# Patient Record
Sex: FEMALE | Race: WHITE | NOT HISPANIC OR LATINO | Employment: OTHER | URBAN - METROPOLITAN AREA
[De-identification: names, ages, dates, MRNs, and addresses within clinical notes are randomized per-mention and may not be internally consistent; named-entity substitution may affect disease eponyms.]

---

## 2017-01-03 RX ORDER — LEVOTHYROXINE SODIUM 0.1 MG/1
100 TABLET ORAL
Qty: 90 TAB | Refills: 3 | Status: SHIPPED | OUTPATIENT
Start: 2017-01-03 | End: 2017-01-10 | Stop reason: SDUPTHER

## 2017-01-10 ENCOUNTER — OFFICE VISIT (OUTPATIENT)
Dept: ENDOCRINOLOGY | Facility: MEDICAL CENTER | Age: 62
End: 2017-01-10
Payer: COMMERCIAL

## 2017-01-10 VITALS
WEIGHT: 127 LBS | BODY MASS INDEX: 22.5 KG/M2 | HEART RATE: 67 BPM | DIASTOLIC BLOOD PRESSURE: 72 MMHG | OXYGEN SATURATION: 98 % | HEIGHT: 63 IN | SYSTOLIC BLOOD PRESSURE: 118 MMHG

## 2017-01-10 DIAGNOSIS — E06.5 THYROIDITIS, CHRONIC: ICD-10-CM

## 2017-01-10 DIAGNOSIS — M85.80 OSTEOPENIA: ICD-10-CM

## 2017-01-10 DIAGNOSIS — E03.9 HYPOTHYROIDISM, UNSPECIFIED TYPE: ICD-10-CM

## 2017-01-10 PROCEDURE — 99214 OFFICE O/P EST MOD 30 MIN: CPT | Performed by: INTERNAL MEDICINE

## 2017-01-10 RX ORDER — LEVOTHYROXINE SODIUM 0.1 MG/1
100 TABLET ORAL
Qty: 90 TAB | Refills: 3 | Status: SHIPPED | OUTPATIENT
Start: 2017-01-10 | End: 2017-12-27 | Stop reason: SDUPTHER

## 2017-01-10 NOTE — MR AVS SNAPSHOT
"Lidia Fitzpatrick   1/10/2017 4:00 PM   Office Visit   MRN: [id number]    Department:  [facility]   Dept Phone:  [phone]    Description:  Female : 1955   Provider:  Daryl Lopez M.D.           Allergies as of 1/10/2017     Allergen Noted Reactions    Morphine 2016   Unspecified    Hallucinations  RXN=24 years ago      Vital Signs     Blood Pressure Pulse Height Weight Body Mass Index Oxygen Saturation    118/72 mmHg 67 1.6 m (5' 3\") 57.607 kg (127 lb) 22.50 kg/m2 98%    Smoking Status                   Never Smoker            Basic Information     Date Of Birth Sex Race Ethnicity Preferred Language    1955 Female White Non- English      Your appointments     2017  3:00 PM   [facility]K [facility]   [facility] [place] [facility]    [address]  [place] [address]   [phone]            2017  2:00 PM   FOLLOW UP with Andrés Perea MD,[facility] ([facility]    [address]  [place] [address] [phone]            Mar 03, 2017  1:30 PM   MA SCRN10 with SOCORRO VALDOVINOS MG 1   [facility] ([place])    [address]  [place] [id number] [phone]           No deodorant, powder, perfume or lotion under the arm or breast area.            Young 10, 2018  4:00 PM   Established Patient with Daryl Lopez M.D.   [facility] ([facility])    [address]  [place] [address] [phone]           You will be receiving a confirmation call a few days before your appointment from our automated call confirmation system.              Problem List              ICD-10-CM Priority Class Noted - Resolved    Hypothyroidism E03.9   2010 - Present    Osteopenia M85.80   Unknown - Present    Keratoconjunctivitis sicca (HCC) M35.01   Unknown - Present    Depression F32.9   " 5/20/2011 - Present    Visit for gynecologic examination Z01.419   1/27/2012 - Present    multiple thyroid nodules E07.9   Unknown - Present      Health Maintenance        Date Due Completion Dates    PAP SMEAR 6/30/1977 ---    COLONOSCOPY 6/30/2006 ---    IMM DTaP/Tdap/Td Vaccine (1 - Tdap) 8/13/2011 8/12/2011    IMM ZOSTER VACCINE 6/30/2016 ---    MAMMOGRAM 2/5/2017 2/5/2016, 1/30/2015, 1/24/2014, 1/15/2013, 1/13/2012            Current Immunizations     Influenza Vaccine Quad Inj (Pf) 10/13/2016  8:24 AM, 9/30/2015  8:02 AM, 9/24/2014    TD Vaccine 8/12/2011      Below and/or attached are the medications your provider expects you to take. Review all of your home medications and newly ordered medications with your provider and/or pharmacist. Follow medication instructions as directed by your provider and/or pharmacist. Please keep your medication list with you and share with your provider. Update the information when medications are discontinued, doses are changed, or new medications (including over-the-counter products) are added; and carry medication information at all times in the event of emergency situations     Allergies:  MORPHINE - Unspecified               Medications  Valid as of: January 10, 2017 -  4:38 PM    Generic Name Brand Name Tablet Size Instructions for use    B Complex Vitamins (Tab) b complex vitamins  Take 1 Tab by mouth every day.        Calcium Citrate-Vitamin D   Take 1 Tab by mouth every day.        Evening Primrose Oil (Cap) Evening Primrose Oil 1000 MG Take 1 Cap by mouth every day.        Flaxseed (Linseed) (Cap) Flax Seed Oil 1000 MG Take 1 Cap by mouth every day.        Ginkgo Biloba (Tab) Ginkgo Biloba 40 MG Take 1 Tab by mouth every day.        [place] (Cap) [place] Berry 550 MG Take 1 Cap by mouth every day.        Levothyroxine Sodium (Tab) SYNTHROID 100 MCG Take 1 Tab by mouth Every morning on an empty stomach.        Multiple Vitamin (Tab) THERAGRAN  Take 1 Tab by mouth  every day.        .                 Medicines prescribed today were sent to:     [facility] - [place], [place] - [address]    [address] [place] [address]    Phone: [phone] Fax: [phone]    Open 24 Hours?: No    [facility] - [place], [place] - [address]    [address] [place] [address]    Phone: [phone] Fax: [phone]    Open 24 Hours?: No      Medication refill instructions:       If your prescription bottle indicates you have medication refills left, it is not necessary to call your provider’s office. Please contact your pharmacy and they will refill your medication.    If your prescription bottle indicates you do not have any refills left, you may request refills at any time through one of the following ways: The online [employer] system (except Urgent Care), by calling your provider’s office, or by asking your pharmacy to contact your provider’s office with a refill request. Medication refills are processed only during regular business hours and may not be available until the next business day. Your provider may request additional information or to have a follow-up visit with you prior to refilling your medication.   *Please Note: Medication refills are assigned a new Rx number when refilled electronically. Your pharmacy may indicate that no refills were authorized even though a new prescription for the same medication is available at the pharmacy. Please request the medicine by name with the pharmacy before contacting your provider for a refill.           [employer] Access Code: Activation code not generated  Current [employer] Status: Active

## 2017-01-11 NOTE — PROGRESS NOTES
Chief Complaint   Patient presents with   • Hypothyroidism     chronic thyroiditis        HPI:         1. Hypothyroidism.    This diagnosis was first made by Dr. Fraire about eight years ago when he was with the medical school practice.  She has an ultrasound that appears to be a thyroiditis gland so I am assuming that is the basis for the hypothyroidism.  She has never had a goiter or any symptoms in the area of the thyroid.  She has been on Synthroid 100 mcg for a few years and it seems to suit her well.  Her TSH has bobbed up and down a little bit even though she is very regular about taking her thyroid.  For example a year ago in March, her TSH was 3.3 but in July the TSH was 0.7 at the same time the thyroxin level was mid range at 9.1.  Currently the TSH is back to 3.6 and her free T4 is still mid range at 1.0.  I am a little suspicious about the lability of the TSH so I am not inclined to change her dose.  Just given her size and her free T4 I don’t think it would be helpful to increase the dose.  Also we don’t want to over treat because it might accelerate bone loss.    2. Chronic thyroiditis.    At one point, she was followed for sub centimeter nodules.  Perhaps that was really related to the thyroiditis picture that we are seeing now because we no longer see nodules.  She has had no biopsy of any nodules.  There was never one that was large enough.  Her gland is asymptomatic.  It is not a large gland.  It is long and narrow which gives her the dimensions as indicated in the ultrasound report but on palpation is not a bulky gland and it is not tender.  Nothing more to do about it.  It is asymptomatic but she would like the gland followed because of the history of nodules.    3. Osteopenia.    T-scores lumbar spine -2.4 and at the hip -1.1.  She has a favorable FRAX calculation of 1.7%.  She is not taking medication and it is not indicated at this point.  She has very healthy lifestyle in terms of exercise,  calcium supplements and vitamin D.      She is about ten years menopausal.   She has never been symptomatic in terms of menopause symptoms.  She has not taken estrogen supplements.  I am not recommending them now at this stage and she is in agreement with that.      ROS:  Constitutional   feels generally healthy  Eyes   vision stable  ENT    no pain or epistaxis, no unusual congestion  Cardiac   no angina, no arrhythmia  Respiratory   no hemoptysis, no unusual dyspnea, no cough  GI    no pain, indigestion, or unexpected bowel change     no voiding symptoms  Musculoskeletal    no unusual or new pains  Skin   no suspicious or concerning lesions  Neuro  no unusual weakness or loss of function  Psych   appears alert and appropriate  Lymph   no new or unusual lumps or nodules    She does not have a primary care physician that she sees with any regularity. However I think she has her bases is covered.        She's had a recent hysterectomy oophorectomy for uterine prolapse. No pathology involved there.        She had a recent cardiovascular evaluation by Dr. Perea and the results are favorable.         Chemistries screened last year are favorable         Colonoscopy is planned for this coming November          She will continue to see Dr. Estrada because she has her cervix remaining and she will do breast follow-up with Dr. Harris as well.    Allergies:   Allergies   Allergen Reactions   • Morphine Unspecified     Hallucinations  RXN=24 years ago       Current medicines including changes today:  Current Outpatient Prescriptions   Medication Sig Dispense Refill   • levothyroxine (SYNTHROID) 100 MCG Tab Take 1 Tab by mouth Every morning on an empty stomach. 90 Tab 3   • multivitamin (THERAGRAN) Tab Take 1 Tab by mouth every day.     • Calcium Citrate-Vitamin D (CALCIUM + D PO) Take 1 Tab by mouth every day.     • Flaxseed, Linseed, (FLAX SEED OIL) 1000 MG Cap Take 1 Cap by mouth every day.     • [place] Vo 550 MG Cap  "Take 1 Cap by mouth every day.     • Ginkgo Biloba 40 MG Tab Take 1 Tab by mouth every day.     • Evening Primrose Oil 1000 MG Cap Take 1 Cap by mouth every day.     • b complex vitamins tablet Take 1 Tab by mouth every day.       No current facility-administered medications for this visit.        Past Medical History   Diagnosis Date   • multiple thyroid nodules      subcentimeter / 2016 heterogeneous okay   • Anxiety    • Osteopenia 2005   • Urinary tract infection, site not specified    • Keratoconjunctivitis sicca    • Hypothyroidism    • Gynecological disorder    • CATARACT      megan IOL   • Depression      mood swings   • Chronic thyroiditis       + US / neg ab       PHYSICAL EXAM:    /72 mmHg  Pulse 67  Ht 1.6 m (5' 3\")  Wt 57.607 kg (127 lb)  BMI 22.50 kg/m2  SpO2 98%     Gen.   appears healthy     Skin   appropriate for sex and age    HEENT  unremarkable    Neck   thyroid gland is palpable and about normal size. Nontender. No palpable nodules in the gland or elsewhere in the neck or supraclavicular areas.    Heart  regular    Extremities  no edema    Neuro  gait and station normal    Psych  Appropriate, calm, pleasant    ASSESSMENT AND RECOMMENDATIONS    1. Hypothyroidism, unspecified type               Clinically euthyroid on Synthroid 100 µg daily    2. Thyroiditis, chronic              Asymptomatic             Characteristic ultrasound with negative Tg ab and TPO ab     3.  Osteopenia             Favorable FRAX calculation            Basic preventative measures including regular exercise, calcium and vitamin D supplements      DISPOSITION:   January 2018 repeat ultrasound, thyroid blood tests and examination       Daryl Lopez M.D.    Copies to: Dr. Columba Perea    "

## 2017-01-12 ENCOUNTER — HOSPITAL ENCOUNTER (OUTPATIENT)
Dept: RADIOLOGY | Facility: MEDICAL CENTER | Age: 62
End: 2017-01-12
Attending: INTERNAL MEDICINE
Payer: COMMERCIAL

## 2017-01-12 DIAGNOSIS — E03.8 OTHER SPECIFIED HYPOTHYROIDISM: ICD-10-CM

## 2017-01-12 PROCEDURE — 93880 EXTRACRANIAL BILAT STUDY: CPT

## 2017-02-03 ENCOUNTER — OFFICE VISIT (OUTPATIENT)
Dept: CARDIOLOGY | Facility: MEDICAL CENTER | Age: 62
End: 2017-02-03
Payer: COMMERCIAL

## 2017-02-03 VITALS
SYSTOLIC BLOOD PRESSURE: 140 MMHG | OXYGEN SATURATION: 95 % | DIASTOLIC BLOOD PRESSURE: 80 MMHG | BODY MASS INDEX: 22.32 KG/M2 | HEART RATE: 59 BPM | WEIGHT: 126 LBS | HEIGHT: 63 IN

## 2017-02-03 DIAGNOSIS — E03.9 HYPOTHYROIDISM, UNSPECIFIED TYPE: ICD-10-CM

## 2017-02-03 DIAGNOSIS — I65.29 CAROTID ARTERY PLAQUE, UNSPECIFIED LATERALITY: ICD-10-CM

## 2017-02-03 PROCEDURE — 99213 OFFICE O/P EST LOW 20 MIN: CPT | Performed by: INTERNAL MEDICINE

## 2017-02-03 NOTE — MR AVS SNAPSHOT
"Lidia Hassanen   2/3/2017 2:15 PM   Office Visit   MRN: [id number]    Department:  [facility]   Dept Phone:  [phone]    Description:  Female : 1955   Provider:  Andrés Perea MD,[facility]           Reason for Visit     Follow-Up           Allergies as of 2/3/2017     Allergen Noted Reactions    Morphine 2016   Unspecified    Hallucinations  RXN=24 years ago      Vital Signs     Blood Pressure Pulse Height Weight Body Mass Index Oxygen Saturation    140/80 mmHg 59 1.6 m (5' 2.99\") 57.153 kg (126 lb) 22.33 kg/m2 95%    Smoking Status                   Never Smoker            Basic Information     Date Of Birth Sex Race Ethnicity Preferred Language    1955 Female White Non- English      Your appointments     Mar 03, 2017  1:30 PM   MA SCRN10 with S ARUNA MG 1   [facility] ([place])    [address]  [place] [address] [phone]           No deodorant, powder, perfume or lotion under the arm or breast area.            Young 10, 2018  4:00 PM   Established Patient with Daryl Lopez M.D.   [facility] ([facility])    [address]  [place] [address] [phone]           You will be receiving a confirmation call a few days before your appointment from our automated call confirmation system.              Problem List              ICD-10-CM Priority Class Noted - Resolved    Hypothyroidism E03.9   2010 - Present    Osteopenia M85.80   Unknown - Present    Keratoconjunctivitis sicca (CMS-[facility]) M35.01   Unknown - Present    Depression F32.9   2011 - Present    Visit for gynecologic examination Z01.419   2012 - Present    Thyroiditis, chronic E06.5   1/10/2017 - Present      Health Maintenance        Date Due Completion Dates    PAP SMEAR 1977 ---    COLONOSCOPY 2006 ---    IMM DTaP/Tdap/Td Vaccine (1 - Tdap) 2011    IMM ZOSTER " VACCINE 6/30/2016 ---    MAMMOGRAM 2/5/2017 2/5/2016, 1/30/2015, 1/24/2014, 1/15/2013, 1/13/2012            Current Immunizations     Influenza Vaccine Quad Inj (Pf) 10/13/2016  8:24 AM, 9/30/2015  8:02 AM, 9/24/2014    TD Vaccine 8/12/2011      Below and/or attached are the medications your provider expects you to take. Review all of your home medications and newly ordered medications with your provider and/or pharmacist. Follow medication instructions as directed by your provider and/or pharmacist. Please keep your medication list with you and share with your provider. Update the information when medications are discontinued, doses are changed, or new medications (including over-the-counter products) are added; and carry medication information at all times in the event of emergency situations     Allergies:  MORPHINE - Unspecified               Medications  Valid as of: February 03, 2017 -  2:45 PM    Generic Name Brand Name Tablet Size Instructions for use    B Complex Vitamins (Tab) b complex vitamins  Take 1 Tab by mouth every day.        Calcium Citrate-Vitamin D   Take 1 Tab by mouth every day.        Evening Primrose Oil (Cap) Evening Primrose Oil 1000 MG Take 1 Cap by mouth every day.        Flaxseed (Linseed) (Cap) Flax Seed Oil 1000 MG Take 1 Cap by mouth every day.        Ginkgo Biloba (Tab) Ginkgo Biloba 40 MG Take 1 Tab by mouth every day.        [place] (Cap) [place] Berry 550 MG Take 1 Cap by mouth every day.        Levothyroxine Sodium (Tab) SYNTHROID 100 MCG Take 1 Tab by mouth Every morning on an empty stomach.        Multiple Vitamin (Tab) THERAGRAN  Take 1 Tab by mouth every day.        .                 Medicines prescribed today were sent to:     [facility] - [place] [address]    [address] [facility] [place] [place] [address]    Phone: [phone] Fax: [phone]    Open 24 Hours?: No    [facility] - [place], [place] - [address]    [address]  [place] [address]    Phone: [phone] Fax: [phone]    Open 24 Hours?: No      Medication refill instructions:       If your prescription bottle indicates you have medication refills left, it is not necessary to call your provider’s office. Please contact your pharmacy and they will refill your medication.    If your prescription bottle indicates you do not have any refills left, you may request refills at any time through one of the following ways: The online [employer] system (except Urgent Care), by calling your provider’s office, or by asking your pharmacy to contact your provider’s office with a refill request. Medication refills are processed only during regular business hours and may not be available until the next business day. Your provider may request additional information or to have a follow-up visit with you prior to refilling your medication.   *Please Note: Medication refills are assigned a new Rx number when refilled electronically. Your pharmacy may indicate that no refills were authorized even though a new prescription for the same medication is available at the pharmacy. Please request the medicine by name with the pharmacy before contacting your provider for a refill.           [employer] Access Code: Activation code not generated  Current [employer] Status: Active

## 2017-02-03 NOTE — PROGRESS NOTES
Chief Complaint   Patient presents with   • Follow-Up       This patient is an established female who is here today to discuss:  recent father's cardiac procedure; She is concerned about her cardiac health; Follow up on carotid duplex, told had some ds in the past; CV risk 10 yr, 3.6%        Patient Active Problem List    Diagnosis Date Noted   • Thyroiditis, chronic 01/10/2017   • Visit for gynecologic examination 01/27/2012   • Depression 05/20/2011   • Osteopenia    • Keratoconjunctivitis sicca (CMS-HCC)    • Hypothyroidism 07/06/2010       Past Medical History   Diagnosis Date   • multiple thyroid nodules      subcentimeter / 2016 heterogeneous okay   • Anxiety    • Osteopenia 2005   • Urinary tract infection, site not specified    • Keratoconjunctivitis sicca    • Hypothyroidism    • Gynecological disorder    • CATARACT      megan IOL   • Depression      mood swings   • Chronic thyroiditis       + US / neg ab   • S/P hysterectomy with oophorectomy 2016     prolapse.     Past Surgical History   Procedure Laterality Date   • Bladder suspension     • Tubal coagulation laparoscopic bilateral     • Hysterectomy robotic  11/21/2016     Procedure: HYSTERECTOMY ROBOTIC SUPRA CERVICAL WITH BILATERAL SALPINGO-OOPHORECTOMY;  Surgeon: Columba Estrada M.D.;  Location: [facility];  Service:    • Colposacropexy robotic  11/21/2016     Procedure: COLPOSACROPEXY ROBOTIC;  Surgeon: Columba Estrada M.D.;  Location: [facility];  Service:    • Cystoscopy  11/21/2016     Procedure: CYSTOSCOPY;  Surgeon: Columba Estrada M.D.;  Location: [facility];  Service:    • Vaginal perineal exam repair  11/21/2016     Procedure: VAGINAL PERINEAL EXAM REPAIR FOR PERINEORRHAPHY, LEVATORPLASTY;  Surgeon: Columba Estrada M.D.;  Location: [facility];  Service:        Current Outpatient Prescriptions   Medication Sig Dispense Refill   • levothyroxine (SYNTHROID) 100 MCG Tab Take 1 Tab  "by mouth Every morning on an empty stomach. 90 Tab 3   • multivitamin (THERAGRAN) Tab Take 1 Tab by mouth every day.     • Calcium Citrate-Vitamin D (CALCIUM + D PO) Take 1 Tab by mouth every day.     • Flaxseed, Linseed, (FLAX SEED OIL) 1000 MG Cap Take 1 Cap by mouth every day.     • [place] Berry 550 MG Cap Take 1 Cap by mouth every day.     • Ginkgo Biloba 40 MG Tab Take 1 Tab by mouth every day.     • Evening Primrose Oil 1000 MG Cap Take 1 Cap by mouth every day.     • b complex vitamins tablet Take 1 Tab by mouth every day.       No current facility-administered medications for this visit.     Morphine      Review of Systems:     Constitutional: Denies fevers, Denies weight changes  Eyes: Denies changes in vision, no eye pain  Ears/Nose/Throat/Mouth: Denies nasal congestion or sore throat   Cardiovascular: Denies chest pain or palpitations   Respiratory: Denies shortness of breath , Denies cough  Gastrointestinal/Hepatic: Denies abdominal pain, nausea, vomiting, diarrhea, constipation or GI bleeding   Genitourinary: Denies bladder dysfunction, dysuria or frequency  Musculoskeletal/Rheum: Denies  joint pain and swelling   Skin/Breast: Denies rash, denies breast lumps or discharge  Neurological: Denies headache, confusion, memory loss or focal weakness/parasthesias  Psychiatric: denies mood disorder   Endocrine: denies hx of diabetes or thyroid dysfunction  Heme/Oncology/Lymph Nodes: Denies enlarged lymph nodes, denies brusing or known bleeding disorder  Allergic/Immunologic:  hx of allergies      All other systems were reviewed and are negative (AMA/CMS criteria)      Blood pressure 140/80, pulse 59, height 1.6 m (5' 2.99\"), weight 57.153 kg (126 lb), SpO2 95 %.  General Appearance:   Well developed, Well nourished, No acute distress, Non-toxic appearance.   HENT:  Normocephalic, Atraumatic, Oropharynx moist mucous membranes, Dentition: , Nose normal.    Eyes:  PERRLA, EOMI, Conjunctiva normal, No " discharge.  Neck:  Normal range of motion, No cervical tenderness, Supple, No stridor, no JVD .  No thyromegaly.  mild carotid bruit.  Cardiovascular:  Normal heart rate, Normal rhythm,  S1, S2, no S3,  S4; No gallops; No murmurs, No rubs, .   Extremitites with intact distal pulses, no cyanosis, clubbing or edema.  No heaves, thrills, HJR;  Peripheral pulses: carotid 2+, brachial 2+, radial 2+, ulnar 2+, femoral 2+, popliteal 2+, PT 2+, DP 2+;  Lungs:  Respiratory effort is normal. Normal breath sounds, breath sounds clear to auscultation bilaterally,  no rales, no rhonchi, no wheezing.   Abdomen: Bowel sounds normal, Soft, No tenderness, No guarding, No rebound, No masses, No hepatosplenomegaly.  Skin: Warm, Dry, No erythema, No rash, no induration or crepitus.  Neurologic: Alert & oriented x 3, Normal motor function, Normal sensory function, No focal deficits noted, cranial nerves II through XII are normal,  normal gait.  Psychiatric: Affect normal, Judgment normal, Mood normal.    Carotid duplex:   1.  There is no significant atherosclerotic plaque.    2.  There is no evidence of carotid occlusion.    3. Vertebral arteries demonstrate antegrade flow.    4. Diameter reduction in the internal carotid arteries: none. There is no hemodynamically significant stenosis.      Assessment and Plan.   61 y.o. female is reassured with no carotid stenosis, diet exercise discussed and her low 10 yr CV risk of 3.6%; No need for CCTA or calcium score at this time    1. Hypothyroidism, unspecified type  PCP    2. Carotid artery plaque, unspecified laterality  Reviewed and reassured      1. Hypothyroidism, unspecified type     2. Carotid artery plaque, unspecified laterality

## 2017-03-03 ENCOUNTER — HOSPITAL ENCOUNTER (OUTPATIENT)
Dept: RADIOLOGY | Facility: MEDICAL CENTER | Age: 62
End: 2017-03-03
Attending: OBSTETRICS & GYNECOLOGY
Payer: COMMERCIAL

## 2017-03-03 DIAGNOSIS — Z13.9 SCREENING: ICD-10-CM

## 2017-03-03 PROCEDURE — 77063 BREAST TOMOSYNTHESIS BI: CPT

## 2017-03-08 ENCOUNTER — HOSPITAL ENCOUNTER (OUTPATIENT)
Facility: MEDICAL CENTER | Age: 62
End: 2017-03-08
Payer: COMMERCIAL

## 2017-03-08 ENCOUNTER — TELEPHONE (OUTPATIENT)
Dept: MEDICAL GROUP | Age: 62
End: 2017-03-08

## 2017-03-08 LAB
ALBUMIN SERPL BCP-MCNC: 4.1 G/DL (ref 3.2–4.9)
ALBUMIN/GLOB SERPL: 1.1 G/DL
ALP SERPL-CCNC: 96 U/L (ref 30–99)
ALT SERPL-CCNC: 14 U/L (ref 2–50)
ANION GAP SERPL CALC-SCNC: 7 MMOL/L (ref 0–11.9)
AST SERPL-CCNC: 23 U/L (ref 12–45)
BDY FAT % MEASURED: 29.7 %
BILIRUB SERPL-MCNC: 0.6 MG/DL (ref 0.1–1.5)
BLOOD PRESSURE DIASTOLIC HTBPD: 84 MMHG
BLOOD PRESSURE SYSTOLIC HTBPS: 122 MMHG
BUN SERPL-MCNC: 21 MG/DL (ref 8–22)
CALCIUM SERPL-MCNC: 9.5 MG/DL (ref 8.5–10.5)
CHLORIDE SERPL-SCNC: 103 MMOL/L (ref 96–112)
CHOLEST SERPL-MCNC: 171 MG/DL (ref 100–199)
CO2 SERPL-SCNC: 28 MMOL/L (ref 20–33)
CREAT SERPL-MCNC: 0.9 MG/DL (ref 0.5–1.4)
DIABETES HTDIA: NO
EVENT NAME HTEVT: NORMAL
GLOBULIN SER CALC-MCNC: 3.6 G/DL (ref 1.9–3.5)
GLUCOSE SERPL-MCNC: 113 MG/DL (ref 65–99)
HDLC SERPL-MCNC: 54 MG/DL
HYPERTENSION HTHYP: NO
LDLC SERPL CALC-MCNC: 102 MG/DL
POTASSIUM SERPL-SCNC: 4.2 MMOL/L (ref 3.6–5.5)
PROT SERPL-MCNC: 7.7 G/DL (ref 6–8.2)
SCREENING LOC CITY HTCIT: NORMAL
SCREENING LOC STATE HTSTA: NORMAL
SCREENING LOCATION HTLOC: NORMAL
SODIUM SERPL-SCNC: 138 MMOL/L (ref 135–145)
SUBSCRIBER ID HTSID: NORMAL
TRIGL SERPL-MCNC: 76 MG/DL (ref 0–149)

## 2017-03-08 PROCEDURE — 80061 LIPID PANEL: CPT

## 2017-03-08 PROCEDURE — 80053 COMPREHEN METABOLIC PANEL: CPT

## 2017-03-08 PROCEDURE — S5190 WELLNESS ASSESSMENT BY NONPH: HCPCS

## 2017-03-09 NOTE — TELEPHONE ENCOUNTER
Called Lidia Fitzpatrick and left a message to return my call regarding his/her upcoming NP appointment with Deana Chao M.D..   If patient returns the call please transfer them to extension: [phone]

## 2017-03-17 ENCOUNTER — OFFICE VISIT (OUTPATIENT)
Dept: MEDICAL GROUP | Age: 62
End: 2017-03-17
Payer: COMMERCIAL

## 2017-03-17 VITALS
HEART RATE: 73 BPM | DIASTOLIC BLOOD PRESSURE: 70 MMHG | BODY MASS INDEX: 22.5 KG/M2 | SYSTOLIC BLOOD PRESSURE: 134 MMHG | OXYGEN SATURATION: 97 % | HEIGHT: 63 IN | TEMPERATURE: 99.7 F | WEIGHT: 127 LBS

## 2017-03-17 DIAGNOSIS — E03.9 HYPOTHYROIDISM, UNSPECIFIED TYPE: ICD-10-CM

## 2017-03-17 DIAGNOSIS — R73.03 PREDIABETES: ICD-10-CM

## 2017-03-17 DIAGNOSIS — M85.80 OSTEOPENIA: ICD-10-CM

## 2017-03-17 DIAGNOSIS — Z90.722 S/P TAH-BSO: ICD-10-CM

## 2017-03-17 DIAGNOSIS — E06.5 THYROIDITIS, CHRONIC: ICD-10-CM

## 2017-03-17 DIAGNOSIS — Z00.00 PE (PHYSICAL EXAM), ANNUAL: Primary | ICD-10-CM

## 2017-03-17 DIAGNOSIS — Z90.79 S/P TAH-BSO: ICD-10-CM

## 2017-03-17 DIAGNOSIS — Z90.710 S/P TAH-BSO: ICD-10-CM

## 2017-03-17 PROCEDURE — 99396 PREV VISIT EST AGE 40-64: CPT | Performed by: FAMILY MEDICINE

## 2017-03-17 ASSESSMENT — PATIENT HEALTH QUESTIONNAIRE - PHQ9: CLINICAL INTERPRETATION OF PHQ2 SCORE: 0

## 2017-03-17 NOTE — PROGRESS NOTES
CC: [facility], wellness visit    HPI:     Lidia Fitzpatrick is a 61 y.o. female, new patient to the clinic, presents to [facility]. Pt has the following concerns:     1. Hypothyroidism, unspecified type  Chronic, currently taking Levothyroxine, managed by Endo.   Denies chest palpitation, constipation, diarrhea, abnormal vaginal bleeding, unusual edema, hair loss.    2. Osteopenia  Hx of osteopenia per DEXA scan done in 2013. Pt's mother has hx of osteoporosis, hence she had DEXA scan done early.   She has been taking calcium, vitamin D, and does weight bearing exercise almost every day.     3. Prediabetes  Hx of prediabetes, last A1C 5.8, pt is monitoring her diet, she exercises daily, BMI 22.5     4. S/P MITCH-BSO  Pt had hx of uterine prolapse, s/p MITCH-BSO. The cervix was left behind for unclear reasons.   Pt reports copious clear  vaginal discharge. She is concerned that she might have fistula.   She has appointment to follow up with Dr. Estrada in couple months.     Current medicines (including changes today)  Current Outpatient Prescriptions   Medication Sig Dispense Refill   • levothyroxine (SYNTHROID) 100 MCG Tab Take 1 Tab by mouth Every morning on an empty stomach. 90 Tab 3   • multivitamin (THERAGRAN) Tab Take 1 Tab by mouth every day.     • Calcium Citrate-Vitamin D (CALCIUM + D PO) Take 1 Tab by mouth every day.     • Flaxseed, Linseed, (FLAX SEED OIL) 1000 MG Cap Take 1 Cap by mouth every day.     • Herrera Berry 550 MG Cap Take 1 Cap by mouth every day.     • Ginkgo Biloba 40 MG Tab Take 1 Tab by mouth every day.     • Evening Primrose Oil 1000 MG Cap Take 1 Cap by mouth every day.     • b complex vitamins tablet Take 1 Tab by mouth every day.       No current facility-administered medications for this visit.     She  has a past medical history of multiple thyroid nodules; Anxiety; Osteopenia (2005); Urinary tract infection, site not specified; Keratoconjunctivitis sicca; Hypothyroidism;  Gynecological disorder; CATARACT; Depression; Chronic thyroiditis; and S/P hysterectomy with oophorectomy (2016). She also has no past medical history of Addisons disease (CMS-HCC), Adrenal disorder, Cushings syndrome, Parathyroid disorder (CMS-HCC), Pituitary disease (CMS-HCC), Allergy, Anemia, Blood transfusion, Arrhythmia, Heart murmur, Heart attack (CMS-HCC), CHF (congestive heart failure) (CMS-HCC), Clotting disorder, ASTHMA, COPD, Diabetes, EMPHYSEMA, Arthritis, Glaucoma, Goiter, GERD (gastroesophageal reflux disease), Headache(784.0), HIV (human immunodeficiency virus infection), Hyperlipidemia, Hypertension, IBD (inflammatory bowel disease), Kidney disease, Meningitis, Migraine, Muscle disorder, Seizure (CMS-HCC), Stroke (CMS-HCC), Substance abuse, Tuberculosis, or Ulcer (CMS-HCC).  She  has past surgical history that includes bladder suspension; tubal coagulation laparoscopic bilateral; hysterectomy robotic (2016); colposacropexy robotic (2016); cystoscopy (2016); and vaginal perineal exam repair (2016).  Social History   Substance Use Topics   • Smoking status: Never Smoker    • Smokeless tobacco: Never Used   • Alcohol Use: No     Social History     Social History Narrative     Family History   Problem Relation Age of Onset   • Hyperlipidemia Mother    • Hyperlipidemia Father    • Cancer Maternal Aunt 62     Breast   • Cancer Paternal Uncle      digestive, unknown type   • Stroke Maternal Grandmother 87   • Cancer Paternal Grandfather      Stomach     Family Status   Relation Status Death Age   • Mother Alive    • Father Alive    • Maternal Grandmother     • Maternal Grandfather     • Paternal Grandmother     • Paternal Grandfather         I personally reviewed patient's problem list, allergies, medications, family hx, social hx with patient and update [facility].     REVIEW OF SYSTEMS:  CONSTITUTIONAL:  Denies night sweats, fatigue, malaise, lethargy,  "fever or chills.  RESPIRATORY:  Denies cough, wheeze, hemoptysis, or shortness of breath.  CARDIOVASCULAR:  Denies chest pains, palpitations, pedal edema  GASTROINTESTINAL:  Denies abdominal pain, nausea or vomiting, diarrhea, constipation, hematemesis, hematochezia, melena.  GENITOURINARY:  Denies urinary urgency, frequency, dysuria, or hematuria.  No obstructive symptoms.  Denies unusual discharge.      All other systems reviewed and are negative     Objective:     Blood pressure 134/70, pulse 73, temperature 37.6 °C (99.7 °F), height 1.6 m (5' 2.99\"), weight 57.607 kg (127 lb), SpO2 97 %. Body mass index is 22.5 kg/(m^2).  Physical Exam:    Constitutional: Awake, alert, in no apparent distress.  Skin: Warm, dry, good turgor, no rashes/jaundice in visible areas.  Eye: intact EOM, conjunctiva clear, lids normal.  Neck: Trachea midline, no masses, no thyromegaly. No cervical or supraclavicular lymphadenopathy.  Respiratory: Unlabored respiratory effort, lungs clear to auscultation, no wheezes, no rhonchi.  Cardiovascular: Normal S1, S2, no murmur, no rubs, no gallops, no pedal edema.  Abdomen: Soft, active bowel sounds, non-tender to palpation, no masses, no hepatosplenomegaly.  Neuro: CN 2-12 grossly intact, no focal weakness/numbness.   Psych: Alert and oriented x3, affect and mood wnl, intact judgement and insight.       Assessment and Plan:   The following treatment plan was discussed    1. Hypothyroidism, unspecified type  Chronic, well controlled with Levothyroxine 100 mcg qd, currently being managed by ENDO. Last thyroid function test was normal in 12/2016. Plan:   - continue Levothyroxine  - f/u with Endo    2. Osteopenia  Advanced osteopenia per DEXA done in 2013. Pt was on medication for 3 years but recent stopped by her previous ENDO.   She is taking vitamin, calcium and does weight-bearing exercise daily. Plan:   - DS-BONE DENSITY STUDY (DEXA); Future  - continue calcium, vitamin D, and weight bearing " exercise.     3. Prediabetes  Discussed dietary modification, daily exercise. BMI 22.5. Plan:   - will monitor lab annually.     4. S/P MITCH-BSO  Pt had hx of uterine prolapse, s/p MITCH-BSO. The cervix was left behind for unclear reasons.   Pt reports copious clear  vaginal discharge. She is concerned that she might have fistula.   She has appointment to follow up with Dr. Estrada in couple months. Plan:   - f/u with Dr. Estrada.     HM:   Previous colonoscopy done 5 years ago by [facility], she is due for f/u colonoscopy in 11/2017. Will request records.       Records requested.  Followup: Return in about 1 year (around 3/17/2018) for Annual wellness visit.

## 2017-04-28 ENCOUNTER — HOSPITAL ENCOUNTER (OUTPATIENT)
Dept: RADIOLOGY | Facility: MEDICAL CENTER | Age: 62
End: 2017-04-28
Attending: OBSTETRICS & GYNECOLOGY
Payer: COMMERCIAL

## 2017-04-28 DIAGNOSIS — N82.0 VESICOVAGINAL FISTULA: ICD-10-CM

## 2017-04-28 PROCEDURE — 51600 INJECTION FOR BLADDER X-RAY: CPT

## 2017-04-28 PROCEDURE — 700117 HCHG RX CONTRAST REV CODE 255: Performed by: OBSTETRICS & GYNECOLOGY

## 2017-04-28 RX ADMIN — IOHEXOL 200 ML: 240 INJECTION, SOLUTION INTRATHECAL; INTRAVASCULAR; INTRAVENOUS; ORAL at 16:01

## 2017-04-28 RX ADMIN — IOHEXOL 100 ML: 240 INJECTION, SOLUTION INTRATHECAL; INTRAVASCULAR; INTRAVENOUS; ORAL at 16:02

## 2017-05-12 ENCOUNTER — HOSPITAL ENCOUNTER (OUTPATIENT)
Dept: RADIOLOGY | Facility: MEDICAL CENTER | Age: 62
End: 2017-05-12
Attending: FAMILY MEDICINE
Payer: COMMERCIAL

## 2017-05-12 DIAGNOSIS — M85.80 OSTEOPENIA: ICD-10-CM

## 2017-05-12 PROCEDURE — 77080 DXA BONE DENSITY AXIAL: CPT

## 2017-06-25 ENCOUNTER — HOSPITAL ENCOUNTER (OUTPATIENT)
Facility: MEDICAL CENTER | Age: 62
End: 2017-06-25
Attending: PHYSICIAN ASSISTANT
Payer: COMMERCIAL

## 2017-06-25 ENCOUNTER — OFFICE VISIT (OUTPATIENT)
Dept: URGENT CARE | Facility: CLINIC | Age: 62
End: 2017-06-25
Payer: COMMERCIAL

## 2017-06-25 VITALS
DIASTOLIC BLOOD PRESSURE: 80 MMHG | WEIGHT: 124 LBS | RESPIRATION RATE: 20 BRPM | TEMPERATURE: 97.8 F | SYSTOLIC BLOOD PRESSURE: 120 MMHG | OXYGEN SATURATION: 98 % | HEART RATE: 78 BPM | BODY MASS INDEX: 21.97 KG/M2

## 2017-06-25 DIAGNOSIS — N30.00 ACUTE CYSTITIS WITHOUT HEMATURIA: ICD-10-CM

## 2017-06-25 LAB
APPEARANCE UR: ABNORMAL
BILIRUB UR STRIP-MCNC: ABNORMAL MG/DL
COLOR UR AUTO: ABNORMAL
GLUCOSE UR STRIP.AUTO-MCNC: ABNORMAL MG/DL
KETONES UR STRIP.AUTO-MCNC: ABNORMAL MG/DL
LEUKOCYTE ESTERASE UR QL STRIP.AUTO: ABNORMAL
NITRITE UR QL STRIP.AUTO: ABNORMAL
PH UR STRIP.AUTO: 8.5 [PH] (ref 5–8)
PROT UR QL STRIP: ABNORMAL MG/DL
RBC UR QL AUTO: ABNORMAL
SP GR UR STRIP.AUTO: 1
UROBILINOGEN UR STRIP-MCNC: ABNORMAL MG/DL

## 2017-06-25 PROCEDURE — 99214 OFFICE O/P EST MOD 30 MIN: CPT | Performed by: PHYSICIAN ASSISTANT

## 2017-06-25 PROCEDURE — 87077 CULTURE AEROBIC IDENTIFY: CPT

## 2017-06-25 PROCEDURE — 87086 URINE CULTURE/COLONY COUNT: CPT

## 2017-06-25 PROCEDURE — 81002 URINALYSIS NONAUTO W/O SCOPE: CPT | Performed by: PHYSICIAN ASSISTANT

## 2017-06-25 PROCEDURE — 99000 SPECIMEN HANDLING OFFICE-LAB: CPT | Performed by: PHYSICIAN ASSISTANT

## 2017-06-25 PROCEDURE — 87186 SC STD MICRODIL/AGAR DIL: CPT

## 2017-06-25 RX ORDER — NITROFURANTOIN 25; 75 MG/1; MG/1
100 CAPSULE ORAL 2 TIMES DAILY
Qty: 14 CAP | Refills: 0 | Status: SHIPPED | OUTPATIENT
Start: 2017-06-25 | End: 2017-07-02

## 2017-06-25 ASSESSMENT — ENCOUNTER SYMPTOMS
PND: 0
SENSORY CHANGE: 0
COUGH: 0
ABDOMINAL PAIN: 0
NAUSEA: 0
FOCAL WEAKNESS: 0
FLANK PAIN: 0
SHORTNESS OF BREATH: 0
FEVER: 0
DIARRHEA: 0
HEADACHES: 0
VOMITING: 0
TINGLING: 0
BACK PAIN: 0
CHILLS: 0
DIZZINESS: 0

## 2017-06-25 NOTE — PROGRESS NOTES
Subjective:      Lidia Fitzpatrick is a 61 y.o. female who presents with Dysuria            Dysuria   This is a new problem. The current episode started yesterday. The problem occurs every urination. The problem has been unchanged. The quality of the pain is described as burning. There has been no fever. She is not sexually active. There is no history of pyelonephritis. Associated symptoms include frequency and urgency. Pertinent negatives include no chills, discharge, flank pain, hematuria, nausea or vomiting. Treatments tried: AZO. There is no history of kidney stones or recurrent UTIs.     Past Medical History   Diagnosis Date   • multiple thyroid nodules      subcentimeter / 2016 heterogeneous okay   • Anxiety    • Osteopenia 2005   • Urinary tract infection, site not specified    • Keratoconjunctivitis sicca    • Hypothyroidism    • Gynecological disorder    • CATARACT      megan IOL   • Depression      mood swings   • Chronic thyroiditis       + US / neg ab   • S/P hysterectomy with oophorectomy 2016     prolapse.     .    Review of Systems   Constitutional: Negative for fever, chills and malaise/fatigue.   Respiratory: Negative for cough and shortness of breath.    Cardiovascular: Negative for chest pain and PND.   Gastrointestinal: Negative for nausea, vomiting, abdominal pain and diarrhea.   Genitourinary: Positive for dysuria, urgency and frequency. Negative for hematuria and flank pain.   Musculoskeletal: Negative for back pain.   Neurological: Negative for dizziness, tingling, sensory change, focal weakness and headaches.     All other systems reviewed and are negative.        Objective:     /80 mmHg  Pulse 78  Temp(Src) 36.6 °C (97.8 °F)  Resp 20  Wt 56.246 kg (124 lb)  SpO2 98%     Physical Exam   Constitutional: She is oriented to person, place, and time. She appears well-developed and well-nourished. No distress.   HENT:   Head: Normocephalic and atraumatic.   Eyes: Conjunctivae are normal.    Cardiovascular: Normal rate, regular rhythm and normal heart sounds.  Exam reveals no gallop and no friction rub.    No murmur heard.  Pulmonary/Chest: Effort normal and breath sounds normal. No respiratory distress. She has no wheezes. She has no rales.   Abdominal: Soft. Bowel sounds are normal. She exhibits no distension and no mass. There is no tenderness. There is no rigidity, no rebound, no guarding and no CVA tenderness. No hernia.   Neurological: She is alert and oriented to person, place, and time. No cranial nerve deficit.   Psychiatric: She has a normal mood and affect. Her behavior is normal. Judgment and thought content normal.           Lab Results   Component Value Date/Time    POC COLOR ORANGE 06/25/2017 01:18 PM    POC APPEARANCE CLOUDY 06/25/2017 01:18 PM    POC LEUKOCYTE ESTERASE MOD 06/25/2017 01:18 PM    POC NITRITES ? 06/25/2017 01:18 PM    POC UROBILIGEN ? 06/25/2017 01:18 PM    POC PROTEIN ? 06/25/2017 01:18 PM    POC URINE PH 8.5* 06/25/2017 01:18 PM    POC BLOOD LARGE 06/25/2017 01:18 PM    POC SPECIFIC GRAVITY 1.000 06/25/2017 01:18 PM    POC KETONES ? 06/25/2017 01:18 PM    POC BILIRUBEN ? 06/25/2017 01:18 PM    POC GLUCOSE neg 06/25/2017 01:18 PM           Assessment/Plan:     1. Acute cystitis without hematuria  POCT Urinalysis    URINE CULTURE(NEW)    nitrofurantoin monohydr macro (MACROBID) 100 MG Cap       •  nitrofurantoin monohydr macro (MACROBID) 100 MG Cap, Take 1 Cap by mouth 2 times a day for 7 days., Disp: 14 Cap, Rfl: 0    - push fluids.     Differential diagnoses, Supportive care, and indications for immediate follow-up discussed with patient.   Instructed to return to clinic or nearest emergency department for any change in condition, further concerns, or worsening of symptoms.    The patient demonstrated a good understanding and agreed with the treatment plan.    Bekah Hill PA-C

## 2017-06-25 NOTE — MR AVS SNAPSHOT
Lidia Fitzpatrick   2017 1:00 PM   Office Visit   MRN: [id number]    Department:  [facility]   Dept Phone:  [phone]    Description:  Female : 1955   Provider:  Bekah Hill PA-C           Reason for Visit     Dysuria Since yesterday urinary pain .      Allergies as of 2017     Allergen Noted Reactions    Morphine 2016   Unspecified    Hallucinations  RXN=24 years ago      You were diagnosed with     Acute cystitis without hematuria   [000375]         Vital Signs     Blood Pressure Pulse Temperature Respirations Weight Oxygen Saturation    120/80 mmHg 78 36.6 °C (97.8 °F) 20 56.246 kg (124 lb) 98%    Smoking Status                   Never Smoker            Basic Information     Date Of Birth Sex Race Ethnicity Preferred Language    1955 Female White Non- English      Your appointments     Young 10, 2018  4:00 PM   Established Patient with Daryl Lopez M.D.   [facility]    [address], [address]  [place] [address] [phone]           You will be receiving a confirmation call a few days before your appointment from our automated call confirmation system.              Problem List              ICD-10-CM Priority Class Noted - Resolved    Hypothyroidism E03.9   2010 - Present    Osteopenia M85.80   Unknown - Present    Thyroiditis, chronic E06.5   1/10/2017 - Present    Prediabetes R73.03   3/17/2017 - Present    S/P MITCH-BSO Z90.710, Z90.722, Z90.79   3/17/2017 - Present      Health Maintenance        Date Due Completion Dates    PAP SMEAR 1977 ---    COLONOSCOPY 2006 ---    IMM DTaP/Tdap/Td Vaccine (1 - Tdap) 2011    IMM ZOSTER VACCINE 2016 ---    MAMMOGRAM 3/3/2018 3/3/2017, 2016, 2015, 2014, 1/15/2013, 2012            Results     POCT Urinalysis      Component Value Standard Range & Units    POC Color ORANGE Negative    Pt on Azo standard    POC  Appearance CLOUDY Negative    POC Leukocyte Esterase MOD Negative    POC Nitrites ? Negative    POC Urobiligen ? Negative (0.2) mg/dL    POC Protein ? Negative mg/dL    POC Urine PH 8.5 5.0 - 8.0    POC Blood LARGE Negative    POC Specific Gravity 1.000 <1.005 - >1.030    POC Ketones ? Negative mg/dL    POC Biliruben ? Negative mg/dL    POC Glucose neg Negative mg/dL                        Current Immunizations     Influenza Vaccine Quad Inj (Pf) 10/13/2016  8:24 AM, 9/30/2015  8:02 AM, 9/24/2014    TD Vaccine 8/12/2011      Below and/or attached are the medications your provider expects you to take. Review all of your home medications and newly ordered medications with your provider and/or pharmacist. Follow medication instructions as directed by your provider and/or pharmacist. Please keep your medication list with you and share with your provider. Update the information when medications are discontinued, doses are changed, or new medications (including over-the-counter products) are added; and carry medication information at all times in the event of emergency situations     Allergies:  MORPHINE - Unspecified               Medications  Valid as of: June 25, 2017 -  1:36 PM    Generic Name Brand Name Tablet Size Instructions for use    B Complex Vitamins (Tab) b complex vitamins  Take 1 Tab by mouth every day.        Calcium Citrate-Vitamin D   Take 1 Tab by mouth every day.        Evening Primrose Oil (Cap) Evening Primrose Oil 1000 MG Take 1 Cap by mouth every day.        Flaxseed (Linseed) (Cap) Flax Seed Oil 1000 MG Take 1 Cap by mouth every day.        Ginkgo Biloba (Tab) Ginkgo Biloba 40 MG Take 1 Tab by mouth every day.        [place] (Cap) Herrera Berry 550 MG Take 1 Cap by mouth every day.        Levothyroxine Sodium (Tab) SYNTHROID 100 MCG Take 1 Tab by mouth Every morning on an empty stomach.        Multiple Vitamin (Tab) THERAGRAN  Take 1 Tab by mouth every day.        Nitrofurantoin Monohyd Macro  (Cap) MACROBID 100 MG Take 1 Cap by mouth 2 times a day for 7 days.        Phenazopyridine HCl (Tab) Phenazopyridine HCl 97.5 MG Take  by mouth.        .                 Medicines prescribed today were sent to:     [id number] - [place], [place] - [address]    [address] [place] [address]    Phone: [phone] Fax: [phone]    Open 24 Hours?: No    [facility] - [place], [place] - [address]    [address] [place] [address]    Phone: [phone] Fax: [phone]    Open 24 Hours?: No      Medication refill instructions:       If your prescription bottle indicates you have medication refills left, it is not necessary to call your provider’s office. Please contact your pharmacy and they will refill your medication.    If your prescription bottle indicates you do not have any refills left, you may request refills at any time through one of the following ways: The online Trustev system (except Urgent Care), by calling your provider’s office, or by asking your pharmacy to contact your provider’s office with a refill request. Medication refills are processed only during regular business hours and may not be available until the next business day. Your provider may request additional information or to have a follow-up visit with you prior to refilling your medication.   *Please Note: Medication refills are assigned a new Rx number when refilled electronically. Your pharmacy may indicate that no refills were authorized even though a new prescription for the same medication is available at the pharmacy. Please request the medicine by name with the pharmacy before contacting your provider for a refill.        Your To Do List     Future Labs/Procedures Complete By Expires    URINE CULTURE(NEW)  As directed 6/25/2018         Trustev Access Code: Activation code not generated  Current Trustev Status: Active

## 2017-06-26 DIAGNOSIS — N30.00 ACUTE CYSTITIS WITHOUT HEMATURIA: ICD-10-CM

## 2017-06-28 LAB
BACTERIA UR CULT: ABNORMAL
BACTERIA UR CULT: ABNORMAL
SIGNIFICANT IND 70042: ABNORMAL
SOURCE SOURCE: ABNORMAL

## 2017-07-22 ENCOUNTER — HOSPITAL ENCOUNTER (OUTPATIENT)
Dept: RADIOLOGY | Facility: MEDICAL CENTER | Age: 62
End: 2017-07-22
Attending: PHYSICIAN ASSISTANT
Payer: COMMERCIAL

## 2017-07-22 DIAGNOSIS — R32 UNSPECIFIED URINARY INCONTINENCE: ICD-10-CM

## 2017-07-22 PROCEDURE — 76775 US EXAM ABDO BACK WALL LIM: CPT

## 2017-10-04 ENCOUNTER — APPOINTMENT (OUTPATIENT)
Dept: SOCIAL WORK | Facility: CLINIC | Age: 62
End: 2017-10-04
Payer: COMMERCIAL

## 2017-10-04 PROCEDURE — 90471 IMMUNIZATION ADMIN: CPT | Performed by: REGISTERED NURSE

## 2017-10-04 PROCEDURE — 90686 IIV4 VACC NO PRSV 0.5 ML IM: CPT | Performed by: REGISTERED NURSE

## 2017-11-27 ENCOUNTER — APPOINTMENT (OUTPATIENT)
Dept: ADMISSIONS | Facility: MEDICAL CENTER | Age: 62
End: 2017-11-27
Attending: SURGERY
Payer: COMMERCIAL

## 2017-11-29 ENCOUNTER — HOSPITAL ENCOUNTER (OUTPATIENT)
Facility: MEDICAL CENTER | Age: 62
End: 2017-11-29
Attending: SURGERY | Admitting: SURGERY
Payer: COMMERCIAL

## 2017-11-29 VITALS
TEMPERATURE: 97.2 F | OXYGEN SATURATION: 100 % | HEIGHT: 63 IN | BODY MASS INDEX: 21.21 KG/M2 | WEIGHT: 119.71 LBS | HEART RATE: 61 BPM | RESPIRATION RATE: 18 BRPM

## 2017-11-29 PROCEDURE — 700101 HCHG RX REV CODE 250

## 2017-11-29 PROCEDURE — 501838 HCHG SUTURE GENERAL: Performed by: SURGERY

## 2017-11-29 PROCEDURE — 160048 HCHG OR STATISTICAL LEVEL 1-5: Performed by: SURGERY

## 2017-11-29 PROCEDURE — 500881 HCHG PACK, EXTREMITY: Performed by: SURGERY

## 2017-11-29 PROCEDURE — 160002 HCHG RECOVERY MINUTES (STAT): Performed by: SURGERY

## 2017-11-29 PROCEDURE — 160029 HCHG SURGERY MINUTES - 1ST 30 MINS LEVEL 4: Performed by: SURGERY

## 2017-11-29 PROCEDURE — 160046 HCHG PACU - 1ST 60 MINS PHASE II: Performed by: SURGERY

## 2017-11-29 PROCEDURE — 160009 HCHG ANES TIME/MIN: Performed by: SURGERY

## 2017-11-29 PROCEDURE — 700111 HCHG RX REV CODE 636 W/ 250 OVERRIDE (IP)

## 2017-11-29 PROCEDURE — 160025 RECOVERY II MINUTES (STATS): Performed by: SURGERY

## 2017-11-29 PROCEDURE — 160035 HCHG PACU - 1ST 60 MINS PHASE I: Performed by: SURGERY

## 2017-11-29 RX ORDER — BUPIVACAINE HYDROCHLORIDE AND EPINEPHRINE 5; 5 MG/ML; UG/ML
INJECTION, SOLUTION EPIDURAL; INTRACAUDAL; PERINEURAL
Status: DISCONTINUED | OUTPATIENT
Start: 2017-11-29 | End: 2017-11-29 | Stop reason: HOSPADM

## 2017-11-29 RX ORDER — HYDROCODONE BITARTRATE AND ACETAMINOPHEN 5; 325 MG/1; MG/1
1-2 TABLET ORAL EVERY 4 HOURS PRN
Qty: 20 TAB | Refills: 0 | Status: SHIPPED | OUTPATIENT
Start: 2017-11-29 | End: 2018-03-09

## 2017-11-29 RX ORDER — LIDOCAINE HYDROCHLORIDE 10 MG/ML
INJECTION, SOLUTION INFILTRATION; PERINEURAL
Status: COMPLETED
Start: 2017-11-29 | End: 2017-11-29

## 2017-11-29 RX ORDER — LIDOCAINE HYDROCHLORIDE 10 MG/ML
0.5 INJECTION, SOLUTION INFILTRATION; PERINEURAL
Status: DISCONTINUED | OUTPATIENT
Start: 2017-11-29 | End: 2017-11-29 | Stop reason: HOSPADM

## 2017-11-29 RX ORDER — SODIUM CHLORIDE, SODIUM LACTATE, POTASSIUM CHLORIDE, CALCIUM CHLORIDE 600; 310; 30; 20 MG/100ML; MG/100ML; MG/100ML; MG/100ML
INJECTION, SOLUTION INTRAVENOUS CONTINUOUS
Status: DISCONTINUED | OUTPATIENT
Start: 2017-11-29 | End: 2017-11-29 | Stop reason: HOSPADM

## 2017-11-29 RX ORDER — LIDOCAINE AND PRILOCAINE 25; 25 MG/G; MG/G
1 CREAM TOPICAL
Status: DISCONTINUED | OUTPATIENT
Start: 2017-11-29 | End: 2017-11-29 | Stop reason: HOSPADM

## 2017-11-29 RX ADMIN — SODIUM CHLORIDE, SODIUM LACTATE, POTASSIUM CHLORIDE, CALCIUM CHLORIDE: 600; 310; 30; 20 INJECTION, SOLUTION INTRAVENOUS at 07:30

## 2017-11-29 RX ADMIN — LIDOCAINE HYDROCHLORIDE 0.5 ML: 10 INJECTION, SOLUTION INFILTRATION; PERINEURAL at 07:30

## 2017-11-29 ASSESSMENT — PAIN SCALES - GENERAL
PAINLEVEL_OUTOF10: 2
PAINLEVEL_OUTOF10: 0
PAINLEVEL_OUTOF10: 2
PAINLEVEL_OUTOF10: 0

## 2017-11-29 NOTE — DISCHARGE INSTRUCTIONS
ACTIVITY: Rest and take it easy for the first 24 hours.  A responsible adult is recommended to remain with you during that time.  It is normal to feel sleepy.  We encourage you to not do anything that requires balance, judgment or coordination.    MILD FLU-LIKE SYMPTOMS ARE NORMAL. YOU MAY EXPERIENCE GENERALIZED MUSCLE ACHES, THROAT IRRITATION, HEADACHE AND/OR SOME NAUSEA.    FOR 24 HOURS DO NOT:  Drive, operate machinery or run household appliances.  Drink beer or alcoholic beverages.   Make important decisions or sign legal documents.    SPECIAL INSTRUCTIONS:  Okay to remove bandage in 4 days and get incision wet and cover with bandaids as needed.  Elevate above heart and ice frequently for at least 2 weeks. Encourage sedentary use of hand and frequent moving of fingers to prevent stiffness.   No heavy lifting or grasping for at least 4 weeks.   No driving while on narcotic pain medications.   Call MD or come to ER for any major concerns.    DIET: To avoid nausea, slowly advance diet as tolerated, avoiding spicy or greasy foods for the first day.  Add more substantial food to your diet according to your physician's instructions.  Babies can be fed formula or breast milk as soon as they are hungry.  INCREASE FLUIDS AND FIBER TO AVOID CONSTIPATION.    SURGICAL DRESSING/BATHING: *See above*    FOLLOW-UP APPOINTMENT:  A follow-up appointment should be arranged with your doctor; call to schedule.    You should CALL YOUR PHYSICIAN if you develop:  Fever greater than 101 degrees F.  Pain not relieved by medication, or persistent nausea or vomiting.  Excessive bleeding (blood soaking through dressing) or unexpected drainage from the wound.  Extreme redness or swelling around the incision site, drainage of pus or foul smelling drainage.  Inability to urinate or empty your bladder within 8 hours.  Problems with breathing or chest pain.    You should call 911 if you develop problems with breathing or chest pain.  If you  are unable to contact your doctor or surgical center, you should go to the nearest emergency room or urgent care center.  Physician's telephone #: *Dr. Ennis [phone]*    If any questions arise, call your doctor.  If your doctor is not available, please feel free to call the Surgical Center at [phone].  The Center is open Monday through Friday from 7AM to 7PM.  You can also call the HEALTH HOTLINE open 24 hours/day, 7 days/week and speak to a nurse at [phone], or toll free at [phone].    A registered nurse may call you a few days after your surgery to see how you are doing after your procedure.    MEDICATIONS: Resume taking daily medication.  Take prescribed pain medication with food.  If no medication is prescribed, you may take non-aspirin pain medication if needed.  PAIN MEDICATION CAN BE VERY CONSTIPATING.  Take a stool softener or laxative such as senokot, pericolace, or milk of magnesia if needed.    Prescription for Norco.      If your physician has prescribed pain medication that includes Acetaminophen (Tylenol), do not take additional Acetaminophen (Tylenol) while taking the prescribed medication.    Depression / Suicide Risk    [facility], it is important to learn how to keep safe from harming yourself.    Recognize the warning signs:  · Abrupt changes in personality, positive or negative- including increase in energy   · Giving away possessions  · Change in eating patterns- significant weight changes-  positive or negative  · Change in sleeping patterns- unable to sleep or sleeping all the time   · Unwillingness or inability to communicate  · Depression  · Unusual sadness, discouragement and loneliness  · Talk of wanting to die  · Neglect of personal appearance   · Rebelliousness- reckless behavior  · Withdrawal from people/activities they love  · Confusion- inability to concentrate     If you or a loved one observes any of these  behaviors or has concerns about self-harm, here's what you can do:  · Talk about it- your feelings and reasons for harming yourself  · Remove any means that you might use to hurt yourself (examples: pills, rope, extension cords, firearm)  · Get professional help from the community (Mental Health, Substance Abuse, psychological counseling)  · Do not be alone:Call your Safe Contact- someone whom you trust who will be there for you.  · Call your local CRISIS HOTLINE 782-3248 or 966-517-3141  · Call your local Children's Mobile Crisis Response Team [place] (055) 722-3804 or www.Qinti  · Call the toll free National Suicide Prevention Hotlines   · National Suicide Prevention Lifeline 335-728-NYRR (8538)  · National Hope Line Network 800-SUICIDE (971-4608)

## 2017-11-29 NOTE — OP REPORT
SURGEON:  Raheem Ennis MD.     ASSISTANT:  Daryl Donato PA-C.     PREOPERATIVE DIAGNOSIS:  right carpal tunnel syndrome.     POSTOPERATIVE DIAGNOSIS:  right carpal tunnel syndrome.     PROCEDURE:  right endoscopic carpal tunnel release.     ANESTHESIOLOGIST:  Popyee Garcia MD.     ANESTHESIA:  General.     COMPLICATIONS:  None noted.     DRAINS:  None.     SPECIMENS:  None.     ESTIMATED BLOOD LOSS:  Minimal.    TOURNIQUET TIME: less than 20 mins     INDICATIONS:  This patient is well known to me through my   outpatient clinic for the above-mentioned diagnosis.  The patient believes and I would   agree they have failed conservative treatment and are a candidate for the   above-mentioned procedure.  Prior to the procedure, the patient understood the risks,   benefits and alternatives to surgery.  The patient understood the risks to include,   but not limited to the risk of infection requiring repeat surgery, bleeding   requiring blood transfusion, nerve, blood vessel, tendon injury requiring   repair, chronic pain, chronic regional pain syndrome, failure to alleviate or   worsening of the symptoms requiring revision surgery, DVT, pulmonary embolism,  heart attack, stroke, and even death. The patient states despite these risks,   they wished to proceed with surgery.     DESCRIPTION OF PROCEDURE: The patient was met in the preoperative holding area.    The right hand was initialed as correct operative site. The patient had an   opportunity to ask questions, all questions were answered and informed consent  was obtained. The patient was brought to the operating room and laid supine on   the operating table.  All bony and dependent prominences were well padded.    General anesthesia was induced without noted complication.  Ancef was   administered for infection prophylaxis. The right upper extremity was prepped and  draped in the usual sterile fashion.  A formal time-out was performed, in   which all  parties agreed upon the correct patient, procedure, and operative   site.  I began the procedure by using Esmarch to exsanguinate the extremity   and inflate the tourniquet to 250 mmHg. I then made an approximately 1.5 cm   transverse incision just proximal to distal wrist crease over palmaris longus,  which I retracted radially.  I made a distally based flap in the antebrachial   fascia.  I then introduced the small and then a large carpal canal dilators   and then introduced the synovial elevator, palpated the undersurface of the   transverse carpal ligament and the hook of hamreno to confirm appropriate   location of the carpal canal. I then elevated the tenosynovium off the undersurface   of the transverse carpal ligament and then introduced the microaire single   portal device and released the transverse carpal ligament longitudinally in   its entirety under direct visualization as well as the distal forearm   antebrachial fascia with tenotomy scissors. I then deflated the tourniquet,   copiously irrigated the wound with normal saline, closed the incision with   interrupted 4-0 nylon suture, covered with sterile Xeroform, 4x4s, and a soft   dressing.  The patient awoke from anesthesia without noted complication and was   transferred in stable condition to PACU where she had no immediate post-operative  complaints.     POSTOPERATIVE PLAN:  The patient will be discharged to home per same day criteria,  sedentary use of the upper extremity and follow up in clinic in 10-14 days   for suture removal and wound check.

## 2017-12-20 ENCOUNTER — OFFICE VISIT (OUTPATIENT)
Dept: NEPHROLOGY | Facility: MEDICAL CENTER | Age: 62
End: 2017-12-20
Payer: COMMERCIAL

## 2017-12-20 VITALS
RESPIRATION RATE: 12 BRPM | DIASTOLIC BLOOD PRESSURE: 82 MMHG | WEIGHT: 122 LBS | OXYGEN SATURATION: 97 % | HEIGHT: 63 IN | SYSTOLIC BLOOD PRESSURE: 120 MMHG | HEART RATE: 78 BPM | TEMPERATURE: 98.8 F | BODY MASS INDEX: 21.62 KG/M2

## 2017-12-20 DIAGNOSIS — R32 URINARY INCONTINENCE, UNSPECIFIED TYPE: ICD-10-CM

## 2017-12-20 PROCEDURE — 99203 OFFICE O/P NEW LOW 30 MIN: CPT | Performed by: INTERNAL MEDICINE

## 2017-12-20 ASSESSMENT — ENCOUNTER SYMPTOMS
VOMITING: 0
NAUSEA: 0

## 2017-12-21 NOTE — PROGRESS NOTES
"Subjective:      Lidia Fitzpatrick is a 62 y.o. female who presents with Urinary Frequency            Patient underwent total hysterectomy a year ago, and since she's been complaining of intermittent urinary incontinence, patient is scheduled to see a urologist      Urinary Frequency   This is a chronic problem. The current episode started more than 1 month ago. The problem occurs intermittently. The problem has been unchanged. Associated symptoms include urinary symptoms. Pertinent negatives include no chest pain, nausea or vomiting.       Review of Systems   Cardiovascular: Negative for chest pain and leg swelling.   Gastrointestinal: Negative for nausea and vomiting.   Genitourinary: Negative for hematuria.          Objective:     /82   Pulse 78   Temp 37.1 °C (98.8 °F)   Resp 12   Ht 1.6 m (5' 2.99\")   Wt 55.3 kg (122 lb)   SpO2 97%   BMI 21.62 kg/m²      Physical Exam   Constitutional: She is oriented to person, place, and time. She appears well-developed and well-nourished. No distress.   HENT:   Nose: Nose normal.   Pulmonary/Chest: Effort normal.   Neurological: She is alert and oriented to person, place, and time.   Skin: She is not diaphoretic.   Psychiatric: She has a normal mood and affect. Her behavior is normal.     I have reviewed the abdominal ultrasound images with the patient          Assessment/Plan:     1. Urinary incontinence, unspecified type  No clear etiology  Patient to follow up with urologist  I have explained to the patient that her kidney function is normal  Avoid nephrotoxins like NSAIDs    Over 50% of this  30 minute visit was spent on counseling and coordination of care regarding diet, side effects, and complication.    "

## 2017-12-27 RX ORDER — LEVOTHYROXINE SODIUM 0.1 MG/1
100 TABLET ORAL
Qty: 90 TAB | Refills: 1 | Status: SHIPPED | OUTPATIENT
Start: 2017-12-27 | End: 2018-06-16 | Stop reason: SDUPTHER

## 2018-01-10 ENCOUNTER — APPOINTMENT (OUTPATIENT)
Dept: ENDOCRINOLOGY | Facility: MEDICAL CENTER | Age: 63
End: 2018-01-10
Payer: COMMERCIAL

## 2018-01-29 ENCOUNTER — OFFICE VISIT (OUTPATIENT)
Dept: ENDOCRINOLOGY | Facility: MEDICAL CENTER | Age: 63
End: 2018-01-29
Payer: COMMERCIAL

## 2018-01-29 VITALS
HEART RATE: 72 BPM | WEIGHT: 122 LBS | HEIGHT: 63 IN | DIASTOLIC BLOOD PRESSURE: 78 MMHG | SYSTOLIC BLOOD PRESSURE: 124 MMHG | BODY MASS INDEX: 21.62 KG/M2 | OXYGEN SATURATION: 97 %

## 2018-01-29 DIAGNOSIS — E06.5 THYROIDITIS, CHRONIC: ICD-10-CM

## 2018-01-29 DIAGNOSIS — E03.9 HYPOTHYROIDISM, UNSPECIFIED TYPE: ICD-10-CM

## 2018-01-29 DIAGNOSIS — M85.80 OSTEOPENIA, UNSPECIFIED LOCATION: ICD-10-CM

## 2018-01-29 PROCEDURE — 99213 OFFICE O/P EST LOW 20 MIN: CPT | Performed by: INTERNAL MEDICINE

## 2018-01-30 NOTE — PROGRESS NOTES
"Chief Complaint   Patient presents with   • Hypothyroidism     thyroiditis        HPI:    1.  Hypothyroidism            Clinically euthyroid take levothyroxine 100 µg per day. Recent T4 1.3 and TSH 0.8. No dose change indicated.    2.  Thyroiditis               Patient has an ultrasound appearance of thyroiditis but negative TPO and thyroglobulin antibodies. The gland itself is asymptomatic and not enlarging. On my examination today about normal size.    3.  Osteopenia             Recent bone density showing osteopenia but relatively stable compared to the year before. She has a very healthy active lifestyle and diet. Supplemental calcium and vitamin D and will be followed by PCP    ROS:      All other systems reported as negative or unchanged since last exam      Allergies:   Allergies   Allergen Reactions   • Morphine Unspecified     Hallucinations  RXN=24 years ago; patient reports \"sensitive to Morphine\" not a true allergy       Current medicines including changes today:  Current Outpatient Prescriptions   Medication Sig Dispense Refill   • levothyroxine (SYNTHROID) 100 MCG Tab Take 1 Tab by mouth Every morning on an empty stomach. 90 Tab 1   • multivitamin (THERAGRAN) Tab Take 1 Tab by mouth every day.     • Calcium Citrate-Vitamin D (CALCIUM + D PO) Take 1 Tab by mouth every day.     • Flaxseed, Linseed, (FLAX SEED OIL) 1000 MG Cap Take 1 Cap by mouth every day.     • [place] Berry 550 MG Cap Take 1 Cap by mouth every day.     • Ginkgo Biloba 40 MG Tab Take 1 Tab by mouth every day.     • Evening Primrose Oil 1000 MG Cap Take 1 Cap by mouth every day.     • b complex vitamins tablet Take 1 Tab by mouth every day.     • hydrocodone-acetaminophen (NORCO) 5-325 MG Tab per tablet Take 1-2 Tabs by mouth every four hours as needed. 20 Tab 0     No current facility-administered medications for this visit.         Past Medical History:   Diagnosis Date   • Anxiety    • CATARACT     megan IOL   • Chronic thyroiditis  " "    + US / neg ab   • Depression     mood swings   • Gynecological disorder    • Hypothyroidism    • Keratoconjunctivitis sicca    • multiple thyroid nodules     subcentimeter / 2016 heterogeneous okay   • Osteopenia 2005   • S/P hysterectomy with oophorectomy 2016    prolapse.   • Urinary incontinence 11/27/2017   • Urinary tract infection, site not specified        PHYSICAL EXAM:    /78   Pulse 72   Ht 1.6 m (5' 3\")   Wt 55.3 kg (122 lb)   SpO2 97%   BMI 21.61 kg/m²      Gen.   appears healthy    Skin   appropriate for sex and age    HEENT  unremarkable    Neck   thyroid gland is about normal size. Slightly firm. No palpable nodule in the gland or ulcer in the neck or supraclavicular areas    Heart  regular    Extremities  no edema    Neuro  gait and station normal    Psych  appropr    ASSESSMENT AND RECOMMENDATIONS    1. Hypothyroidism, unspecified type               Clinically euthyroid take levothyroxine 100 µg daily    2. Thyroiditis, chronic            Etiology unclear. Antibodies negative ultrasound appearance is typical    3. Osteopenia, unspecified location           Relatively stable without medication      DISPOSITION:  Return in one year       Daryl Lopez M.D.    Copies to: Deana Chao M.D. [phone]  "

## 2018-03-09 ENCOUNTER — OFFICE VISIT (OUTPATIENT)
Dept: MEDICAL GROUP | Age: 63
End: 2018-03-09
Payer: COMMERCIAL

## 2018-03-09 ENCOUNTER — HOSPITAL ENCOUNTER (OUTPATIENT)
Facility: MEDICAL CENTER | Age: 63
End: 2018-03-09
Attending: FAMILY MEDICINE
Payer: COMMERCIAL

## 2018-03-09 VITALS
TEMPERATURE: 99.5 F | BODY MASS INDEX: 21.69 KG/M2 | WEIGHT: 122.4 LBS | OXYGEN SATURATION: 95 % | HEIGHT: 63 IN | HEART RATE: 88 BPM | SYSTOLIC BLOOD PRESSURE: 114 MMHG | RESPIRATION RATE: 16 BRPM | DIASTOLIC BLOOD PRESSURE: 68 MMHG

## 2018-03-09 DIAGNOSIS — N95.2 ATROPHIC VAGINITIS: ICD-10-CM

## 2018-03-09 DIAGNOSIS — Z12.4 ROUTINE CERVICAL SMEAR: ICD-10-CM

## 2018-03-09 DIAGNOSIS — Z01.419 PAP SMEAR, LOW-RISK: ICD-10-CM

## 2018-03-09 DIAGNOSIS — Z11.51 SPECIAL SCREENING EXAMINATION FOR HUMAN PAPILLOMAVIRUS (HPV): ICD-10-CM

## 2018-03-09 DIAGNOSIS — Z01.419 PAP SMEAR, LOW-RISK: Primary | ICD-10-CM

## 2018-03-09 DIAGNOSIS — N89.8 VAGINAL DISCHARGE: ICD-10-CM

## 2018-03-09 LAB — AMBIGUOUS DTTM AMBI4: NORMAL

## 2018-03-09 PROCEDURE — 87480 CANDIDA DNA DIR PROBE: CPT

## 2018-03-09 PROCEDURE — 87624 HPV HI-RISK TYP POOLED RSLT: CPT

## 2018-03-09 PROCEDURE — 87510 GARDNER VAG DNA DIR PROBE: CPT

## 2018-03-09 PROCEDURE — 87660 TRICHOMONAS VAGIN DIR PROBE: CPT

## 2018-03-09 PROCEDURE — 99000 SPECIMEN HANDLING OFFICE-LAB: CPT | Performed by: FAMILY MEDICINE

## 2018-03-09 PROCEDURE — 99396 PREV VISIT EST AGE 40-64: CPT | Mod: 25 | Performed by: FAMILY MEDICINE

## 2018-03-09 PROCEDURE — 88175 CYTOPATH C/V AUTO FLUID REDO: CPT

## 2018-03-09 PROCEDURE — 87591 N.GONORRHOEAE DNA AMP PROB: CPT

## 2018-03-09 PROCEDURE — 87491 CHLMYD TRACH DNA AMP PROBE: CPT

## 2018-03-09 ASSESSMENT — PATIENT HEALTH QUESTIONNAIRE - PHQ9: CLINICAL INTERPRETATION OF PHQ2 SCORE: 0

## 2018-03-10 LAB
AMBIGUOUS DTTM AMBI4: NORMAL
C TRACH DNA GENITAL QL NAA+PROBE: NEGATIVE
CANDIDA DNA VAG QL PROBE+SIG AMP: NEGATIVE
CYTOLOGY REG CYTOL: NORMAL
G VAGINALIS DNA VAG QL PROBE+SIG AMP: POSITIVE
HPV HR 12 DNA CVX QL NAA+PROBE: NEGATIVE
HPV16 DNA SPEC QL NAA+PROBE: NEGATIVE
HPV18 DNA SPEC QL NAA+PROBE: NEGATIVE
N GONORRHOEA DNA GENITAL QL NAA+PROBE: NEGATIVE
SIGNIFICANT IND 70042: NORMAL
SITE SITE: NORMAL
SOURCE SOURCE: NORMAL
SPECIMEN SOURCE: NORMAL
SPECIMEN SOURCE: NORMAL
T VAGINALIS DNA VAG QL PROBE+SIG AMP: NEGATIVE

## 2018-03-12 RX ORDER — METRONIDAZOLE 500 MG/1
500 TABLET ORAL 2 TIMES DAILY
Qty: 14 TAB | Refills: 0 | Status: SHIPPED | OUTPATIENT
Start: 2018-03-12 | End: 2019-03-15

## 2018-03-12 NOTE — PROGRESS NOTES
Subjective:   CC: pap    HPI:     Lidia Fitzpatrick is a 62 y.o. female, established patient of the clinic, presents with the following concerns:     , not sexually active, s/p MITCH-BSO secondary to unterine prolapse in 2016. Cervix was left behind.   Contraception: none, post-menopausal.   Hx of STD: none  Hx of abnormal pap: none  No LMP recorded. Patient has had a hysterectomy.  Denies fever, chills, pelvic pain, dyspareunia, abnormal vaginal bleeding, genital rash.   Denies nipple bleeding, discharge, breast mass, familial/personal hx of breast/gyn malignancy.   Last mammogram: 3/2017 , Finding:negative    Pt c/o chronic leakage of clear fluid from the vaginal since after MITCH-BSO surgery in . Symptoms are very distressful to patient. She spoke to her gastroenterologist who recommended transvaginal US or follow up with Dr. Harris.     Current medicines (including changes today)  Current Outpatient Prescriptions   Medication Sig Dispense Refill   • levothyroxine (SYNTHROID) 100 MCG Tab Take 1 Tab by mouth Every morning on an empty stomach. 90 Tab 1   • multivitamin (THERAGRAN) Tab Take 1 Tab by mouth every day.     • Calcium Citrate-Vitamin D (CALCIUM + D PO) Take 1 Tab by mouth every day.     • Flaxseed, Linseed, (FLAX SEED OIL) 1000 MG Cap Take 1 Cap by mouth every day.     • [place] Berry 550 MG Cap Take 1 Cap by mouth every day.     • Ginkgo Biloba 40 MG Tab Take 1 Tab by mouth every day.     • b complex vitamins tablet Take 1 Tab by mouth every day.       No current facility-administered medications for this visit.      She  has a past medical history of Anxiety; CATARACT; Chronic thyroiditis; Depression; Gynecological disorder; Hypothyroidism; Keratoconjunctivitis sicca; multiple thyroid nodules; Osteopenia (); S/P hysterectomy with oophorectomy (); Urinary incontinence (2017); and Urinary tract infection, site not specified. She also has no past medical history of Addisons disease  "(CMS-HCC); Adrenal disorder; Allergy; Anemia; Arthritis; Blood transfusion; Clotting disorder; COPD; Cushings syndrome; GERD (gastroesophageal reflux disease); Goiter; Headache(784.0); HIV (human immunodeficiency virus infection); Hyperlipidemia; IBD (inflammatory bowel disease); Meningitis; Migraine; Muscle disorder; Parathyroid disorder (CMS-HCC); Pituitary disease (CMS-HCC); Substance abuse; or Ulcer (CMS-HCC).    I personally reviewed patient's problem list, allergies, medications, family hx, social hx with patient and update [facility].     REVIEW OF SYSTEMS:  CONSTITUTIONAL:  Denies night sweats, fatigue, malaise, lethargy, fever or chills.  RESPIRATORY:  Denies cough, wheeze, hemoptysis, or shortness of breath.  CARDIOVASCULAR:  Denies chest pains, palpitations, pedal edema     Objective:     Blood pressure 114/68, pulse 88, temperature 37.5 °C (99.5 °F), resp. rate 16, height 1.6 m (5' 3\"), weight 55.5 kg (122 lb 6.4 oz), SpO2 95 %, not currently breastfeeding. Body mass index is 21.68 kg/m².    Physical Exam:  Constitutional: awake, alert, in no distress.  Skin: Warm, dry, good turgor, no rashes, bruises, ulcers in visible areas.  Eye: conjunctiva clear, lids neg for edema or lesions.  Respiratory: Unlabored respiratory effort, lungs clear to auscultation, no wheezes, no rhonchi, no rales.  Cardiovascular: Normal S1, S2, no murmur, no pedal edema.  Psych: Oriented x3, affect and mood wnl, intact judgement and insight.  GYN: Unremarkable external genital anatomy. Labia and introitus are pale and shiny. Negative abnormal vaginal bleeding, no vaginal rash, cervix in mid position, no concerning lesions on cervix, no cervical motion tenderness, copious clear fluid noted from cervical canal, uterus surgically absent.     Assessment and Plan:   The following treatment plan was discussed    1. Pap smear, low-risk  - THINPREP PAP W/HPV AND CTNG; Future    2. Vaginal discharge after hysterectomy with cervix intact. "   Persistent clear vaginal discharge after MITCH-BSO in 2016 for uterine prolapse, exam noted for copious clear vaginal discharge from endocervical canals concerning for dehiscence, vesicovaginal fistula. Will do basic tests to r/o infection. Pt is referred to uro-gyn. Plans:   - US-GYN-PELVIS TRANSVAGINAL; Future  - VAGINAL PATHOGENS DNA PANEL; Future  - Chlamydia/GC  - Ref Uro-gynecology     3. Atrophic vaginitis  vaginal exam noted for atrophic vaginitis, pt is asymptomatic. Will monitor.       Deana Chao M.D.      Followup: Return in about 6 months (around 9/9/2018) for Multiple issues.    Please note that this dictation was created using voice recognition software. I have made every reasonable attempt to correct obvious errors, but I expect that there are errors of grammar and possibly content that I did not discover before finalizing the note.

## 2018-03-13 ENCOUNTER — TELEPHONE (OUTPATIENT)
Dept: MEDICAL GROUP | Age: 63
End: 2018-03-13

## 2018-03-13 NOTE — TELEPHONE ENCOUNTER
----- Message from Deana Chao M.D. sent at 3/12/2018  9:43 AM PDT -----  Please call the patient to ensure that she receives my my-chart message regarding the results of her recent tests. Patient needs to  metronidazole at per pharmacy to treat bacterial vaginosis.

## 2018-03-16 ENCOUNTER — APPOINTMENT (OUTPATIENT)
Dept: RADIOLOGY | Facility: MEDICAL CENTER | Age: 63
End: 2018-03-16
Attending: FAMILY MEDICINE
Payer: COMMERCIAL

## 2018-03-23 ENCOUNTER — HOSPITAL ENCOUNTER (OUTPATIENT)
Dept: RADIOLOGY | Facility: MEDICAL CENTER | Age: 63
End: 2018-03-23
Attending: FAMILY MEDICINE
Payer: COMMERCIAL

## 2018-03-23 DIAGNOSIS — Z12.31 VISIT FOR SCREENING MAMMOGRAM: ICD-10-CM

## 2018-03-23 PROCEDURE — 77067 SCR MAMMO BI INCL CAD: CPT

## 2018-03-30 ENCOUNTER — HOSPITAL ENCOUNTER (OUTPATIENT)
Dept: RADIOLOGY | Facility: MEDICAL CENTER | Age: 63
End: 2018-03-30
Attending: FAMILY MEDICINE
Payer: COMMERCIAL

## 2018-03-30 DIAGNOSIS — N89.8 VAGINAL DISCHARGE: ICD-10-CM

## 2018-03-30 PROCEDURE — 76830 TRANSVAGINAL US NON-OB: CPT

## 2018-06-18 RX ORDER — LEVOTHYROXINE SODIUM 100 MCG
TABLET ORAL
Qty: 90 TAB | Refills: 1 | Status: SHIPPED | OUTPATIENT
Start: 2018-06-18 | End: 2018-12-27 | Stop reason: SDUPTHER

## 2018-12-27 RX ORDER — LEVOTHYROXINE SODIUM 100 MCG
100 TABLET ORAL
Qty: 90 TAB | Refills: 1 | Status: SHIPPED | OUTPATIENT
Start: 2018-12-27 | End: 2020-01-21

## 2019-01-29 ENCOUNTER — OFFICE VISIT (OUTPATIENT)
Dept: ENDOCRINOLOGY | Facility: MEDICAL CENTER | Age: 64
End: 2019-01-29
Payer: COMMERCIAL

## 2019-01-29 VITALS
DIASTOLIC BLOOD PRESSURE: 66 MMHG | WEIGHT: 122.6 LBS | OXYGEN SATURATION: 97 % | SYSTOLIC BLOOD PRESSURE: 122 MMHG | HEART RATE: 91 BPM | BODY MASS INDEX: 21.72 KG/M2 | HEIGHT: 63 IN

## 2019-01-29 DIAGNOSIS — M85.88 OSTEOPENIA OF LUMBAR SPINE: ICD-10-CM

## 2019-01-29 DIAGNOSIS — E06.5 THYROIDITIS, CHRONIC: ICD-10-CM

## 2019-01-29 DIAGNOSIS — E03.9 HYPOTHYROIDISM, UNSPECIFIED TYPE: ICD-10-CM

## 2019-01-29 DIAGNOSIS — Z78.0 MENOPAUSE: ICD-10-CM

## 2019-01-29 PROCEDURE — 99214 OFFICE O/P EST MOD 30 MIN: CPT | Performed by: INTERNAL MEDICINE

## 2019-01-29 RX ORDER — LEVOTHYROXINE SODIUM 100 MCG
100 TABLET ORAL
Qty: 90 TAB | Refills: 3 | Status: SHIPPED | OUTPATIENT
Start: 2019-01-29 | End: 2019-03-15

## 2019-01-29 RX ORDER — AMPICILLIN TRIHYDRATE 250 MG
CAPSULE ORAL
COMMUNITY
End: 2021-11-03

## 2019-01-30 NOTE — PROGRESS NOTES
"Chief Complaint   Patient presents with   • Hypothyroidism      ?  Chronic thyroiditis   • Osteopenia        HPI:    See assessment and recommendations below    ROS:  Constitutional   feels generally healthy  Eyes   vision stable  ENT    no pain or epistaxis, no unusual congestion  Cardiac   no angina, no arrhythmia  Respiratory   no hemoptysis, no unusual dyspnea, no cough  GI    no pain, indigestion, or unexpected bowel change     no voiding symptoms  Musculoskeletal    no unusual or new pains  Skin   no suspicious or concerning lesions  Neuro  no unusual weakness or loss of function  Psych   appears alert and appropriate  Lymph   no new or unusual lumps or nodules      Allergies:   Allergies   Allergen Reactions   • Morphine Unspecified     Hallucinations  RXN=24 years ago; patient reports \"sensitive to Morphine\" not a true allergy       Current medicines including changes today:  Current Outpatient Prescriptions   Medication Sig Dispense Refill   • Cinnamon 500 MG Cap Take  by mouth.     • SYNTHROID 100 MCG Tab Take 1 Tab by mouth every morning before breakfast. 90 Tab 1   • multivitamin (THERAGRAN) Tab Take 1 Tab by mouth every day.     • Calcium Citrate-Vitamin D (CALCIUM + D PO) Take 1 Tab by mouth every day.     • Flaxseed, Linseed, (FLAX SEED OIL) 1000 MG Cap Take 1 Cap by mouth every day.     • [place] Berry 550 MG Cap Take 1 Cap by mouth every day.     • Ginkgo Biloba 40 MG Tab Take 1 Tab by mouth every day.     • b complex vitamins tablet Take 1 Tab by mouth every day.     • metroNIDAZOLE (FLAGYL) 500 MG Tab Take 1 Tab by mouth 2 Times a Day. (Patient not taking: Reported on 1/29/2019) 14 Tab 0     No current facility-administered medications for this visit.         Past Medical History:   Diagnosis Date   • Anxiety    • CATARACT     megan IOL   • Chronic thyroiditis      + US / neg ab   • Depression     mood swings   • Gynecological disorder    • Hypothyroidism    • Keratoconjunctivitis sicca    • " "multiple thyroid nodules     subcentimeter / 2016 heterogeneous okay   • Osteopenia 2005   • S/P hysterectomy with oophorectomy 2016    prolapse.   • Urinary incontinence 11/27/2017   • Urinary tract infection, site not specified        PHYSICAL EXAM:    /66 (BP Location: Right arm, Patient Position: Sitting, BP Cuff Size: Adult)   Pulse 91   Ht 1.6 m (5' 3\")   Wt 55.6 kg (122 lb 9.6 oz)   SpO2 97%   BMI 21.72 kg/m²     Gen.   appears healthy, good posterior, no kyphosis    Skin   appropriate for sex and age    HEENT  unremarkable    Neck   thyroid gland is about normal size and consistency.  No palpable nodules    Heart  regular    Extremities  no edema    Neuro  gait and station normal    Psych  appropriate    ASSESSMENT AND RECOMMENDATIONS    1. Hypothyroidism, unspecified type            Presumed due to chronic thyroiditis is seen on ultrasound but antibody negative           Patient feels well and is clinically euthyroid taking brand Synthroid 100 mcg/day           Current TSH is 1.5 and free T4 is 1.4.  No dose change indicated    2. Thyroiditis, chronic             This is based on ultrasound heterogeneous echotexture.              TPO and antithyroglobulin and are negative    3. Osteopenia of lumbar spine              No new fractures or developments.               Very favorable and active lifestyle including weightbearing exercises.               Take supplemental calcium and vitamin D but cannot tell me the doses                She will include a vitamin D level with her next lab tests                 Bone density will be scheduled for May 2019    DISPOSITION: If stable return in 1 year      Daryl Lopez M.D.    Copies to: Deana Chao M.D. [phone]  "

## 2019-03-15 ENCOUNTER — OFFICE VISIT (OUTPATIENT)
Dept: MEDICAL GROUP | Age: 64
End: 2019-03-15
Payer: COMMERCIAL

## 2019-03-15 VITALS
WEIGHT: 124.2 LBS | BODY MASS INDEX: 22.01 KG/M2 | TEMPERATURE: 98.5 F | DIASTOLIC BLOOD PRESSURE: 62 MMHG | HEIGHT: 63 IN | OXYGEN SATURATION: 95 % | SYSTOLIC BLOOD PRESSURE: 112 MMHG | HEART RATE: 68 BPM

## 2019-03-15 DIAGNOSIS — M85.80 OSTEOPENIA, UNSPECIFIED LOCATION: ICD-10-CM

## 2019-03-15 DIAGNOSIS — E03.9 HYPOTHYROIDISM, UNSPECIFIED TYPE: ICD-10-CM

## 2019-03-15 DIAGNOSIS — R73.03 PREDIABETES: ICD-10-CM

## 2019-03-15 DIAGNOSIS — N95.2 ATROPHIC VAGINITIS: ICD-10-CM

## 2019-03-15 DIAGNOSIS — Z00.00 PE (PHYSICAL EXAM), ANNUAL: Primary | ICD-10-CM

## 2019-03-15 PROBLEM — E06.5 THYROIDITIS, CHRONIC: Status: RESOLVED | Noted: 2017-01-10 | Resolved: 2019-03-15

## 2019-03-15 PROCEDURE — 99396 PREV VISIT EST AGE 40-64: CPT | Performed by: FAMILY MEDICINE

## 2019-03-15 ASSESSMENT — PATIENT HEALTH QUESTIONNAIRE - PHQ9: CLINICAL INTERPRETATION OF PHQ2 SCORE: 0

## 2019-03-16 NOTE — PROGRESS NOTES
Subjective:   CC: annual PE     HPI:     Lidia Fitzpatrick is a 63 y.o. female who is an established patient of the clinic, presents with the following concerns:     The patient has a history of chronic prediabetes, hypothyroidism, osteopenia, and atrophic vaginitis. She is complaint with her medications and denies any side effects from them.     Patient reports history of hypothyroidism well controlled with Synthroid 100mcg daily. She continues to follow with Dr. Lopez.    Current medicines (including changes today)  Current Outpatient Prescriptions   Medication Sig Dispense Refill   • Cinnamon 500 MG Cap Take  by mouth.     • SYNTHROID 100 MCG Tab Take 1 Tab by mouth every morning before breakfast. 90 Tab 1   • multivitamin (THERAGRAN) Tab Take 1 Tab by mouth every day.     • Calcium Citrate-Vitamin D (CALCIUM + D PO) Take 1 Tab by mouth every day.     • Flaxseed, Linseed, (FLAX SEED OIL) 1000 MG Cap Take 1 Cap by mouth every day.     • [place] Berry 550 MG Cap Take 1 Cap by mouth every day.     • Ginkgo Biloba 40 MG Tab Take 1 Tab by mouth every day.     • b complex vitamins tablet Take 1 Tab by mouth every day.       No current facility-administered medications for this visit.      She  has a past medical history of Anxiety; CATARACT; Chronic thyroiditis; Depression; Gynecological disorder; Hypothyroidism; Keratoconjunctivitis sicca; multiple thyroid nodules; Osteopenia (2005); S/P hysterectomy with oophorectomy (2016); Urinary incontinence (11/27/2017); and Urinary tract infection, site not specified. She also has no past medical history of Addisons disease ([facility]); Adrenal disorder; Allergy; Anemia; Arthritis; Blood transfusion; Clotting disorder; COPD; Cushings syndrome; GERD (gastroesophageal reflux disease); Goiter; Headache(784.0); HIV (human immunodeficiency virus infection); Hyperlipidemia; IBD (inflammatory bowel disease); Meningitis; Migraine; Muscle disorder; Parathyroid disorder ([facility]); Pituitary  "disease (HCC); Substance abuse (HCC); or Ulcer.    I personally reviewed patient's problem list, allergies, medications, family hx, social hx with patient and update [facility].     REVIEW OF SYSTEMS:  CONSTITUTIONAL:  Denies night sweats, fatigue, malaise, lethargy, fever or chills.  RESPIRATORY:  Denies cough, wheeze, hemoptysis, or shortness of breath.  CARDIOVASCULAR:  Denies chest pains, palpitations, pedal edema     Objective:     Blood pressure 112/62, pulse 68, temperature 36.9 °C (98.5 °F), temperature source Temporal, height 1.6 m (5' 3\"), weight 56.3 kg (124 lb 3.2 oz), SpO2 95 %, not currently breastfeeding. Body mass index is 22 kg/m².    Physical Exam:  Constitutional: awake, alert, in no distress.  Skin: Warm, dry, good turgor, no rashes, bruises, ulcers in visible areas.  Eye: conjunctiva clear, lids neg for edema or lesions.  ENMT: TM and auditory canals wnl. Oral and nasal mucosa wnl. Lips without lesions, good dentition, oropharynx clear.  Neck: Trachea midline, no masses, no thyromegaly. No cervical or supraclavicular lymphadenopathy  Respiratory: Unlabored respiratory effort, lungs clear to auscultation, no wheezes, no rales.  Cardiovascular: Normal S1, S2, no murmur, no pedal edema.  Psych: Oriented x3, affect and mood wnl, intact judgement and insight.       Assessment and Plan:   The following treatment plan was discussed    1. Prediabetes  - Pt was counseled on dietary modification, weight loss   - Recommended moderate intensity exercise at least 30 minutes per day x 5 days per week.  - Follow up in 6 months.     2. Hypothyroidism, unspecified type  Chronic, currently taking levothyroxine 100 mcg daily.  Patient is working with Dr. Lopez.  -Continue levothyroxine and follow-up with Dr. Lopez as directed    3. Osteopenia, unspecified location  - Calcium and Vitamin D  - Weight-bearing aerobic and strengthening exercises can decrease risk of falls and fractures by improving muscle strength, " coordination, balance, and mobility  - Limit caffeine intake to 2 or fewer servings per day  - Limit alcohol consumption to one drink per day  - pt was counseled on fall risk prevention and annual eye exam     4. Atrophic vaginitis  Chronic, mild, not sexually active, does not require treatment at this time.  We will continue routine monitoring.    5. Annual PE   - pt is counseled on diet, exercise, vitamin supplement, mental health, sleep, stress  - discussed screening guidelines for pap, STD, mammogram, colonoscopy and vaccine recommendations.        Deana Chao M.D.    Followup: Return for As needed.    Please note that this dictation was created using voice recognition software and/or scribes. I have made every reasonable attempt to correct obvious errors, but I expect that there are errors of grammar and possibly content that I did not discover before finalizing the note.     Sonya RODRÍGUEZ (Larisaibe), am scribing for, and in the presence of, Deana Chao M.D.    Electronically signed by: Sonya Means (Josue), 3/15/2019    IDeana M.D. personally performed the services described in this documentation, as scribed by Sonya Means in my presence, and it is both accurate and complete.

## 2019-05-24 ENCOUNTER — OFFICE VISIT (OUTPATIENT)
Dept: CARDIOLOGY | Facility: MEDICAL CENTER | Age: 64
End: 2019-05-24
Payer: COMMERCIAL

## 2019-05-24 VITALS
HEIGHT: 63 IN | WEIGHT: 124 LBS | OXYGEN SATURATION: 95 % | HEART RATE: 62 BPM | SYSTOLIC BLOOD PRESSURE: 120 MMHG | BODY MASS INDEX: 21.97 KG/M2 | DIASTOLIC BLOOD PRESSURE: 72 MMHG

## 2019-05-24 DIAGNOSIS — E78.5 DYSLIPIDEMIA: Chronic | ICD-10-CM

## 2019-05-24 PROCEDURE — 99213 OFFICE O/P EST LOW 20 MIN: CPT | Performed by: INTERNAL MEDICINE

## 2019-05-24 ASSESSMENT — ENCOUNTER SYMPTOMS
PND: 0
WEAKNESS: 0
PALPITATIONS: 0
FEVER: 0
SHORTNESS OF BREATH: 0
FOCAL WEAKNESS: 0
ABDOMINAL PAIN: 0
COUGH: 0
BLURRED VISION: 0
CLAUDICATION: 0
DIZZINESS: 0
BRUISES/BLEEDS EASILY: 0
FALLS: 0
NAUSEA: 0
SORE THROAT: 0
CHILLS: 0

## 2019-05-24 NOTE — PROGRESS NOTES
No chief complaint on file.      Subjective:   Lidia Fitzpatrick is a 63 y.o. female who presents today for follow-up of her history of dyslipidemia well-controlled on diet    She is been doing well she has a bit of worry about her heart health given her father's coronary disease    She is vegetarian    Past Medical History:   Diagnosis Date   • Anxiety    • CATARACT     megan IOL   • Chronic thyroiditis      + US / neg ab   • Depression     mood swings   • Dyslipidemia    • Gynecological disorder    • Hypothyroidism    • Keratoconjunctivitis sicca    • multiple thyroid nodules     subcentimeter / 2016 heterogeneous okay   • Osteopenia 2005   • S/P hysterectomy with oophorectomy 2016    prolapse.   • Urinary incontinence 11/27/2017   • Urinary tract infection, site not specified      Past Surgical History:   Procedure Laterality Date   • CARPAL TUNNEL ENDOSCOPIC Right 11/29/2017    Procedure: CARPAL TUNNEL ENDOSCOPIC;  Surgeon: Raheem Ennis M.D.;  Location: [facility];  Service: Orthopedics   • HYSTERECTOMY ROBOTIC  11/21/2016    Procedure: HYSTERECTOMY ROBOTIC SUPRA CERVICAL WITH BILATERAL SALPINGO-OOPHORECTOMY;  Surgeon: Columba Estrada M.D.;  Location: [facility];  Service:    • COLPOSACROPEXY ROBOTIC  11/21/2016    Procedure: COLPOSACROPEXY ROBOTIC;  Surgeon: Columba Estrada M.D.;  Location: [facility];  Service:    • CYSTOSCOPY  11/21/2016    Procedure: CYSTOSCOPY;  Surgeon: Columba Estrada M.D.;  Location: [facility];  Service:    • VAGINAL PERINEAL EXAM REPAIR  11/21/2016    Procedure: VAGINAL PERINEAL EXAM REPAIR FOR PERINEORRHAPHY, LEVATORPLASTY;  Surgeon: Columba Estrada M.D.;  Location: SUR[facility]ERY [facility];  Service:    • BLADDER SUSPENSION     • TUBAL COAGULATION LAPAROSCOPIC BILATERAL       Family History   Problem Relation Age of Onset   • Hyperlipidemia Mother    • Hyperlipidemia Father    • Heart Disease Father         PCI  "  • Stroke Maternal Grandmother 87   • Cancer Paternal Grandfather         Stomach   • Cancer Maternal Aunt 62        Breast   • Cancer Paternal Uncle         digestive, unknown type     Social History     Social History   • Marital status:      Spouse name: N/A   • Number of children: N/A   • Years of education: N/A     Occupational History   • Not on file.     Social History Main Topics   • Smoking status: Never Smoker   • Smokeless tobacco: Never Used   • Alcohol use No   • Drug use: No   • Sexual activity: Not Currently     Partners: Male     Birth control/ protection: Post-Menopausal     Other Topics Concern   • Not on file     Social History Narrative   • No narrative on file     Allergies   Allergen Reactions   • Morphine Unspecified     Hallucinations  RXN=24 years ago; patient reports \"sensitive to Morphine\" not a true allergy     Outpatient Encounter Prescriptions as of 5/24/2019   Medication Sig Dispense Refill   • Cinnamon 500 MG Cap Take  by mouth.     • SYNTHROID 100 MCG Tab Take 1 Tab by mouth every morning before breakfast. 90 Tab 1   • multivitamin (THERAGRAN) Tab Take 1 Tab by mouth every day.     • Calcium Citrate-Vitamin D (CALCIUM + D PO) Take 1 Tab by mouth every day.     • Flaxseed, Linseed, (FLAX SEED OIL) 1000 MG Cap Take 1 Cap by mouth every day.     • Herrera Berry 550 MG Cap Take 1 Cap by mouth every day.     • Ginkgo Biloba 40 MG Tab Take 1 Tab by mouth every day.     • b complex vitamins tablet Take 1 Tab by mouth every day.       No facility-administered encounter medications on file as of 5/24/2019.      Review of Systems   Constitutional: Negative for chills and fever.   HENT: Negative for sore throat.    Eyes: Negative for blurred vision.   Respiratory: Negative for cough and shortness of breath.    Cardiovascular: Negative for chest pain, palpitations, claudication, leg swelling and PND.   Gastrointestinal: Negative for abdominal pain and nausea.   Musculoskeletal: " "Negative for falls and joint pain.   Skin: Negative for rash.   Neurological: Negative for dizziness, focal weakness and weakness.   Endo/Heme/Allergies: Does not bruise/bleed easily.        Objective:   /72 (BP Location: Left arm, Patient Position: Sitting, BP Cuff Size: Adult)   Pulse 62   Ht 1.6 m (5' 3\")   Wt 56.2 kg (124 lb)   SpO2 95%   BMI 21.97 kg/m²     Physical Exam   Constitutional: No distress.   HENT:   Mouth/Throat: Oropharynx is clear and moist. No oropharyngeal exudate.   Eyes: No scleral icterus.   Neck: No JVD present.   Cardiovascular: Normal rate and normal heart sounds.  Exam reveals no gallop and no friction rub.    No murmur heard.  Pulmonary/Chest: No respiratory distress. She has no wheezes. She has no rales.   Abdominal: Soft. Bowel sounds are normal.   Musculoskeletal: She exhibits no edema.   Neurological: She is alert.   Skin: No rash noted. She is not diaphoretic.   Psychiatric: She has a normal mood and affect.     We reviewed her labs from [facility] from December LDL came down to 92 HDL 64    She is also had remote carotid and echocardiogram that were normal    Assessment:     1. Dyslipidemia         Medical Decision Making:  Today's Assessment / Status / Plan:     It was my pleasure to meet with Ms. Fitzpatrick.    Encouraged heart healthy diet and lifestyle    Ms. Fitzpatrick does not require regular cardiology follow up, I have advised her to call our office or e-mail using my MyChart if needed.    It is my pleasure to participate in the care of Ms. Fitzpatrick.  Please do not hesitate to contact me with questions or concerns.    Kedar Oliva MD PhD FACC  Cardiologist [facility]    Please note that this dictation was created using voice recognition software. I have worked with consultants from the vendor as well as technical experts from [facility] to optimize the interface. I have made every reasonable attempt to correct " obvious errors, but I expect that there are errors of grammar and possibly content I did not discover before finalizing the note.

## 2019-05-24 NOTE — PATIENT INSTRUCTIONS
Please look into the following diets and incorporate them into your diet    LOW SALT DIET   KEEP YOUR SODIUM EQUAL TO CALORIES AND NO MORE THAN DOUBLE THE CALORIES FOR A LOW SALT DIET    FOR TREATMENT OR PREVENTION OF CORONARY ARTERY DISEASE    [place] - [facility]    Dr. Quintero[facility] for Reversing Heart Disease - Aldo Yu's Cardiologist    [facility]    Dr Harrison - [place] [facility] (book and documentary)      FOR TREATMENT OF BLOOD PRESSURE  DASH DIET - American Heart Association for treatment of HYPERTENSION    FOR TREATMENT OF BAD CHOLESTEROL/FATS  REDUCE PROCESSED SUGAR AS MUCH AS POSSIBLE  INCREASE WHOLE GRAINS/VEGETABLES    Lowering total cholesterol and LDL (bad) cholesterol:  - Eat leaner cuts of meat, or eliminate altogether if possible red meat, and frequently substitute fish or chicken.  - Limit saturated fat to no more than 7-10% of total calories no more than 10 g per day is recommended. Some sources of saturated fat include butter, animal fats, hydrogenated vegetable fats and oils, many desserts, whole milk dairy products.  - Replaced saturated fats with polyunsaturated fats and monounsaturated fats. Foods high in monounsaturated fat include nuts, although well, canola oil, avocados, and olives.  - Limit trans fat (processed foods) and replaced with fresh fruits and vegetables  - Recommend nonfat dairy products  - Increase substantially the amount of soluble fiber intake (legumes such as beans, fruit, whole grains).  - Consider nutritional supplements: plant sterile spreads such as Benecol, fish oil,  flaxseed oil, omega-3 acids capsules 1000 mg twice a day, or viscous fiber such as Metamucil  - Attain ideal weight and regular exercise (at least 30 minutes per day of walking)    Lowering triglycerides:  - Reduce intake of simple sugar: Desserts, candy, pastries, honey, sodas, sugared cereals, yogurt, Gatorade, sports bars, canned  fruit, smoothies, fruit juice, coffee drinks  - Reduced intake of refined starches: Refined Pasta  - Reduce or abstain from alcohol  - Increase omega-3 fatty acids: [place], Trout, Mackerel, [place], [place] tuna and supplements  - Attain ideal weight and regular exercise (at least 30 minutes per day of walking)      Elevating HDL (good) cholesterol:  - Increase physical activity  - Seasoned foods with garlic and onions  - Increase omega-3 fatty acids and supplements as listed above  - Incorporating appropriate amounts of monounsaturated fats such as nuts, olive oil, canola oil, avocados, olives  - Stop smoking  - Attain ideal weight and regular exercise (at least 30 minutes per day of walking)    Please work on increasing these foods in your diet to increase your potassium levels    Highest potassium foods  Dried figs, molasses, seaweed    High potassium foods  Dried fruits (dates, prunes), nuts, avocados, bran cereal, wheat germ, lima beans    Potassium-rich food  Vegetables-spinach, tomatoes, broccoli, winter squash, beets, carrots, cauliflower, potatoes    Fruits-bananas, cantaloupe, kiwis, oranges, mangoes      *Adapted: Meghan ANGELES. Hypokalemia. [place] Journal of Medicine 1998; 339:451.

## 2019-05-30 ENCOUNTER — APPOINTMENT (RX ONLY)
Dept: URBAN - METROPOLITAN AREA CLINIC 35 | Facility: CLINIC | Age: 64
Setting detail: DERMATOLOGY
End: 2019-05-30

## 2019-05-30 DIAGNOSIS — Z71.89 OTHER SPECIFIED COUNSELING: ICD-10-CM

## 2019-05-30 DIAGNOSIS — D22 MELANOCYTIC NEVI: ICD-10-CM

## 2019-05-30 DIAGNOSIS — L81.4 OTHER MELANIN HYPERPIGMENTATION: ICD-10-CM

## 2019-05-30 DIAGNOSIS — L29.89 OTHER PRURITUS: ICD-10-CM

## 2019-05-30 DIAGNOSIS — L82.1 OTHER SEBORRHEIC KERATOSIS: ICD-10-CM

## 2019-05-30 DIAGNOSIS — L29.8 OTHER PRURITUS: ICD-10-CM

## 2019-05-30 PROBLEM — E03.9 HYPOTHYROIDISM, UNSPECIFIED: Status: ACTIVE | Noted: 2019-05-30

## 2019-05-30 PROBLEM — L70.0 ACNE VULGARIS: Status: ACTIVE | Noted: 2019-05-30

## 2019-05-30 PROBLEM — D22.5 MELANOCYTIC NEVI OF TRUNK: Status: ACTIVE | Noted: 2019-05-30

## 2019-05-30 PROBLEM — D22.61 MELANOCYTIC NEVI OF RIGHT UPPER LIMB, INCLUDING SHOULDER: Status: ACTIVE | Noted: 2019-05-30

## 2019-05-30 PROCEDURE — 99213 OFFICE O/P EST LOW 20 MIN: CPT

## 2019-05-30 PROCEDURE — ? COUNSELING

## 2019-05-30 PROCEDURE — ? ADDITIONAL NOTES

## 2019-05-30 ASSESSMENT — LOCATION SIMPLE DESCRIPTION DERM
LOCATION SIMPLE: RIGHT LOWER BACK
LOCATION SIMPLE: RIGHT SHOULDER
LOCATION SIMPLE: RIGHT LOWER BACK
LOCATION SIMPLE: LEFT BREAST
LOCATION SIMPLE: RIGHT UPPER BACK
LOCATION SIMPLE: POSTERIOR NECK

## 2019-05-30 ASSESSMENT — LOCATION ZONE DERM
LOCATION ZONE: TRUNK
LOCATION ZONE: ARM
LOCATION ZONE: TRUNK
LOCATION ZONE: NECK

## 2019-05-30 ASSESSMENT — LOCATION DETAILED DESCRIPTION DERM
LOCATION DETAILED: RIGHT SUPERIOR MEDIAL UPPER BACK
LOCATION DETAILED: RIGHT POSTERIOR SHOULDER
LOCATION DETAILED: RIGHT INFERIOR LATERAL MIDBACK
LOCATION DETAILED: LEFT MEDIAL BREAST 9-10:00 REGION
LOCATION DETAILED: RIGHT INFERIOR LATERAL MIDBACK
LOCATION DETAILED: RIGHT LATERAL TRAPEZIAL NECK

## 2019-05-30 NOTE — PROCEDURE: ADDITIONAL NOTES
Detail Level: Detailed
Additional Notes: Discussed using fraxel to lighten sun spots.  Currently using hydroquinone.

## 2019-07-05 ENCOUNTER — HOSPITAL ENCOUNTER (OUTPATIENT)
Dept: RADIOLOGY | Facility: MEDICAL CENTER | Age: 64
End: 2019-07-05
Attending: PHYSICIAN ASSISTANT
Payer: COMMERCIAL

## 2019-07-05 ENCOUNTER — OFFICE VISIT (OUTPATIENT)
Dept: URGENT CARE | Facility: MEDICAL CENTER | Age: 64
End: 2019-07-05
Payer: COMMERCIAL

## 2019-07-05 ENCOUNTER — HOSPITAL ENCOUNTER (OUTPATIENT)
Dept: RADIOLOGY | Facility: MEDICAL CENTER | Age: 64
End: 2019-07-05
Attending: INTERNAL MEDICINE
Payer: COMMERCIAL

## 2019-07-05 ENCOUNTER — HOSPITAL ENCOUNTER (OUTPATIENT)
Dept: RADIOLOGY | Facility: MEDICAL CENTER | Age: 64
End: 2019-07-05
Attending: FAMILY MEDICINE
Payer: COMMERCIAL

## 2019-07-05 VITALS
SYSTOLIC BLOOD PRESSURE: 120 MMHG | WEIGHT: 123.6 LBS | OXYGEN SATURATION: 98 % | HEART RATE: 63 BPM | BODY MASS INDEX: 21.9 KG/M2 | DIASTOLIC BLOOD PRESSURE: 62 MMHG | TEMPERATURE: 98.4 F | HEIGHT: 63 IN

## 2019-07-05 DIAGNOSIS — Z78.0 MENOPAUSE: ICD-10-CM

## 2019-07-05 DIAGNOSIS — M85.88 OSTEOPENIA OF LUMBAR SPINE: ICD-10-CM

## 2019-07-05 DIAGNOSIS — S20.212A CONTUSION OF RIB ON LEFT SIDE, INITIAL ENCOUNTER: ICD-10-CM

## 2019-07-05 DIAGNOSIS — R07.81 RIB PAIN ON LEFT SIDE: ICD-10-CM

## 2019-07-05 DIAGNOSIS — Z12.31 ENCOUNTER FOR SCREENING MAMMOGRAM FOR MALIGNANT NEOPLASM OF BREAST: ICD-10-CM

## 2019-07-05 PROCEDURE — 77080 DXA BONE DENSITY AXIAL: CPT

## 2019-07-05 PROCEDURE — 71101 X-RAY EXAM UNILAT RIBS/CHEST: CPT | Mod: LT

## 2019-07-05 PROCEDURE — 77063 BREAST TOMOSYNTHESIS BI: CPT

## 2019-07-05 PROCEDURE — 99213 OFFICE O/P EST LOW 20 MIN: CPT | Performed by: PHYSICIAN ASSISTANT

## 2019-07-05 ASSESSMENT — ENCOUNTER SYMPTOMS
FOCAL WEAKNESS: 0
JOINT SWELLING: 0
VOMITING: 0
CHILLS: 0
COUGH: 0
NAUSEA: 0
SHORTNESS OF BREATH: 0
HEMOPTYSIS: 0
FEVER: 0
WHEEZING: 0
SENSORY CHANGE: 0
WEAKNESS: 0
PALPITATIONS: 0
ARTHRALGIAS: 1
SPUTUM PRODUCTION: 0
TINGLING: 0

## 2019-07-05 NOTE — PROGRESS NOTES
Subjective:      Lidia Fitzpatrick is a 64 y.o. female who presents with Rib Injury (tripped on hike)            Rib Injury   This is a new problem. Episode onset: 1 week ago. Patient tripped while hiking and her left rib area landed on a rock. She continues to have pain in left lower rib area. The problem occurs intermittently. The problem has been waxing and waning. Associated symptoms include arthralgias (left rib area). Pertinent negatives include no chest pain, chills, coughing, fever, joint swelling, nausea, vomiting or weakness. Associated symptoms comments: No shortness of breath or difficulty breathing. Exacerbated by: certain positions- turning over in bed. Plapation of area. She has tried nothing for the symptoms.       Past Medical History:   Diagnosis Date   • Anxiety    • CATARACT     megan IOL   • Chronic thyroiditis      + US / neg ab   • Depression     mood swings   • Dyslipidemia    • Gynecological disorder    • Hypothyroidism    • Keratoconjunctivitis sicca    • multiple thyroid nodules     subcentimeter / 2016 heterogeneous okay   • Osteopenia 2005   • S/P hysterectomy with oophorectomy 2016    prolapse.   • Urinary incontinence 11/27/2017   • Urinary tract infection, site not specified        Past Surgical History:   Procedure Laterality Date   • CARPAL TUNNEL ENDOSCOPIC Right 11/29/2017    Procedure: CARPAL TUNNEL ENDOSCOPIC;  Surgeon: Raheem Ennis M.D.;  Location: [facility];  Service: Orthopedics   • HYSTERECTOMY ROBOTIC  11/21/2016    Procedure: HYSTERECTOMY ROBOTIC SUPRA CERVICAL WITH BILATERAL SALPINGO-OOPHORECTOMY;  Surgeon: Columba Estrada M.D.;  Location: [facility];  Service:    • COLPOSACROPEXY ROBOTIC  11/21/2016    Procedure: COLPOSACROPEXY ROBOTIC;  Surgeon: Columba Estrada M.D.;  Location: [facility];  Service:    • CYSTOSCOPY  11/21/2016    Procedure: CYSTOSCOPY;  Surgeon: Columba Estrada M.D.;  Location: [facility]  "[place] [facility];  Service:    • VAGINAL PERINEAL EXAM REPAIR  11/21/2016    Procedure: VAGINAL PERINEAL EXAM REPAIR FOR PERINEORRHAPHY, LEVATORPLASTY;  Surgeon: Columba Estrada M.D.;  Location: SURGERY [facility];  Service:    • BLADDER SUSPENSION     • TUBAL COAGULATION LAPAROSCOPIC BILATERAL         Family History   Problem Relation Age of Onset   • Hyperlipidemia Mother    • Hyperlipidemia Father    • Heart Disease Father         PCI   • Stroke Maternal Grandmother 87   • Cancer Paternal Grandfather         Stomach   • Cancer Maternal Aunt 62        Breast   • Cancer Paternal Uncle         digestive, unknown type       Allergies   Allergen Reactions   • Morphine Unspecified     Hallucinations  RXN=24 years ago; patient reports \"sensitive to Morphine\" not a true allergy       Medications, Allergies, and current problem list reviewed today in Epic    Review of Systems   Constitutional: Negative for chills and fever.   Respiratory: Negative for cough, hemoptysis, sputum production, shortness of breath and wheezing.    Cardiovascular: Negative for chest pain, palpitations and leg swelling.   Gastrointestinal: Negative for nausea and vomiting.   Musculoskeletal: Positive for arthralgias (left rib area). Negative for joint swelling.        Left rib pain   Neurological: Negative for tingling, sensory change, focal weakness and weakness.     All other systems reviewed and are negative.        Objective:     /62   Pulse 63   Temp 36.9 °C (98.4 °F)   Ht 1.6 m (5' 3\")   Wt 56.1 kg (123 lb 9.6 oz)   SpO2 98%   BMI 21.89 kg/m²      Physical Exam   Constitutional: She is oriented to person, place, and time. She appears well-developed and well-nourished. No distress.   HENT:   Head: Normocephalic and atraumatic.   Eyes: Conjunctivae are normal.   Cardiovascular: Normal rate, regular rhythm, normal heart sounds and intact distal pulses.  Exam reveals no gallop and no friction rub.    No murmur " heard.  Pulmonary/Chest: Effort normal and breath sounds normal. No respiratory distress. She has no decreased breath sounds. She has no wheezes. She has no rhonchi. She has no rales. She exhibits tenderness and bony tenderness. She exhibits no mass, no crepitus, no edema, no deformity, no swelling and no retraction.       Neurological: She is alert and oriented to person, place, and time. No cranial nerve deficit.   Psychiatric: She has a normal mood and affect. Her behavior is normal. Judgment and thought content normal.             7/5/2019 4:28 PM    HISTORY/REASON FOR EXAM: Anterior chest pain to the left of the sternum after fall while hiking one week ago.      TECHNIQUE/EXAM DESCRIPTION AND NUMBER OF VIEWS:  4 images of the left ribs and chest.    COMPARISON: CXR 11/8/2016    FINDINGS:  A BB marker was placed at the site of tenderness in the anterior left parasternal region inferiorly.  No displaced fractures or acute bony changes are noted.  There is no evidence of a hemo or pneumothorax.   Impression       Negative left rib series.   Reading Provider Reading Date   Luis Chery M.D. Jul 5, 2019            Assessment/Plan:     1. Contusion of rib on left side, initial encounter  [id number] (WITH 1-VIEW CXR) LEFT       - [id number] (WITH 1-VIEW CXR) LEFT; Future    X-ray negative.  Discussed with patient.  Encouraged heat, OTC Ibuprofen prn.  Deep breathing exercises.     Differential diagnoses, Supportive care, and indications for immediate follow-up discussed with patient.   Instructed to return to clinic or nearest emergency department for any change in condition, further concerns, or worsening of symptoms.    The patient demonstrated a good understanding and agreed with the treatment plan.    Bekah Hill P.A.-C.

## 2019-07-30 ENCOUNTER — TELEPHONE (OUTPATIENT)
Dept: MEDICAL GROUP | Age: 64
End: 2019-07-30

## 2020-01-21 ENCOUNTER — OFFICE VISIT (OUTPATIENT)
Dept: ENDOCRINOLOGY | Facility: MEDICAL CENTER | Age: 65
End: 2020-01-21
Payer: COMMERCIAL

## 2020-01-21 VITALS
WEIGHT: 125.4 LBS | HEART RATE: 65 BPM | BODY MASS INDEX: 22.22 KG/M2 | OXYGEN SATURATION: 98 % | HEIGHT: 63 IN | SYSTOLIC BLOOD PRESSURE: 122 MMHG | DIASTOLIC BLOOD PRESSURE: 68 MMHG

## 2020-01-21 DIAGNOSIS — R73.9 HYPERGLYCEMIA: ICD-10-CM

## 2020-01-21 DIAGNOSIS — M85.80 OSTEOPENIA, UNSPECIFIED LOCATION: ICD-10-CM

## 2020-01-21 DIAGNOSIS — E03.9 HYPOTHYROIDISM, UNSPECIFIED TYPE: ICD-10-CM

## 2020-01-21 PROCEDURE — 99213 OFFICE O/P EST LOW 20 MIN: CPT | Performed by: INTERNAL MEDICINE

## 2020-01-21 RX ORDER — LEVOTHYROXINE SODIUM 100 MCG
TABLET ORAL
Qty: 90 TAB | Refills: 1 | Status: SHIPPED | OUTPATIENT
Start: 2020-01-21 | End: 2020-07-10

## 2020-01-21 NOTE — TELEPHONE ENCOUNTER
Was the patient seen in the last year in this department? No     Does patient have an active prescription for medications requested? Yes    Received Request Via: Pharmacy    Last seen: 01/29/18    SYNTHROID 100 MCG Tab        Sig: TAKE 1 TABLET BY MOUTH  EVERY MORNING ON AN EMPTY  STOMACH

## 2020-01-22 NOTE — PROGRESS NOTES
"Chief Complaint   Patient presents with   • Hypothyroidism     ?  Chronic thyroiditis ?  Antibody negative   • Osteopenia        HPI:    See assessment and recommendations below    ROS:  All other systems reported as negative or unchanged since last exam      Allergies:   Allergies   Allergen Reactions   • Morphine Unspecified     Hallucinations  RXN=24 years ago; patient reports \"sensitive to Morphine\" not a true allergy       Current medicines including changes today:  Current Outpatient Medications   Medication Sig Dispense Refill   • Cinnamon 500 MG Cap Take  by mouth.     • multivitamin (THERAGRAN) Tab Take 1 Tab by mouth every day.     • Calcium Citrate-Vitamin D (CALCIUM + D PO) Take 1 Tab by mouth every day.     • Flaxseed, Linseed, (FLAX SEED OIL) 1000 MG Cap Take 1 Cap by mouth every day.     • Herrera Berry 550 MG Cap Take 1 Cap by mouth every day.     • Ginkgo Biloba 40 MG Tab Take 1 Tab by mouth every day.     • b complex vitamins tablet Take 1 Tab by mouth every day.     • SYNTHROID 100 MCG Tab TAKE 1 TABLET BY MOUTH  EVERY MORNING ON AN EMPTY  STOMACH 90 Tab 1   • ibuprofen (MOTRIN) 100 MG/5ML Suspension Take 10 mg/kg by mouth every 6 hours as needed.       No current facility-administered medications for this visit.         Past Medical History:   Diagnosis Date   • Anxiety    • CATARACT     megan IOL   • Chronic thyroiditis      + US / neg ab   • Depression     mood swings   • Dyslipidemia    • Gynecological disorder    • Hypothyroidism    • Keratoconjunctivitis sicca    • multiple thyroid nodules     subcentimeter / 2016 heterogeneous okay   • Osteopenia 2005   • S/P hysterectomy with oophorectomy 2016    prolapse.   • Urinary incontinence 11/27/2017   • Urinary tract infection, site not specified        PHYSICAL EXAM:    /68 (BP Location: Left arm, Patient Position: Sitting, BP Cuff Size: Adult)   Pulse 65   Ht 1.6 m (5' 2.99\")   Wt 56.9 kg (125 lb 6.4 oz)   SpO2 98%   BMI 22.22 kg/m² "     Gen.   appears healthy, good posterior    Skin   appropriate for sex and age    HEENT  unremarkable    Neck   thyroid gland about normal size and difficult to palpate deeply.  It is not enlarged    Heart  regular    Extremities  no edema    Neuro  gait and station normal    Psych  appropriate    ASSESSMENT AND RECOMMENDATIONS    1. Hypothyroidism, unspecified type              Clinically euthyroid taking levothyroxine 100 mcg daily.               Recent free T4 is normal at 1.3 and TSH 1.1.  No change indicated  - FREE THYROXINE; Future  - TSH; Future    2. Osteopenia, unspecified location             Osteopenia at the hip is approaching the osteoporosis marked.  She does everything right in terms of bone health.  Good diet and lifestyle I do not know how much vitamin D she is taking.  I think she has enough calcium in her diet.  The bone density done in July indicated stability without significant decrease at the left hip but there was a 4.5% decrease and that I think is considered significant and we should consider medication.  She has had no fractures.             She plans on doing another bone density this coming July and I think this will tell much.  If it declines any more medication will be indicated.  I would like to see her again in 6 months and we will update lab including chemistry panel and vitamin D and another bone density.  She agrees.  - VITAMIN D,25 HYDROXY; Future  - Comp Metabolic Panel; Future    3. Hyperglycemia                Fasting glucose is 105.  She is slim and trim and has an excellent diet.  A grandmother had diabetes but no other diabetics in the family.  Next time she does lab will include an A1c  - HEMOGLOBIN A1C; Future  - Comp Metabolic Panel; Future      DISPOSITION: Follow-up in 6 months      Daryl Lopez M.D.    Copies to: Deana Chao M.D. [phone]

## 2020-05-01 ENCOUNTER — HOSPITAL ENCOUNTER (OUTPATIENT)
Facility: MEDICAL CENTER | Age: 65
End: 2020-05-01
Attending: FAMILY MEDICINE
Payer: COMMERCIAL

## 2020-05-01 ENCOUNTER — OFFICE VISIT (OUTPATIENT)
Dept: MEDICAL GROUP | Age: 65
End: 2020-05-01
Payer: COMMERCIAL

## 2020-05-01 VITALS
HEIGHT: 63 IN | HEART RATE: 69 BPM | DIASTOLIC BLOOD PRESSURE: 60 MMHG | WEIGHT: 121 LBS | SYSTOLIC BLOOD PRESSURE: 124 MMHG | OXYGEN SATURATION: 96 % | BODY MASS INDEX: 21.44 KG/M2 | TEMPERATURE: 98.4 F

## 2020-05-01 DIAGNOSIS — Z11.51 SPECIAL SCREENING EXAMINATION FOR HUMAN PAPILLOMAVIRUS (HPV): ICD-10-CM

## 2020-05-01 DIAGNOSIS — N95.2 ATROPHIC VAGINITIS: ICD-10-CM

## 2020-05-01 DIAGNOSIS — R73.01 IMPAIRED FASTING BLOOD SUGAR: ICD-10-CM

## 2020-05-01 DIAGNOSIS — Z23 NEED FOR VACCINATION: ICD-10-CM

## 2020-05-01 DIAGNOSIS — Z01.419 ENCOUNTER FOR WELL WOMAN EXAM WITH ROUTINE GYNECOLOGICAL EXAM: ICD-10-CM

## 2020-05-01 DIAGNOSIS — Z01.419 WELL WOMAN EXAM: ICD-10-CM

## 2020-05-01 DIAGNOSIS — E03.9 ACQUIRED HYPOTHYROIDISM: ICD-10-CM

## 2020-05-01 DIAGNOSIS — Z12.4 ROUTINE CERVICAL SMEAR: ICD-10-CM

## 2020-05-01 PROBLEM — R73.9 HYPERGLYCEMIA: Status: RESOLVED | Noted: 2020-01-21 | Resolved: 2020-05-01

## 2020-05-01 PROCEDURE — 99396 PREV VISIT EST AGE 40-64: CPT | Mod: 25 | Performed by: FAMILY MEDICINE

## 2020-05-01 PROCEDURE — 90750 HZV VACC RECOMBINANT IM: CPT | Performed by: FAMILY MEDICINE

## 2020-05-01 PROCEDURE — 88175 CYTOPATH C/V AUTO FLUID REDO: CPT

## 2020-05-01 PROCEDURE — 87624 HPV HI-RISK TYP POOLED RSLT: CPT

## 2020-05-01 PROCEDURE — 90471 IMMUNIZATION ADMIN: CPT | Performed by: FAMILY MEDICINE

## 2020-05-01 PROCEDURE — 99213 OFFICE O/P EST LOW 20 MIN: CPT | Mod: 25 | Performed by: FAMILY MEDICINE

## 2020-05-01 ASSESSMENT — PATIENT HEALTH QUESTIONNAIRE - PHQ9: CLINICAL INTERPRETATION OF PHQ2 SCORE: 0

## 2020-05-02 NOTE — PROGRESS NOTES
Subjective:   CC: WWE      HPI:     Lidia Fitzpatrick is a 64 y.o. female, established patient of the clinic, presents with the following concerns:     , not sexually active   Contraception: s/p supracervical hysterectomy in 2017 secondary to uterine prolapse   Hx of STD: neg  Hx of abnormal pap: neg   No LMP recorded. Patient has had a hysterectomy.  Denies fever, chills, pelvic pain, dyspareunia, abnormal vaginal bleeding/discharge, genital rash.   Denies nipple bleeding, discharge, breast mass, familial/personal hx of breast/gyn malignancy.   Last mammogram: 2019, Finding: normal      Blood tests done in 2019 showed impaired fasting blood sugar.   She has hx of atrophic vaginitis, but does not require med. She continues to do well and declined any treatment today.     Current medicines (including changes today)  Current Outpatient Medications   Medication Sig Dispense Refill   • SYNTHROID 100 MCG Tab TAKE 1 TABLET BY MOUTH  EVERY MORNING ON AN EMPTY  STOMACH 90 Tab 1   • Cinnamon 500 MG Cap Take  by mouth.     • multivitamin (THERAGRAN) Tab Take 1 Tab by mouth every day.     • Calcium Citrate-Vitamin D (CALCIUM + D PO) Take 1 Tab by mouth every day.     • Flaxseed, Linseed, (FLAX SEED OIL) 1000 MG Cap Take 1 Cap by mouth every day.     • [place] Berry 550 MG Cap Take 1 Cap by mouth every day.     • Ginkgo Biloba 40 MG Tab Take 1 Tab by mouth every day.     • b complex vitamins tablet Take 1 Tab by mouth every day.       No current facility-administered medications for this visit.      She  has a past medical history of Anxiety, CATARACT, Chronic thyroiditis, Depression, Dyslipidemia, Gynecological disorder, Hypothyroidism, Keratoconjunctivitis sicca, multiple thyroid nodules, Osteopenia (), S/P hysterectomy with oophorectomy (), Urinary incontinence (2017), and Urinary tract infection, site not specified. She also has no past medical history of Addisons disease (HCC), Adrenal disorder,  "Allergy, Anemia, Arthritis, Blood transfusion, Clotting disorder, COPD, Cushings syndrome, GERD (gastroesophageal reflux disease), Goiter, Headache(784.0), HIV (human immunodeficiency virus infection), IBD (inflammatory bowel disease), Meningitis, Migraine, Muscle disorder, Parathyroid disorder (HCC), Pituitary disease (HCC), Substance abuse (HCC), or Ulcer.    I personally reviewed patient's problem list, allergies, medications, family hx, social hx with patient and update EPIC.     REVIEW OF SYSTEMS:  CONSTITUTIONAL:  Denies night sweats, fatigue, malaise, lethargy, fever or chills.  RESPIRATORY:  Denies cough, wheeze, hemoptysis, or shortness of breath.  CARDIOVASCULAR:  Denies chest pains, palpitations, pedal edema     Objective:     /60 (BP Location: Right arm, Patient Position: Sitting, BP Cuff Size: Adult)   Pulse 69   Temp 36.9 °C (98.4 °F) (Temporal)   Ht 1.6 m (5' 3\")   Wt 54.9 kg (121 lb)   SpO2 96%  Body mass index is 21.43 kg/m².    Physical Exam:  Constitutional: awake, alert, in no distress.  Skin: Warm, dry, good turgor, no rashes, bruises, ulcers in visible areas.  Neck: Trachea midline, no masses, no thyromegaly. No cervical or supraclavicular lymphadenopathy  Respiratory: Unlabored respiratory effort, lungs clear to auscultation, no wheezes, no rales.  Cardiovascular: Normal S1, S2, no murmur, no pedal edema.  Psych: Oriented x3, affect and mood wnl, intact judgement and insight.   GYN: Unremarkable external genitalia. Negative abnormal vaginal discharge or bleeding, no vaginal rash, cervix in mid position, no concerning lesions on cervix, no cervical motion tenderness, uterus surgically absent, no adnexal fullness/mass appreciated on bimanual exam.     Assessment and Plan:   The following treatment plan was discussed    1. Impaired fasting blood sugar  - Pt was counseled on dietary modification, weight loss   - Recommended moderate intensity exercise at least 30 minutes per day x 5 " days per week.  - Follow up in 6 months.     2. Acquired hypothyroidism  Chronic, controlled with Synthroid 100 mcg daily, no side effect reported.  This condition is currently being managed by Dr. Lopez.  Her last thyroid function test in November 2019 was normal.  -Continue levothyroxine 100 mcg daily  -Follow-up with Dr. Lopez as directed    3. Encounter for well woman exam with routine gynecological exam  - THINPREP PAP WITH HPV    4. Routine cervical smear  - THINPREP PAP WITH HPV    5. Special screening examination for human papillomavirus (HPV)  - THINPREP PAP WITH HPV    6. Need for vaccination  - Shingles Vaccine (SHINGRIX)    7. Atrophic vaginitis  Asymptomatic, does not require treatment, will continue to monitor.    8. Well woman exam  General counseling provided, topics might include: diet, exercise, vitamin supplement, mental health, sleep, stress management, pap, mammogram, colonoscopy and vaccine recommendations.           Deana Chao M.D.      Followup: Return in about 1 year (around 5/1/2021) for annual PE.    Please note that this dictation was created using voice recognition software. I have made every reasonable attempt to correct obvious errors, but I expect that there are errors of grammar and possibly content that I did not discover before finalizing the note.

## 2020-05-05 LAB
CYTOLOGY REG CYTOL: NORMAL
HPV HR 12 DNA CVX QL NAA+PROBE: NEGATIVE
HPV16 DNA SPEC QL NAA+PROBE: NEGATIVE
HPV18 DNA SPEC QL NAA+PROBE: NEGATIVE
SPECIMEN SOURCE: NORMAL

## 2020-06-05 ENCOUNTER — TELEPHONE (OUTPATIENT)
Dept: ENDOCRINOLOGY | Facility: MEDICAL CENTER | Age: 65
End: 2020-06-05

## 2020-06-05 ENCOUNTER — APPOINTMENT (RX ONLY)
Dept: URBAN - METROPOLITAN AREA CLINIC 35 | Facility: CLINIC | Age: 65
Setting detail: DERMATOLOGY
End: 2020-06-05

## 2020-06-05 DIAGNOSIS — D22 MELANOCYTIC NEVI: ICD-10-CM

## 2020-06-05 DIAGNOSIS — L81.4 OTHER MELANIN HYPERPIGMENTATION: ICD-10-CM

## 2020-06-05 DIAGNOSIS — L44.8 OTHER SPECIFIED PAPULOSQUAMOUS DISORDERS: ICD-10-CM

## 2020-06-05 DIAGNOSIS — Z71.89 OTHER SPECIFIED COUNSELING: ICD-10-CM

## 2020-06-05 DIAGNOSIS — L82.1 OTHER SEBORRHEIC KERATOSIS: ICD-10-CM

## 2020-06-05 PROBLEM — D22.71 MELANOCYTIC NEVI OF RIGHT LOWER LIMB, INCLUDING HIP: Status: ACTIVE | Noted: 2020-06-05

## 2020-06-05 PROBLEM — D22.72 MELANOCYTIC NEVI OF LEFT LOWER LIMB, INCLUDING HIP: Status: ACTIVE | Noted: 2020-06-05

## 2020-06-05 PROBLEM — D22.5 MELANOCYTIC NEVI OF TRUNK: Status: ACTIVE | Noted: 2020-06-05

## 2020-06-05 PROCEDURE — 99213 OFFICE O/P EST LOW 20 MIN: CPT

## 2020-06-05 PROCEDURE — ? COUNSELING

## 2020-06-05 ASSESSMENT — LOCATION SIMPLE DESCRIPTION DERM
LOCATION SIMPLE: LEFT PRETIBIAL REGION
LOCATION SIMPLE: LEFT POSTERIOR THIGH
LOCATION SIMPLE: RIGHT FOREARM
LOCATION SIMPLE: LEFT UPPER ARM
LOCATION SIMPLE: LEFT FOREARM
LOCATION SIMPLE: UPPER BACK
LOCATION SIMPLE: RIGHT PRETIBIAL REGION
LOCATION SIMPLE: LEFT SHOULDER
LOCATION SIMPLE: RIGHT LOWER BACK
LOCATION SIMPLE: LEFT CALF
LOCATION SIMPLE: RIGHT SHOULDER
LOCATION SIMPLE: RIGHT CALF
LOCATION SIMPLE: ABDOMEN

## 2020-06-05 ASSESSMENT — LOCATION DETAILED DESCRIPTION DERM
LOCATION DETAILED: LEFT PROXIMAL DORSAL FOREARM
LOCATION DETAILED: RIGHT DISTAL PRETIBIAL REGION
LOCATION DETAILED: RIGHT PROXIMAL CALF
LOCATION DETAILED: RIGHT DISTAL CALF
LOCATION DETAILED: LEFT POSTERIOR SHOULDER
LOCATION DETAILED: RIGHT PROXIMAL DORSAL FOREARM
LOCATION DETAILED: RIGHT SUPERIOR MEDIAL MIDBACK
LOCATION DETAILED: RIGHT POSTERIOR SHOULDER
LOCATION DETAILED: EPIGASTRIC SKIN
LOCATION DETAILED: LEFT DISTAL CALF
LOCATION DETAILED: LEFT DISTAL LATERAL POSTERIOR THIGH
LOCATION DETAILED: LEFT DISTAL PRETIBIAL REGION
LOCATION DETAILED: LEFT ANTECUBITAL SKIN
LOCATION DETAILED: INFERIOR THORACIC SPINE

## 2020-06-05 ASSESSMENT — LOCATION ZONE DERM
LOCATION ZONE: TRUNK
LOCATION ZONE: LEG
LOCATION ZONE: ARM

## 2020-06-05 NOTE — TELEPHONE ENCOUNTER
1. Caller Name: Stephanie                        Call Back Number: [phone] (home)         How would the patient prefer to be contacted with a response: Phone call OK to leave a detailed message    Patient called asking for you to place the order for her to get a bone density scan. her last one was done last year but she has commercial insurance so they will probably approve it

## 2020-06-07 DIAGNOSIS — M85.80 OSTEOPENIA, UNSPECIFIED LOCATION: ICD-10-CM

## 2020-06-19 ENCOUNTER — PATIENT MESSAGE (OUTPATIENT)
Dept: ENDOCRINOLOGY | Facility: MEDICAL CENTER | Age: 65
End: 2020-06-19

## 2020-07-09 DIAGNOSIS — E06.3 HYPOTHYROIDISM, ACQUIRED, AUTOIMMUNE: ICD-10-CM

## 2020-07-10 RX ORDER — LEVOTHYROXINE SODIUM 100 MCG
TABLET ORAL
Qty: 90 TAB | Refills: 1 | Status: SHIPPED | OUTPATIENT
Start: 2020-07-10 | End: 2020-12-22 | Stop reason: SDUPTHER

## 2020-07-10 NOTE — TELEPHONE ENCOUNTER
Received request via: Patient    Was the patient seen in the last year in this department? Yes    Does the patient have an active prescription (recently filled or refills available) for medication(s) requested? No     SYNTHROID 100 MCG Tab

## 2020-12-22 DIAGNOSIS — E06.3 HYPOTHYROIDISM, ACQUIRED, AUTOIMMUNE: ICD-10-CM

## 2020-12-22 RX ORDER — LEVOTHYROXINE SODIUM 100 MCG
100 TABLET ORAL EVERY MORNING
Qty: 90 TAB | Refills: 1 | Status: SHIPPED | OUTPATIENT
Start: 2020-12-22 | End: 2021-08-30

## 2021-03-22 ENCOUNTER — TELEPHONE (OUTPATIENT)
Dept: ENDOCRINOLOGY | Facility: MEDICAL CENTER | Age: 66
End: 2021-03-22

## 2021-03-25 DIAGNOSIS — M85.80 OSTEOPENIA, UNSPECIFIED LOCATION: ICD-10-CM

## 2021-03-25 DIAGNOSIS — E03.9 ACQUIRED HYPOTHYROIDISM: ICD-10-CM

## 2021-03-25 DIAGNOSIS — R73.9 HYPERGLYCEMIA: ICD-10-CM

## 2021-04-22 DIAGNOSIS — E03.9 ACQUIRED HYPOTHYROIDISM: ICD-10-CM

## 2021-04-22 RX ORDER — LEVOTHYROXINE SODIUM 100 MCG
100 TABLET ORAL
Qty: 30 TABLET | Refills: 5 | Status: SHIPPED | OUTPATIENT
Start: 2021-04-22 | End: 2021-05-18

## 2021-04-27 ENCOUNTER — TELEPHONE (OUTPATIENT)
Dept: MEDICAL GROUP | Age: 66
End: 2021-04-27

## 2021-04-27 NOTE — TELEPHONE ENCOUNTER
VOICEMAIL  1. Caller Name: Mike (pt son)                      Call Back Number: [phone]     2. Message: pt son is concerned with pts mental health. Would like a call to discuss. Son not on pts release of information.    3. Patient approves office to leave a detailed voicemail/MyChart message: no

## 2021-04-28 NOTE — TELEPHONE ENCOUNTER
Phone Number Called: [phone] / [phone]    Call outcome: Spoke to patient regarding message below.    Message: spoke to patient abbey leon about Dr. Walter suggestion to schedule an appointment to address all concerns they have. He had informed me that they are scheduling their mother an appointment to see psych first. I advised that if they still feel they need to discuss this with Dr. Chao after the psych appointment that they can schedule and all come in to discuss the concerns at hand. Called and LVM for abbey ortiz about the suggestion that Dr. Chao has given.

## 2021-04-28 NOTE — TELEPHONE ENCOUNTER
Please schedule appointment to discuss mental health concerns. Family members can be presents during the visit if patient consents.   Deana Chao M.D.

## 2021-05-05 ENCOUNTER — OFFICE VISIT (OUTPATIENT)
Dept: ENDOCRINOLOGY | Facility: MEDICAL CENTER | Age: 66
End: 2021-05-05
Attending: INTERNAL MEDICINE
Payer: COMMERCIAL

## 2021-05-05 VITALS
OXYGEN SATURATION: 97 % | SYSTOLIC BLOOD PRESSURE: 124 MMHG | BODY MASS INDEX: 20.94 KG/M2 | HEART RATE: 64 BPM | WEIGHT: 118.2 LBS | DIASTOLIC BLOOD PRESSURE: 70 MMHG | HEIGHT: 63 IN

## 2021-05-05 DIAGNOSIS — E06.5 THYROIDITIS, CHRONIC: ICD-10-CM

## 2021-05-05 DIAGNOSIS — M85.80 OSTEOPENIA, UNSPECIFIED LOCATION: ICD-10-CM

## 2021-05-05 DIAGNOSIS — E03.9 ACQUIRED HYPOTHYROIDISM: ICD-10-CM

## 2021-05-05 DIAGNOSIS — R73.9 HYPERGLYCEMIA: ICD-10-CM

## 2021-05-05 PROCEDURE — 99214 OFFICE O/P EST MOD 30 MIN: CPT | Performed by: INTERNAL MEDICINE

## 2021-05-05 PROCEDURE — 99211 OFF/OP EST MAY X REQ PHY/QHP: CPT | Performed by: INTERNAL MEDICINE

## 2021-05-05 NOTE — PROGRESS NOTES
"Chief Complaint   Patient presents with   • Hypothyroidism   • Osteopenia   • Hyperglycemia        HPI:    Patient is feeling very well.  She is retired and has an active lifestyle.  She does yoga every morning and frequently takes hikes during the day.  Works out in a gym.  She feels fine with no complaints.    See assessment and recommendations below    ROS:  All other systems reported as negative or unchanged since last exam      Allergies:   Allergies   Allergen Reactions   • Morphine Unspecified     Hallucinations  RXN=24 years ago; patient reports \"sensitive to Morphine\" not a true allergy       Current medicines including changes today:  Current Outpatient Medications   Medication Sig Dispense Refill   • SYNTHROID 100 MCG Tab Take 1 tablet by mouth every morning on an empty stomach. 30 tablet 5   • Cinnamon 500 MG Cap Take  by mouth.     • multivitamin (THERAGRAN) Tab Take 1 Tab by mouth every day.     • Calcium Citrate-Vitamin D (CALCIUM + D PO) Take 1 Tab by mouth every day.     • Flaxseed, Linseed, (FLAX SEED OIL) 1000 MG Cap Take 1 Cap by mouth every day.     • [place] Berry 550 MG Cap Take 1 Cap by mouth every day.     • Ginkgo Biloba 40 MG Tab Take 1 Tab by mouth every day.     • b complex vitamins tablet Take 1 Tab by mouth every day.     • SYNTHROID 100 MCG Tab Take 1 Tab by mouth every morning. ON A EMPTY STOMACH 90 Tab 1     No current facility-administered medications for this visit.        Past Medical History:   Diagnosis Date   • Anxiety    • CATARACT     megan IOL   • Chronic thyroiditis      + US / neg ab   • Depression     mood swings   • Dyslipidemia    • Gynecological disorder    • Hypothyroidism    • Keratoconjunctivitis sicca    • multiple thyroid nodules     subcentimeter / 2016 heterogeneous okay   • Osteopenia 2005   • S/P hysterectomy with oophorectomy 2016    prolapse.   • Urinary incontinence 11/27/2017   • Urinary tract infection, site not specified        PHYSICAL EXAM:    BP " "124/70 (BP Location: Left arm, Patient Position: Sitting, BP Cuff Size: Adult)   Pulse 64   Ht 1.6 m (5' 3\")   Wt 53.6 kg (118 lb 3.2 oz)   SpO2 97%   BMI 20.94 kg/m²     Gen.   appears healthy, good posture    Skin   appropriate for sex and age    HEENT  unremarkable    Neck   thyroid gland is difficult to palpate.  I think it has become atrophic or somewhat substernal.    Heart  regular    Extremities  no edema    Neuro  gait and station normal    Psych  appropriate calm, pleasant    ASSESSMENT AND RECOMMENDATIONS    1. Acquired hypothyroidism          Clinically euthyroid taking brand Synthroid 100 mcg/day.           We will update lab and discussed by MyChart or phone    2. Thyroiditis, chronic            Thyroid gland is asymptomatic. Patient is not aware of any change in her gland. No discomfort. No difficulty swallowing, breathing, or voice change.         Thyroid ultrasound in the past described as being heterogeneous which would be consistent with thyroiditis but her antibodies have been negative in the past                 3. Osteopenia, unspecified location             Update bone density and further discuss    4. Hyperglycemia          No symptoms of diabetes and no family history of diabetes.  Chemistry panel done last November as a fasting glucose 108                We will update A1c and other lab now and discuss by MyChart or phone       DISPOSITION: She goes to the [facility] health fair and has additional orders from me.  So hopefully the results will come to our office.      Daryl Lopez M.D.    Copies to: Deana Chao M.D. [phone]  "

## 2021-05-12 ENCOUNTER — HOSPITAL ENCOUNTER (OUTPATIENT)
Dept: LAB | Facility: MEDICAL CENTER | Age: 66
End: 2021-05-12
Attending: INTERNAL MEDICINE
Payer: COMMERCIAL

## 2021-05-12 DIAGNOSIS — M85.80 OSTEOPENIA, UNSPECIFIED LOCATION: ICD-10-CM

## 2021-05-12 DIAGNOSIS — R73.9 HYPERGLYCEMIA: ICD-10-CM

## 2021-05-12 DIAGNOSIS — E03.9 ACQUIRED HYPOTHYROIDISM: ICD-10-CM

## 2021-05-12 LAB
EST. AVERAGE GLUCOSE BLD GHB EST-MCNC: 117 MG/DL
HBA1C MFR BLD: 5.7 % (ref 4–5.6)
T4 FREE SERPL-MCNC: 1.46 NG/DL (ref 0.93–1.7)
TSH SERPL DL<=0.005 MIU/L-ACNC: 2.4 UIU/ML (ref 0.38–5.33)

## 2021-05-12 PROCEDURE — 84439 ASSAY OF FREE THYROXINE: CPT

## 2021-05-12 PROCEDURE — 36415 COLL VENOUS BLD VENIPUNCTURE: CPT

## 2021-05-12 PROCEDURE — 83036 HEMOGLOBIN GLYCOSYLATED A1C: CPT

## 2021-05-12 PROCEDURE — 82306 VITAMIN D 25 HYDROXY: CPT

## 2021-05-12 PROCEDURE — 84443 ASSAY THYROID STIM HORMONE: CPT

## 2021-05-14 LAB — 25(OH)D3 SERPL-MCNC: 56 NG/ML (ref 30–80)

## 2021-05-18 ENCOUNTER — OFFICE VISIT (OUTPATIENT)
Dept: MEDICAL GROUP | Age: 66
End: 2021-05-18
Payer: COMMERCIAL

## 2021-05-18 VITALS
HEIGHT: 63 IN | TEMPERATURE: 98.4 F | WEIGHT: 115 LBS | SYSTOLIC BLOOD PRESSURE: 114 MMHG | BODY MASS INDEX: 20.38 KG/M2 | HEART RATE: 65 BPM | OXYGEN SATURATION: 98 % | DIASTOLIC BLOOD PRESSURE: 58 MMHG

## 2021-05-18 DIAGNOSIS — Z78.0 MENOPAUSE: ICD-10-CM

## 2021-05-18 DIAGNOSIS — Z11.59 NEED FOR HEPATITIS C SCREENING TEST: ICD-10-CM

## 2021-05-18 DIAGNOSIS — Z12.31 ENCOUNTER FOR SCREENING MAMMOGRAM FOR BREAST CANCER: ICD-10-CM

## 2021-05-18 DIAGNOSIS — E03.9 ACQUIRED HYPOTHYROIDISM: ICD-10-CM

## 2021-05-18 DIAGNOSIS — N39.45 CONTINUOUS LEAKAGE OF URINE: ICD-10-CM

## 2021-05-18 DIAGNOSIS — M85.80 OSTEOPENIA, UNSPECIFIED LOCATION: ICD-10-CM

## 2021-05-18 DIAGNOSIS — R41.89 COGNITIVE CHANGE: ICD-10-CM

## 2021-05-18 DIAGNOSIS — R73.01 IMPAIRED FASTING BLOOD SUGAR: Primary | ICD-10-CM

## 2021-05-18 DIAGNOSIS — E78.5 DYSLIPIDEMIA: Chronic | ICD-10-CM

## 2021-05-18 PROBLEM — R73.9 HYPERGLYCEMIA: Status: RESOLVED | Noted: 2020-01-21 | Resolved: 2021-05-18

## 2021-05-18 PROCEDURE — 99396 PREV VISIT EST AGE 40-64: CPT | Performed by: FAMILY MEDICINE

## 2021-05-18 ASSESSMENT — PATIENT HEALTH QUESTIONNAIRE - PHQ9: CLINICAL INTERPRETATION OF PHQ2 SCORE: 0

## 2021-05-19 NOTE — PROGRESS NOTES
Subjective:   CC: cognitive changes     HPI:     Lidia Fitzpatrick is a 65 y.o. female, established patient of the clinic, presents with the following concerns:     Patient has chronic impaired fasting blood sugar, hypothyroidism, hyperlipidemia, osteopenia.  Patient is taking Synthroid 100 mcg daily.  Her most recent labs showed A1c of 5.7.  Thyroid function test is normal.  Patient is physically active.  She exercises 7 days/week.    She is taking calcium and vitamin D for osteopenia.  Negative history of fall or bone fracture.    She does not take medication for hyperlipidemia.  She is working on diet and lifestyle modification to improve this condition.    Patient has been suffering incontinent for quite some time.  She has continuous leakage of urine.  She was not interested in treatment in the past.  However, she complains of worsening of symptoms and would like to be referred to urogynecologist for consultation.  Symptoms does reduce quality of life and limit her physical activity.    Patient has 3 sons.  She recently visited one of her son who lives in [place].  Her sons have some concerned regarding patient cognitive changes.  However, she is not able to tell me what their concerns were.  Patient herself does not perceive that she has this problem.  She states that she is feeling well.  Her memory is normal.  She does not have any issue with her mental health.  She states that her  mentioned that it takes too long for her to answer questions, even simple ones.  She states that she had to be very careful answering his question as he is very critical of her.  Therefore, she wants to take time to think about her answers.  She denies depression, anxiety, personal/familial history of dementia or any neurodegenerative diseases.    Current medicines (including changes today)  Current Outpatient Medications   Medication Sig Dispense Refill   • SYNTHROID 100 MCG Tab Take 1 Tab by mouth every morning. ON A  "EMPTY STOMACH 90 Tab 1   • Cinnamon 500 MG Cap Take  by mouth.     • multivitamin (THERAGRAN) Tab Take 1 Tab by mouth every day.     • Calcium Citrate-Vitamin D (CALCIUM + D PO) Take 1 Tab by mouth every day.     • Flaxseed, Linseed, (FLAX SEED OIL) 1000 MG Cap Take 1 Cap by mouth every day.     • Herrera Berry 550 MG Cap Take 1 Cap by mouth every day.     • Ginkgo Biloba 40 MG Tab Take 1 Tab by mouth every day.     • b complex vitamins tablet Take 1 Tab by mouth every day.       No current facility-administered medications for this visit.     She  has a past medical history of Anxiety, CATARACT, Chronic thyroiditis, Depression, Dyslipidemia, Gynecological disorder, Hypothyroidism, Keratoconjunctivitis sicca, multiple thyroid nodules, Osteopenia (2005), S/P hysterectomy with oophorectomy (2016), Urinary incontinence (11/27/2017), and Urinary tract infection, site not specified. She also has no past medical history of Addisons disease (HCC), Adrenal disorder, Allergy, Anemia, Arthritis, Blood transfusion, Clotting disorder, COPD, Cushings syndrome, GERD (gastroesophageal reflux disease), Goiter, Headache(784.0), HIV (human immunodeficiency virus infection), IBD (inflammatory bowel disease), Meningitis, Migraine, Muscle disorder, Parathyroid disorder (HCC), Pituitary disease ([facility]), Substance abuse ([facility]), or Ulcer.    I personally reviewed patient's problem list, allergies, medications, family hx, social hx with patient and update [facility].     REVIEW OF SYSTEMS:  CONSTITUTIONAL:  Denies night sweats, fatigue, malaise, lethargy, fever or chills.  RESPIRATORY:  Denies cough, wheeze, hemoptysis, or shortness of breath.  CARDIOVASCULAR:  Denies chest pains, palpitations, pedal edema     Objective:     /58 (BP Location: Right arm, Patient Position: Sitting, BP Cuff Size: Adult)   Pulse 65   Temp 36.9 °C (98.4 °F) (Temporal)   Ht 1.6 m (5' 3\")   Wt 52.2 kg (115 lb)   SpO2 98%  Body mass index is 20.37 " kg/m².    Physical Exam:  Constitutional: awake, alert, in no distress.  Skin: Warm, dry, good turgor, no rashes, bruises, ulcers in visible areas.  Eye: conjunctiva clear, lids neg for edema or lesions.  ENMT: TM and auditory canals wnl.    Neck: Trachea midline, no masses, no thyromegaly. No cervical or supraclavicular lymphadenopathy  Respiratory: Unlabored respiratory effort, lungs clear to auscultation, no wheezes, no rales.  Cardiovascular: Normal S1, S2, no murmur, no pedal edema.  Neuro: CN2-12 grossly intact. Strength 5/5, reflexes 2/4 in all extremities, no sensory deficits.   Psych: Oriented x3, affect and mood wnl, intact judgement and insight.   - flat affect, poor face expression  - take 2-3 minutes to answer questions   - unable to maintain conversation.       Assessment and Plan:   The following treatment plan was discussed    1. Impaired fasting blood sugar  Most recent A1C was 5.7  Exercises 7 days per week.   BMI 20.37.   - Dietary/lifestyle modification  - HEMOGLOBIN A1C; Future    2. Acquired hypothyroidism  Chronic, controlled with Levothyroxine 100 mcg qd, normal TFT in 5/2021, no s/e reported, will continue.      3. Dyslipidemia  Mild, diet controlled, will monitor.   - CBC WITH DIFFERENTIAL; Future  - Comp Metabolic Panel; Future  - Lipid Profile; Future    4. Osteopenia, unspecified location  - Calcium and Vitamin D  - Weight-bearing aerobic and strengthening exercises can decrease risk of falls and fractures by improving muscle strength, coordination, balance, and mobility  - Limit caffeine intake to 2 or fewer servings per day  - Limit alcohol consumption to one drink per day  - pt was counseled on fall risk prevention     - DS-BONE DENSITY STUDY (DEXA); Future    5. Menopause  - DS-BONE DENSITY STUDY (DEXA); Future    6. Continuous leakage of urine  - REFERRAL TO UROGYNECOLOGY    7. Cognitive change  Family (sons and ) have concerns about her cognitive changes. She is not able to  voice there concerns. Family members are not present during the visit. With her permission, I talked to Eldon (her son) by phone after the visit. Eldon is concerned of possible depression. She appears to be distant and lack of emotion and warmth toward family members. She also appears to have cognitive slowing. This is acute change from baseline. Eldon said that pt has appointment with Psych on 6/1/2021 for evaluation. She will follow up with me following psych eval.     8. Encounter for screening mammogram for breast cancer  - MA-SCREENING MAMMO BILAT W/CAD; Future    9. Need for hepatitis C screening test  - HEP C VIRUS ANTIBODY; Future     Total time spent reviewing pt's chart, labs, notes, imaging, and counseling/prescribing medications to patient before, during, and after the visit: 40 minutes.      Deana Chao M.D.      Followup: Return in about 1 year (around 5/18/2022) for annual PE.    Please note that this dictation was created using voice recognition software. I have made every reasonable attempt to correct obvious errors, but I expect that there are errors of grammar and possibly content that I did not discover before finalizing the note.

## 2021-06-07 ENCOUNTER — APPOINTMENT (RX ONLY)
Dept: URBAN - METROPOLITAN AREA CLINIC 35 | Facility: CLINIC | Age: 66
Setting detail: DERMATOLOGY
End: 2021-06-07

## 2021-06-07 DIAGNOSIS — L82.1 OTHER SEBORRHEIC KERATOSIS: ICD-10-CM

## 2021-06-07 DIAGNOSIS — L72.0 EPIDERMAL CYST: ICD-10-CM

## 2021-06-07 DIAGNOSIS — D22 MELANOCYTIC NEVI: ICD-10-CM

## 2021-06-07 DIAGNOSIS — Z71.89 OTHER SPECIFIED COUNSELING: ICD-10-CM

## 2021-06-07 DIAGNOSIS — L81.4 OTHER MELANIN HYPERPIGMENTATION: ICD-10-CM

## 2021-06-07 PROBLEM — D22.5 MELANOCYTIC NEVI OF TRUNK: Status: ACTIVE | Noted: 2021-06-07

## 2021-06-07 PROBLEM — D22.71 MELANOCYTIC NEVI OF RIGHT LOWER LIMB, INCLUDING HIP: Status: ACTIVE | Noted: 2021-06-07

## 2021-06-07 PROBLEM — D22.72 MELANOCYTIC NEVI OF LEFT LOWER LIMB, INCLUDING HIP: Status: ACTIVE | Noted: 2021-06-07

## 2021-06-07 PROCEDURE — 99213 OFFICE O/P EST LOW 20 MIN: CPT

## 2021-06-07 PROCEDURE — ? COUNSELING

## 2021-06-07 ASSESSMENT — LOCATION SIMPLE DESCRIPTION DERM
LOCATION SIMPLE: LEFT CALF
LOCATION SIMPLE: LEFT FOREARM
LOCATION SIMPLE: RIGHT CALF
LOCATION SIMPLE: RIGHT LOWER BACK
LOCATION SIMPLE: RIGHT SHOULDER
LOCATION SIMPLE: RIGHT PRETIBIAL REGION
LOCATION SIMPLE: LEFT POSTERIOR THIGH
LOCATION SIMPLE: UPPER BACK
LOCATION SIMPLE: ABDOMEN
LOCATION SIMPLE: LEFT SHOULDER
LOCATION SIMPLE: LEFT PRETIBIAL REGION
LOCATION SIMPLE: RIGHT CHEEK
LOCATION SIMPLE: RIGHT FOREARM

## 2021-06-07 ASSESSMENT — LOCATION DETAILED DESCRIPTION DERM
LOCATION DETAILED: LEFT DISTAL LATERAL POSTERIOR THIGH
LOCATION DETAILED: RIGHT DISTAL PRETIBIAL REGION
LOCATION DETAILED: LEFT DISTAL PRETIBIAL REGION
LOCATION DETAILED: RIGHT PROXIMAL CALF
LOCATION DETAILED: LEFT DISTAL CALF
LOCATION DETAILED: LEFT PROXIMAL DORSAL FOREARM
LOCATION DETAILED: EPIGASTRIC SKIN
LOCATION DETAILED: LEFT POSTERIOR SHOULDER
LOCATION DETAILED: RIGHT PROXIMAL DORSAL FOREARM
LOCATION DETAILED: RIGHT DISTAL CALF
LOCATION DETAILED: RIGHT SUPERIOR MEDIAL MIDBACK
LOCATION DETAILED: RIGHT POSTERIOR SHOULDER
LOCATION DETAILED: RIGHT MEDIAL BUCCAL CHEEK
LOCATION DETAILED: INFERIOR THORACIC SPINE

## 2021-06-07 ASSESSMENT — LOCATION ZONE DERM
LOCATION ZONE: ARM
LOCATION ZONE: TRUNK
LOCATION ZONE: FACE
LOCATION ZONE: LEG

## 2021-07-20 ENCOUNTER — HOSPITAL ENCOUNTER (OUTPATIENT)
Dept: RADIOLOGY | Facility: MEDICAL CENTER | Age: 66
End: 2021-07-20
Attending: FAMILY MEDICINE
Payer: MEDICARE

## 2021-07-20 DIAGNOSIS — M85.80 OSTEOPENIA, UNSPECIFIED LOCATION: ICD-10-CM

## 2021-07-20 DIAGNOSIS — Z12.31 ENCOUNTER FOR SCREENING MAMMOGRAM FOR BREAST CANCER: ICD-10-CM

## 2021-07-20 DIAGNOSIS — Z78.0 MENOPAUSE: ICD-10-CM

## 2021-07-20 PROCEDURE — 77080 DXA BONE DENSITY AXIAL: CPT

## 2021-07-20 PROCEDURE — 77063 BREAST TOMOSYNTHESIS BI: CPT

## 2021-07-23 ENCOUNTER — TELEPHONE (OUTPATIENT)
Dept: MEDICAL GROUP | Age: 66
End: 2021-07-23

## 2021-07-23 DIAGNOSIS — M81.0 AGE-RELATED OSTEOPOROSIS WITHOUT CURRENT PATHOLOGICAL FRACTURE: ICD-10-CM

## 2021-07-23 NOTE — TELEPHONE ENCOUNTER
----- Message from Deana Chao M.D. sent at 7/23/2021 12:26 PM PDT -----  Please schedule appointment to discuss treatment for osteoporosis.   Deana Chao M.D.

## 2021-07-23 NOTE — TELEPHONE ENCOUNTER
Phone Number Called: [phone] (home)       Call outcome: Did not leave a detailed message. Requested patient to call back.    Message: [facility] 1st attempt

## 2021-07-26 ENCOUNTER — PATIENT MESSAGE (OUTPATIENT)
Dept: MEDICAL GROUP | Age: 66
End: 2021-07-26

## 2021-07-26 DIAGNOSIS — M81.0 AGE-RELATED OSTEOPOROSIS WITHOUT CURRENT PATHOLOGICAL FRACTURE: ICD-10-CM

## 2021-08-02 ENCOUNTER — HOSPITAL ENCOUNTER (OUTPATIENT)
Dept: LAB | Facility: MEDICAL CENTER | Age: 66
End: 2021-08-02
Attending: FAMILY MEDICINE
Payer: MEDICARE

## 2021-08-02 DIAGNOSIS — R73.01 IMPAIRED FASTING BLOOD SUGAR: ICD-10-CM

## 2021-08-02 DIAGNOSIS — Z11.59 NEED FOR HEPATITIS C SCREENING TEST: ICD-10-CM

## 2021-08-02 DIAGNOSIS — E78.5 DYSLIPIDEMIA: Chronic | ICD-10-CM

## 2021-08-02 LAB
ALBUMIN SERPL BCP-MCNC: 4.1 G/DL (ref 3.2–4.9)
ALBUMIN/GLOB SERPL: 1.3 G/DL
ALP SERPL-CCNC: 89 U/L (ref 30–99)
ALT SERPL-CCNC: 12 U/L (ref 2–50)
ANION GAP SERPL CALC-SCNC: 10 MMOL/L (ref 7–16)
AST SERPL-CCNC: 19 U/L (ref 12–45)
BASOPHILS # BLD AUTO: 0.9 % (ref 0–1.8)
BASOPHILS # BLD: 0.04 K/UL (ref 0–0.12)
BILIRUB SERPL-MCNC: 0.8 MG/DL (ref 0.1–1.5)
BUN SERPL-MCNC: 21 MG/DL (ref 8–22)
CALCIUM SERPL-MCNC: 9.3 MG/DL (ref 8.4–10.2)
CHLORIDE SERPL-SCNC: 105 MMOL/L (ref 96–112)
CHOLEST SERPL-MCNC: 154 MG/DL (ref 100–199)
CO2 SERPL-SCNC: 25 MMOL/L (ref 20–33)
CREAT SERPL-MCNC: 0.86 MG/DL (ref 0.5–1.4)
EOSINOPHIL # BLD AUTO: 0.04 K/UL (ref 0–0.51)
EOSINOPHIL NFR BLD: 0.9 % (ref 0–6.9)
ERYTHROCYTE [DISTWIDTH] IN BLOOD BY AUTOMATED COUNT: 42.3 FL (ref 35.9–50)
EST. AVERAGE GLUCOSE BLD GHB EST-MCNC: 114 MG/DL
FASTING STATUS PATIENT QL REPORTED: NORMAL
GLOBULIN SER CALC-MCNC: 3.1 G/DL (ref 1.9–3.5)
GLUCOSE SERPL-MCNC: 95 MG/DL (ref 65–99)
HBA1C MFR BLD: 5.6 % (ref 4–5.6)
HCT VFR BLD AUTO: 39.7 % (ref 37–47)
HCV AB SER QL: NORMAL
HDLC SERPL-MCNC: 65 MG/DL
HGB BLD-MCNC: 13.5 G/DL (ref 12–16)
IMM GRANULOCYTES # BLD AUTO: 0.01 K/UL (ref 0–0.11)
IMM GRANULOCYTES NFR BLD AUTO: 0.2 % (ref 0–0.9)
LDLC SERPL CALC-MCNC: 78 MG/DL
LYMPHOCYTES # BLD AUTO: 1.59 K/UL (ref 1–4.8)
LYMPHOCYTES NFR BLD: 35.3 % (ref 22–41)
MCH RBC QN AUTO: 29.9 PG (ref 27–33)
MCHC RBC AUTO-ENTMCNC: 34 G/DL (ref 33.6–35)
MCV RBC AUTO: 88 FL (ref 81.4–97.8)
MONOCYTES # BLD AUTO: 0.39 K/UL (ref 0–0.85)
MONOCYTES NFR BLD AUTO: 8.6 % (ref 0–13.4)
NEUTROPHILS # BLD AUTO: 2.44 K/UL (ref 2–7.15)
NEUTROPHILS NFR BLD: 54.1 % (ref 44–72)
NRBC # BLD AUTO: 0 K/UL
NRBC BLD-RTO: 0 /100 WBC
PLATELET # BLD AUTO: 257 K/UL (ref 164–446)
PMV BLD AUTO: 9.7 FL (ref 9–12.9)
POTASSIUM SERPL-SCNC: 4.8 MMOL/L (ref 3.6–5.5)
PROT SERPL-MCNC: 7.2 G/DL (ref 6–8.2)
RBC # BLD AUTO: 4.51 M/UL (ref 4.2–5.4)
SODIUM SERPL-SCNC: 140 MMOL/L (ref 135–145)
TRIGL SERPL-MCNC: 54 MG/DL (ref 0–149)
WBC # BLD AUTO: 4.5 K/UL (ref 4.8–10.8)

## 2021-08-02 PROCEDURE — 80061 LIPID PANEL: CPT

## 2021-08-02 PROCEDURE — 85025 COMPLETE CBC W/AUTO DIFF WBC: CPT

## 2021-08-02 PROCEDURE — 83036 HEMOGLOBIN GLYCOSYLATED A1C: CPT

## 2021-08-02 PROCEDURE — 80053 COMPREHEN METABOLIC PANEL: CPT

## 2021-08-02 PROCEDURE — 36415 COLL VENOUS BLD VENIPUNCTURE: CPT

## 2021-08-02 PROCEDURE — 86803 HEPATITIS C AB TEST: CPT

## 2021-08-26 ENCOUNTER — PATIENT MESSAGE (OUTPATIENT)
Dept: HEALTH INFORMATION MANAGEMENT | Facility: OTHER | Age: 66
End: 2021-08-26

## 2021-08-27 DIAGNOSIS — E06.3 HYPOTHYROIDISM, ACQUIRED, AUTOIMMUNE: ICD-10-CM

## 2021-08-30 ENCOUNTER — TELEPHONE (OUTPATIENT)
Dept: HEALTH INFORMATION MANAGEMENT | Facility: OTHER | Age: 66
End: 2021-08-30

## 2021-08-30 DIAGNOSIS — E06.3 HYPOTHYROIDISM, ACQUIRED, AUTOIMMUNE: ICD-10-CM

## 2021-08-30 RX ORDER — LEVOTHYROXINE SODIUM 100 MCG
TABLET ORAL
Qty: 90 TABLET | Refills: 0 | Status: SHIPPED | OUTPATIENT
Start: 2021-08-30 | End: 2022-05-23

## 2021-08-30 RX ORDER — LEVOTHYROXINE SODIUM 100 MCG
100 TABLET ORAL EVERY MORNING
Qty: 90 TABLET | Refills: 1 | Status: SHIPPED | OUTPATIENT
Start: 2021-08-30 | End: 2021-11-29 | Stop reason: SDUPTHER

## 2021-08-30 NOTE — TELEPHONE ENCOUNTER
Received request via: Pharmacy    Was the patient seen in the last year in this department? Yes    Does the patient have an active prescription (recently filled or refills available) for medication(s) requested? No       SYNTHROID 100 MCG Tab      Sig: TAKE 1 TABLET BY MOUTH IN THE MORNING ON AN EMPTY STOMACH

## 2021-09-09 PROBLEM — R41.89 COGNITIVE DEFICITS: Status: ACTIVE | Noted: 2021-09-09

## 2021-11-03 ENCOUNTER — TELEPHONE (OUTPATIENT)
Dept: ENDOCRINOLOGY | Facility: MEDICAL CENTER | Age: 66
End: 2021-11-03

## 2021-11-03 ENCOUNTER — OFFICE VISIT (OUTPATIENT)
Dept: ENDOCRINOLOGY | Facility: MEDICAL CENTER | Age: 66
End: 2021-11-03
Attending: INTERNAL MEDICINE
Payer: MEDICARE

## 2021-11-03 VITALS
DIASTOLIC BLOOD PRESSURE: 70 MMHG | BODY MASS INDEX: 20.55 KG/M2 | HEIGHT: 63 IN | RESPIRATION RATE: 16 BRPM | WEIGHT: 116 LBS | OXYGEN SATURATION: 99 % | SYSTOLIC BLOOD PRESSURE: 118 MMHG | HEART RATE: 82 BPM

## 2021-11-03 DIAGNOSIS — E03.4 HYPOTHYROIDISM DUE TO ACQUIRED ATROPHY OF THYROID: ICD-10-CM

## 2021-11-03 DIAGNOSIS — M81.0 AGE-RELATED OSTEOPOROSIS WITHOUT CURRENT PATHOLOGICAL FRACTURE: ICD-10-CM

## 2021-11-03 PROCEDURE — 99211 OFF/OP EST MAY X REQ PHY/QHP: CPT | Performed by: INTERNAL MEDICINE

## 2021-11-03 PROCEDURE — 99214 OFFICE O/P EST MOD 30 MIN: CPT | Performed by: INTERNAL MEDICINE

## 2021-11-03 ASSESSMENT — FIBROSIS 4 INDEX: FIB4 SCORE: 1.39

## 2021-11-04 ENCOUNTER — PATIENT MESSAGE (OUTPATIENT)
Dept: ENDOCRINOLOGY | Facility: MEDICAL CENTER | Age: 66
End: 2021-11-04

## 2021-11-04 NOTE — PROGRESS NOTES
"Chief Complaint   Patient presents with   • Hypothyroidism     Presumptive autoimmune based on ultrasound   • Osteoporosis        HPI:    This was a difficult session today.  I have supervised Stephanie's hypothyroidism since 2014.  Prior to that she was cared for by [facility] Fraire [facility] starting [facility].  She has always used Synthroid and indicates that she tried levothyroxine in the past and it was unsatisfactory.  That was determined prior to my association with her.  I do not have records to document her need for brand Synthroid and I do not have previous records document problems.  She cannot recall now that was decided.  She is now under  Stockpile plus program which does not consider this a regular formulary medication.  She is asking me to request approval for the brand Synthroid.  Dr. Fraire has moved away and those records are not readily available. It seems we may have to start over with levothyroxine.  Her recent TSH was 2.4.    The other difficult part of this conversation was about osteoporosis treatment.  Her bone density has slowly evolved into the osteoporosis category.  I have explained to her that medical treatment is indicated.  Lumbar spine and hip T scores are now -2.8.  FRAX calculation 3.9% and 23%.  She will not accept my recommendation to consider medical therapy because she considers herself in good health.  She has an active lifestyle doing regular exercises, hiking, a good diet including calcium sources.  Her vitamin D level was 56 in May.      She is not interested in my description of treatment options.    ROS:  All other systems reported as negative or unchanged since last exam      Allergies:   Allergies   Allergen Reactions   • Morphine Unspecified     Hallucinations  RXN=24 years ago; patient reports \"sensitive to Morphine\" not a true allergy       Current medicines including changes today:  Current Outpatient Medications   Medication Sig Dispense Refill   • SYNTHROID 100 " "MCG Tab TAKE 1 TABLET BY MOUTH IN THE MORNING ON AN EMPTY STOMACH 90 Tablet 0   • SYNTHROID 100 MCG Tab Take 1 Tablet by mouth every morning. ON A EMPTY STOMACH 90 Tablet 1   • multivitamin (THERAGRAN) Tab Take 1 Tab by mouth every day.     • Calcium Citrate-Vitamin D (CALCIUM + D PO) Take 1 Tab by mouth every day.     • Ginkgo Biloba 40 MG Tab Take 1 Tab by mouth every day.     • b complex vitamins tablet Take 1 Tab by mouth every day.     • Cinnamon 500 MG Cap Take  by mouth.     • [place] Berry 550 MG Cap Take 1 Cap by mouth every day.       No current facility-administered medications for this visit.        Past Medical History:   Diagnosis Date   • Anxiety    • CATARACT     megan IOL   • Chronic thyroiditis      + US / neg ab   • Depression     mood swings   • Dyslipidemia    • Gynecological disorder    • Hypothyroidism    • Keratoconjunctivitis sicca    • multiple thyroid nodules     subcentimeter / 2016 heterogeneous okay   • Osteopenia 2005   • S/P hysterectomy with oophorectomy 2016    prolapse.   • Urinary incontinence 11/27/2017   • Urinary tract infection, site not specified        PHYSICAL EXAM:    /70 (BP Location: Left arm, Patient Position: Sitting, BP Cuff Size: Adult)   Pulse 82   Resp 16   Ht 1.6 m (5' 3\")   Wt 52.6 kg (116 lb)   SpO2 99%   BMI 20.55 kg/m²     Gen.   appears healthy, good posture    Skin   appropriate for sex and age    HEENT  unremarkable    Neck   thyroid gland is small and difficult to palpate or it might be substernal.  No palpable nodules.    Heart  regular    Extremities  no edema    Neuro  gait and station normal    Psych  appropriate      ASSESSMENT AND RECOMMENDATIONS    1. Hypothyroidism, presumptive thyroiditis              Apparently Hashimoto's antibodies were negative in the past but her thyroid ultrasound showed a heterogeneous echotexture.  Initially she had multiple small nodules which are no longer observed as such on the most recent ultrasound.       "       Clinically euthyroid taking Synthroid 100 mcg.  We may have to go through a trial of levothyroxine based on the above comments.      2. Age-related osteoporosis without current pathological fracture          She refuses to believe that she needs to take medication for osteoporosis per HPI,       DISPOSITION: I will speak with her further about a trial of levothyroxine.    I have discussed I am retiring first of next year the other providers in our clinic will assume care.      Daryl Lopez M.D.    Copies to: Deana Chao M.D. [phone]

## 2021-11-05 NOTE — PROGRESS NOTES
Telephone conversation with patient    I cannot find in our record the justification for her using brand Synthroid as opposed to levothyroxine. She may have to have a trial on levothyroxine but for now she is getting Synthroid from [facility].    Next month she will be going to [place] for 5 months until April. She is going to talk with the pharmacist about how her thyroid will be covered during that period of time. I expect a [facility] pharmacy in [place] can fill it for her as necessary. She will clarify if that before she leaves.    Daryl Lopez M.D.

## 2021-11-29 DIAGNOSIS — E06.3 HYPOTHYROIDISM, ACQUIRED, AUTOIMMUNE: ICD-10-CM

## 2021-11-29 RX ORDER — LEVOTHYROXINE SODIUM 100 MCG
100 TABLET ORAL EVERY MORNING
Qty: 90 TABLET | Refills: 1 | Status: SHIPPED | OUTPATIENT
Start: 2021-11-29 | End: 2022-02-01 | Stop reason: SDUPTHER

## 2021-12-03 ENCOUNTER — TELEPHONE (OUTPATIENT)
Dept: ENDOCRINOLOGY | Facility: MEDICAL CENTER | Age: 66
End: 2021-12-03

## 2021-12-03 DIAGNOSIS — E06.3 HYPOTHYROIDISM, ACQUIRED, AUTOIMMUNE: ICD-10-CM

## 2021-12-03 RX ORDER — LEVOTHYROXINE SODIUM 0.1 MG/1
100 TABLET ORAL
Qty: 150 TABLET | Refills: 1 | Status: SHIPPED | OUTPATIENT
Start: 2021-12-03 | End: 2022-07-08

## 2021-12-03 NOTE — TELEPHONE ENCOUNTER
1. Caller Name: Lidia Fitzpatrick                        Call Back Number: [phone] (home)      2. Message: Patient called in stating she needs a PA for SYNTHROID 100 MCG Tab. We will start this process but while she waits are you able to send over levothyroxine? Please let us know and we can call her back.     Thank you.     3. Patient approves office to leave a detailed voicemail/MyChart message: yes

## 2021-12-04 NOTE — TELEPHONE ENCOUNTER
Telephone conversation with patient    She checked with [facility] about getting brand Synthroid and  indicated that it has not been authorized but I have not received any request either.  She is getting ready to go to [place] for 4 months and wants to take a full supply of levothyroxine with her.  She is requesting a 150 tablets.  I gave her options to get a smaller amount with refills that could be done at any [facility].  Or pay cash and Synthroid delivers for mail order.  She does not want to do either one so I will give her the requested amount to take [place].    Daryl Lopez M.D.

## 2021-12-22 ENCOUNTER — PATIENT MESSAGE (OUTPATIENT)
Dept: MEDICAL GROUP | Age: 66
End: 2021-12-22

## 2021-12-22 DIAGNOSIS — D64.9 NORMOCYTIC ANEMIA: ICD-10-CM

## 2021-12-22 DIAGNOSIS — Z52.008 BLOOD DONOR: ICD-10-CM

## 2021-12-27 PROBLEM — Z52.008 BLOOD DONOR: Status: ACTIVE | Noted: 2021-12-27

## 2022-02-09 ENCOUNTER — TELEPHONE (OUTPATIENT)
Dept: ENDOCRINOLOGY | Facility: MEDICAL CENTER | Age: 67
End: 2022-02-09

## 2022-02-09 NOTE — TELEPHONE ENCOUNTER
DOCUMENTATION OF PAR STATUS:    1. Name of Medication & Dose: Synthroid 100MCG tablets     2. Name of Prescription Coverage Company & phone #: [employer]    3. Date Prior Auth Submitted: 02/09/2022    4. What information was given to obtain insurance decision? In Chart    5. Prior Auth Status? Pending    6. Patient Notified: yes

## 2022-02-09 NOTE — TELEPHONE ENCOUNTER
[facility] pharmacy called and states they need a prior auth for synthroid 100 mcg.       Please advise.

## 2022-02-09 NOTE — TELEPHONE ENCOUNTER
MEDICATION PRIOR AUTHORIZATION NEEDED:    1. Name of Medication: Synthroid 100MCG tablets    2. [facility]ed By ([facility]     3. Is insurance on file current? Yes    4. What is the name & phone number of the 3rd party [facility]

## 2022-02-10 NOTE — TELEPHONE ENCOUNTER
FINAL PRIOR AUTHORIZATION STATUS:    1.  Name of Medication & Dose: Synthroid 100MCG tablets      2. Prior Auth Status: Approved through 02/08/2023     3. Action Taken: Pharmacy Notified: yes Patient Notified: yes

## 2022-05-10 ENCOUNTER — OFFICE VISIT (OUTPATIENT)
Dept: ENDOCRINOLOGY | Facility: MEDICAL CENTER | Age: 67
End: 2022-05-10
Payer: MEDICARE

## 2022-05-10 VITALS
BODY MASS INDEX: 20.38 KG/M2 | WEIGHT: 115 LBS | HEIGHT: 63 IN | OXYGEN SATURATION: 100 % | DIASTOLIC BLOOD PRESSURE: 64 MMHG | HEART RATE: 78 BPM | SYSTOLIC BLOOD PRESSURE: 110 MMHG

## 2022-05-10 DIAGNOSIS — E06.3 HYPOTHYROIDISM, ACQUIRED, AUTOIMMUNE: ICD-10-CM

## 2022-05-10 DIAGNOSIS — E55.9 VITAMIN D DEFICIENCY: ICD-10-CM

## 2022-05-10 DIAGNOSIS — M81.0 POSTMENOPAUSAL OSTEOPOROSIS: ICD-10-CM

## 2022-05-10 PROCEDURE — 99211 OFF/OP EST MAY X REQ PHY/QHP: CPT

## 2022-05-10 PROCEDURE — 99214 OFFICE O/P EST MOD 30 MIN: CPT

## 2022-05-10 RX ORDER — LEVOTHYROXINE SODIUM 0.1 MG/1
100 TABLET ORAL
Qty: 90 TABLET | Refills: 1 | Status: SHIPPED | OUTPATIENT
Start: 2022-05-10 | End: 2022-05-23

## 2022-05-10 ASSESSMENT — FIBROSIS 4 INDEX: FIB4 SCORE: 1.39

## 2022-05-10 NOTE — PROGRESS NOTES
Chief Complaint: Consult requested by Deana Chao M.D. for evaluation of Hypothyroidism    HPI:   1. Hypothyroidism:   Lidia Fitzpatrick is a 66 y.o. female with and is here for initial evaluation.      She denies fatigue, weight changes, heat/cold intolerance, bowel/skin changes or CVS symptoms    She denies lumps or enlargement in the neck.    She reports a family history of Hypothyroidism with her aunt.       She is currently on Levothyroxine 100 mcg daily which has been her thyroid hormone dose since 8 months.     She denies any recent dose changes.   She reports Good compliance and takes her thyroid hormone daily before breakfast.    She denies taking any iron, or antacids.   Reports taking calcium in the evening with dinner  Denies taking biotin     Ref. Range 5/12/2021 15:09   TSH Latest Ref Range: 0.380 - 5.330 uIU/mL 2.400   Free T-4 Latest Ref Range: 0.93 - 1.70 ng/dL 1.46     2. Postmenopausal Osteoporosis:  We discussed osteoporosis and treatment while we treat osteoporosis  At this time patient refused treatment stating he is very active and does not require treatment for osteoporosis  She exercises every day by hiking when the weather permits  She takes calcium and vitamin D daily, she does not recall the dosages at this time    Dexa scan on 7/20/2021 showed: Lumbar T score of -2.8, left femur T score -2.8  Dexa scan on 7/5/2019 showed: Lumbar T score -2.3, left femur T score -2.4  Dexa scan on 5/12/2017 showed: Lumbar T score -2.2, left femur T score -2.1    3.  Vitamin D deficiency:  Currently taking everyday.    Ref. Range 5/12/2021 15:09   25-Hydroxy   Vitamin D 25 Latest Ref Range: 30 - 80 ng/mL 56     Patient's medications, allergies, and social histories were reviewed and updated as appropriate.      ROS:     CONS:     No fever, no chills, no weight loss, no fatigue   EYES:      No diplopia, no blurry vision, no redness of eyes, no swelling of eyelids   ENT:    No hearing loss, No ear pain, No  sore throat, no dysphagia, no neck swelling   CV:     No chest pain, no palpitations, no claudication, no orthopnea, no PND   PULM:    No SOB, no cough, no hemoptysis, no wheezing    GI:   No nausea, no vomiting, no diarrhea, no constipation, no bloody stools   :  Passing urine well, no dysuria, no hematuria   ENDO:   No polyuria, no polydipsia, no heat intolerance, no cold intolerance   NEURO: No headaches, no dizziness, no convulsions, no tremors   MUSC:  No joint swellings, no arthralgias, no myalgias, no weakness   SKIN:   No rash, no ulcers, no dry skin   PSYCH:   No depression, no anxiety, no difficulty sleeping       Past Medical History:  Patient Active Problem List    Diagnosis Date Noted   • Blood donor 12/27/2021   • Cognitive deficits 09/09/2021   • Body mass index (BMI) of 19.0 to 19.9 in adult 09/09/2021   • Continuous leakage of urine 05/18/2021   • Impaired fasting blood sugar 05/01/2020   • Dyslipidemia    • Atrophic vaginitis 03/09/2018   • S/P MITCH-BSO 03/17/2017   • Age-related osteoporosis without current pathological fracture    • Hypothyroidism_Dr Lopez 07/06/2010       Past Surgical History:  Past Surgical History:   Procedure Laterality Date   • CARPAL TUNNEL ENDOSCOPIC Right 11/29/2017    Procedure: CARPAL TUNNEL ENDOSCOPIC;  Surgeon: Raheem Ennis M.D.;  Location: [facility];  Service: Orthopedics   • HYSTERECTOMY ROBOTIC  11/21/2016    Procedure: HYSTERECTOMY ROBOTIC SUPRA CERVICAL WITH BILATERAL SALPINGO-OOPHORECTOMY;  Surgeon: Columba Estrada M.D.;  Location: [facility];  Service:    • COLPOSACROPEXY ROBOTIC  11/21/2016    Procedure: COLPOSACROPEXY ROBOTIC;  Surgeon: Columba Estrada M.D.;  Location: [facility];  Service:    • CYSTOSCOPY  11/21/2016    Procedure: CYSTOSCOPY;  Surgeon: Columba Estrada M.D.;  Location: [facility];  Service:    • VAGINAL PERINEAL EXAM REPAIR  11/21/2016    Procedure: VAGINAL  PERINEAL EXAM REPAIR FOR PERINEORRHAPHY, LEVATORPLASTY;  Surgeon: Columba Estrada M.D.;  Location: SURGERY [facility];  Service:    • ABDOMINAL HYSTERECTOMY TOTAL     • BLADDER SUSPENSION     • TUBAL COAGULATION LAPAROSCOPIC BILATERAL          Allergies:  Morphine     Current Medications:    Current Outpatient Medications:   •  SYNTHROID 100 MCG Tab, Take 1 Tablet by mouth every morning. ON A EMPTY STOMACH, Disp: 90 Tablet, Rfl: 1  •  levothyroxine (SYNTHROID) 100 MCG Tab, Take 1 Tablet by mouth every morning on an empty stomach., Disp: 150 Tablet, Rfl: 1  •  SYNTHROID 100 MCG Tab, TAKE 1 TABLET BY MOUTH IN THE MORNING ON AN EMPTY STOMACH, Disp: 90 Tablet, Rfl: 0  •  multivitamin (THERAGRAN) Tab, Take 1 Tab by mouth every day., Disp: , Rfl:   •  Calcium Citrate-Vitamin D (CALCIUM + D PO), Take 1 Tab by mouth every day., Disp: , Rfl:   •  Ginkgo Biloba 40 MG Tab, Take 1 Tab by mouth every day., Disp: , Rfl:   •  b complex vitamins tablet, Take 1 Tab by mouth every day., Disp: , Rfl:     Social History:  Social History     Socioeconomic History   • Marital status:      Spouse name: Not on file   • Number of children: Not on file   • Years of education: Not on file   • Highest education level: Not on file   Occupational History   • Not on file   Tobacco Use   • Smoking status: Never Smoker   • Smokeless tobacco: Never Used   Vaping Use   • Vaping Use: Never used   Substance and Sexual Activity   • Alcohol use: No   • Drug use: No   • Sexual activity: Not Currently     Partners: Male     Birth control/protection: Post-Menopausal   Other Topics Concern   • Not on file   Social History Narrative   • Not on file     Social Determinants of Health     Financial Resource Strain: Not on file   Food Insecurity: Not on file   Transportation Needs: Not on file   Physical Activity: Not on file   Stress: Not on file   Social Connections: Not on file   Intimate Partner Violence: Not on file   Housing Stability: Not  "on file        Family History:   Family History   Problem Relation Age of Onset   • Hyperlipidemia Mother    • Hyperlipidemia Father    • Heart Disease Father         PCI   • Stroke Maternal Grandmother 87   • Cancer Paternal Grandfather         Stomach   • Cancer Maternal Aunt 62        Breast   • Cancer Paternal Uncle         digestive, unknown type   • Psychiatric Illness Brother          PHYSICAL EXAM:   Vital signs: /64 (BP Location: Left arm, Patient Position: Sitting, BP Cuff Size: Adult)   Pulse 78   Ht 1.6 m (5' 3\")   Wt 52.2 kg (115 lb)   SpO2 100%   BMI 20.37 kg/m²   GENERAL: Well-developed, well-nourished  in no apparent distress.   EYE: No ocular and eyelid asymmetry, Anicteric sclerae,  PERRL  HENT: Hearing grossly intact, Normocephalic, atraumatic.   NECK: Supple. Trachea midline. thyroid is normal in size without nodules or tenderness  CARDIOVASCULAR: Regular rate and rhythm. No murmurs, rubs, or gallops.   LUNGS: Clear to auscultation bilaterally   EXTREMITIES: No clubbing, cyanosis, or edema.   NEUROLOGICAL: Cranial nerves II-XII are grossly intact   Symmetric reflexes at the patella no proximal muscle weakness  LYMPH: No cervical, supraclavicular,  adenopathy palpated.   SKIN: No rashes, lesions. Turgor is normal.    Labs:  Lab Results   Component Value Date/Time    WBC 4.5 (L) 08/02/2021 10:29 AM    RBC 4.51 08/02/2021 10:29 AM    HEMOGLOBIN 13.5 08/02/2021 10:29 AM    MCV 88.0 08/02/2021 10:29 AM    MCH 29.9 08/02/2021 10:29 AM    MCHC 34.0 08/02/2021 10:29 AM    RDW 42.3 08/02/2021 10:29 AM    MPV 9.7 08/02/2021 10:29 AM       Lab Results   Component Value Date/Time    SODIUM 140 08/02/2021 10:29 AM    POTASSIUM 4.8 08/02/2021 10:29 AM    CHLORIDE 105 08/02/2021 10:29 AM    CO2 25 08/02/2021 10:29 AM    ANION 10.0 08/02/2021 10:29 AM    GLUCOSE 95 08/02/2021 10:29 AM    BUN 21 08/02/2021 10:29 AM    CREATININE 0.86 08/02/2021 10:29 AM    CALCIUM 9.3 08/02/2021 10:29 AM    ASTDEDRICK 19 " 08/02/2021 10:29 AM    ALTSGPT 12 08/02/2021 10:29 AM    TBILIRUBIN 0.8 08/02/2021 10:29 AM    ALBUMIN 4.1 08/02/2021 10:29 AM    TOTPROTEIN 7.2 08/02/2021 10:29 AM    GLOBULIN 3.1 08/02/2021 10:29 AM    AGRATIO 1.3 08/02/2021 10:29 AM       Lab Results   Component Value Date/Time    CHOLSTRLTOT 154 08/02/2021 1029    TRIGLYCERIDE 54 08/02/2021 1029    HDL 65 08/02/2021 1029    LDL 78 08/02/2021 1029       Lab Results   Component Value Date/Time    TSHULTRASEN 2.400 05/12/2021 1509     Lab Results   Component Value Date/Time    FREET4 1.46 05/12/2021 1509     Lab Results   Component Value Date/Time    FREET3 3.31 07/09/2016 0958     No results found for: THYSTIMIG    Lab Results   Component Value Date/Time    MICROSOMALA 1.1 07/09/2016 0958         Imaging:      ASSESSMENT/PLAN:   1. Hypothyroidism, acquired, autoimmune  Clinically stable  Biochemically stable    Patient and I discussed pathophysiology of hypothyroidism, signs and symptoms  We discussed transferring to primary care to continue hypothyroidism care    At this time she does not have a primary care provider, referral sent  - Referral to establish with [facility] PCP    Prescription sent to pharmacy  - levothyroxine (SYNTHROID) 100 MCG Tab; Take 1 Tablet by mouth every morning on an empty stomach.  Dispense: 90 Tablet; Refill: 1    - TSH; Future  - FREE THYROXINE; Future  - Comp Metabolic Panel; Future  - US-THYROID; Future    2. Postmenopausal osteoporosis  Unstable  Patient denies the need of treatment at this time, see HPI    - VITAMIN D,25 HYDROXY; Future    3. Vitamin D deficiency  Stable  Continue regimen  - VITAMIN D,25 HYDROXY; Future    Disposition: Make an appointment to follow-up in 6 months  Do your blood work before your next appointment  If you establish with primary care prior to next appointment you can cancel the appointment otherwise we will follow-up in 6 months    Recommendation: Patient has been stable for the past few years on 100 mcg  of levothyroxine, she prefers brand name Synthroid but [facility] does not approve with all the time.   She has a diagnosis of osteoporosis for which she refuses treatment.  She takes calcium and vitamin D daily (unknow dosages), and hikes every day.  She is a stable patient that can be seen every 6 months to evaluate blood work versus symptoms    Thank you kindly for allowing me to participate in the thyroid care plan for this patient.    RAYMON JohnsonR.N.  05/10/22    CC:   Deana Chao M.D.

## 2022-05-14 ENCOUNTER — PATIENT MESSAGE (OUTPATIENT)
Dept: MEDICAL GROUP | Age: 67
End: 2022-05-14
Payer: MEDICARE

## 2022-05-23 ENCOUNTER — OFFICE VISIT (OUTPATIENT)
Dept: MEDICAL GROUP | Age: 67
End: 2022-05-23
Payer: MEDICARE

## 2022-05-23 VITALS
SYSTOLIC BLOOD PRESSURE: 118 MMHG | TEMPERATURE: 98.7 F | DIASTOLIC BLOOD PRESSURE: 60 MMHG | WEIGHT: 111.8 LBS | HEIGHT: 63 IN | OXYGEN SATURATION: 100 % | HEART RATE: 65 BPM | BODY MASS INDEX: 19.81 KG/M2 | RESPIRATION RATE: 15 BRPM

## 2022-05-23 DIAGNOSIS — R41.89 COGNITIVE DEFICITS: ICD-10-CM

## 2022-05-23 DIAGNOSIS — M81.0 AGE-RELATED OSTEOPOROSIS WITHOUT CURRENT PATHOLOGICAL FRACTURE: ICD-10-CM

## 2022-05-23 DIAGNOSIS — Z23 NEED FOR VACCINATION: ICD-10-CM

## 2022-05-23 DIAGNOSIS — E78.5 DYSLIPIDEMIA: Chronic | ICD-10-CM

## 2022-05-23 DIAGNOSIS — E03.4 HYPOTHYROIDISM DUE TO ACQUIRED ATROPHY OF THYROID: ICD-10-CM

## 2022-05-23 PROBLEM — Z78.9 VEGETARIAN DIET: Status: ACTIVE | Noted: 2022-05-23

## 2022-05-23 PROCEDURE — 90471 IMMUNIZATION ADMIN: CPT | Performed by: FAMILY MEDICINE

## 2022-05-23 PROCEDURE — 99214 OFFICE O/P EST MOD 30 MIN: CPT | Mod: 25 | Performed by: FAMILY MEDICINE

## 2022-05-23 PROCEDURE — 90677 PCV20 VACCINE IM: CPT | Performed by: FAMILY MEDICINE

## 2022-05-23 ASSESSMENT — PATIENT HEALTH QUESTIONNAIRE - PHQ9: CLINICAL INTERPRETATION OF PHQ2 SCORE: 0

## 2022-05-23 ASSESSMENT — FIBROSIS 4 INDEX: FIB4 SCORE: 1.39

## 2022-05-24 ENCOUNTER — HOSPITAL ENCOUNTER (OUTPATIENT)
Dept: LAB | Facility: MEDICAL CENTER | Age: 67
End: 2022-05-24
Attending: FAMILY MEDICINE
Payer: MEDICARE

## 2022-05-24 ENCOUNTER — HOSPITAL ENCOUNTER (OUTPATIENT)
Dept: LAB | Facility: MEDICAL CENTER | Age: 67
End: 2022-05-24
Payer: MEDICARE

## 2022-05-24 ENCOUNTER — TELEPHONE (OUTPATIENT)
Dept: ENDOCRINOLOGY | Facility: MEDICAL CENTER | Age: 67
End: 2022-05-24

## 2022-05-24 ENCOUNTER — PATIENT MESSAGE (OUTPATIENT)
Dept: MEDICAL GROUP | Age: 67
End: 2022-05-24

## 2022-05-24 DIAGNOSIS — M81.0 POSTMENOPAUSAL OSTEOPOROSIS: ICD-10-CM

## 2022-05-24 DIAGNOSIS — D64.9 NORMOCYTIC ANEMIA: ICD-10-CM

## 2022-05-24 DIAGNOSIS — Z52.008 BLOOD DONOR: ICD-10-CM

## 2022-05-24 DIAGNOSIS — E55.9 VITAMIN D DEFICIENCY: ICD-10-CM

## 2022-05-24 DIAGNOSIS — E78.5 DYSLIPIDEMIA: Chronic | ICD-10-CM

## 2022-05-24 DIAGNOSIS — E06.3 HYPOTHYROIDISM, ACQUIRED, AUTOIMMUNE: ICD-10-CM

## 2022-05-24 DIAGNOSIS — E03.4 HYPOTHYROIDISM DUE TO ACQUIRED ATROPHY OF THYROID: ICD-10-CM

## 2022-05-24 LAB
ALBUMIN SERPL BCP-MCNC: 4.1 G/DL (ref 3.2–4.9)
ALBUMIN/GLOB SERPL: 1.3 G/DL
ALP SERPL-CCNC: 95 U/L (ref 30–99)
ALT SERPL-CCNC: 12 U/L (ref 2–50)
ANION GAP SERPL CALC-SCNC: 10 MMOL/L (ref 7–16)
AST SERPL-CCNC: 19 U/L (ref 12–45)
BASOPHILS # BLD AUTO: 0.6 % (ref 0–1.8)
BASOPHILS # BLD: 0.04 K/UL (ref 0–0.12)
BILIRUB SERPL-MCNC: 0.6 MG/DL (ref 0.1–1.5)
BUN SERPL-MCNC: 23 MG/DL (ref 8–22)
CALCIUM SERPL-MCNC: 9.3 MG/DL (ref 8.4–10.2)
CHLORIDE SERPL-SCNC: 102 MMOL/L (ref 96–112)
CHOLEST SERPL-MCNC: 173 MG/DL (ref 100–199)
CO2 SERPL-SCNC: 26 MMOL/L (ref 20–33)
CREAT SERPL-MCNC: 0.83 MG/DL (ref 0.5–1.4)
EOSINOPHIL # BLD AUTO: 0.02 K/UL (ref 0–0.51)
EOSINOPHIL NFR BLD: 0.3 % (ref 0–6.9)
ERYTHROCYTE [DISTWIDTH] IN BLOOD BY AUTOMATED COUNT: 42.2 FL (ref 35.9–50)
FASTING STATUS PATIENT QL REPORTED: NORMAL
GFR SERPLBLD CREATININE-BSD FMLA CKD-EPI: 78 ML/MIN/1.73 M 2
GLOBULIN SER CALC-MCNC: 3.2 G/DL (ref 1.9–3.5)
GLUCOSE SERPL-MCNC: 104 MG/DL (ref 65–99)
HCT VFR BLD AUTO: 37.3 % (ref 37–47)
HDLC SERPL-MCNC: 69 MG/DL
HGB BLD-MCNC: 12.4 G/DL (ref 12–16)
IMM GRANULOCYTES # BLD AUTO: 0.02 K/UL (ref 0–0.11)
IMM GRANULOCYTES NFR BLD AUTO: 0.3 % (ref 0–0.9)
LDLC SERPL CALC-MCNC: 91 MG/DL
LYMPHOCYTES # BLD AUTO: 1.36 K/UL (ref 1–4.8)
LYMPHOCYTES NFR BLD: 19.6 % (ref 22–41)
MCH RBC QN AUTO: 30.2 PG (ref 27–33)
MCHC RBC AUTO-ENTMCNC: 33.2 G/DL (ref 33.6–35)
MCV RBC AUTO: 90.8 FL (ref 81.4–97.8)
MONOCYTES # BLD AUTO: 0.55 K/UL (ref 0–0.85)
MONOCYTES NFR BLD AUTO: 7.9 % (ref 0–13.4)
NEUTROPHILS # BLD AUTO: 4.95 K/UL (ref 2–7.15)
NEUTROPHILS NFR BLD: 71.3 % (ref 44–72)
NRBC # BLD AUTO: 0 K/UL
NRBC BLD-RTO: 0 /100 WBC
PLATELET # BLD AUTO: 271 K/UL (ref 164–446)
PMV BLD AUTO: 9.4 FL (ref 9–12.9)
POTASSIUM SERPL-SCNC: 4.1 MMOL/L (ref 3.6–5.5)
PROT SERPL-MCNC: 7.3 G/DL (ref 6–8.2)
RBC # BLD AUTO: 4.11 M/UL (ref 4.2–5.4)
SODIUM SERPL-SCNC: 138 MMOL/L (ref 135–145)
T4 FREE SERPL-MCNC: 2.02 NG/DL (ref 0.93–1.7)
TRIGL SERPL-MCNC: 63 MG/DL (ref 0–149)
TSH SERPL DL<=0.005 MIU/L-ACNC: 0.01 UIU/ML (ref 0.38–5.33)
WBC # BLD AUTO: 6.9 K/UL (ref 4.8–10.8)

## 2022-05-24 PROCEDURE — 84439 ASSAY OF FREE THYROXINE: CPT

## 2022-05-24 PROCEDURE — 82306 VITAMIN D 25 HYDROXY: CPT

## 2022-05-24 PROCEDURE — 82746 ASSAY OF FOLIC ACID SERUM: CPT

## 2022-05-24 PROCEDURE — 82728 ASSAY OF FERRITIN: CPT

## 2022-05-24 PROCEDURE — 36415 COLL VENOUS BLD VENIPUNCTURE: CPT

## 2022-05-24 PROCEDURE — 80053 COMPREHEN METABOLIC PANEL: CPT

## 2022-05-24 PROCEDURE — 83550 IRON BINDING TEST: CPT

## 2022-05-24 PROCEDURE — 82607 VITAMIN B-12: CPT

## 2022-05-24 PROCEDURE — 84443 ASSAY THYROID STIM HORMONE: CPT

## 2022-05-24 PROCEDURE — 80061 LIPID PANEL: CPT

## 2022-05-24 PROCEDURE — 85025 COMPLETE CBC W/AUTO DIFF WBC: CPT

## 2022-05-24 PROCEDURE — 83540 ASSAY OF IRON: CPT

## 2022-05-24 NOTE — PATIENT INSTRUCTIONS
Please do fasting blood tests before your next office visit with me.     Please bring your family members ( and/or sons) to next office visit.     I referred you to the neurologist. You will be contacted shortly for appointment.
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
## 2022-05-24 NOTE — TELEPHONE ENCOUNTER
Patient now has an appt for 07/08/2022. She is informed an updated referral is needed for this visit.

## 2022-05-25 LAB
FERRITIN SERPL-MCNC: 13.7 NG/ML (ref 10–291)
FOLATE SERPL-MCNC: 28.1 NG/ML
IRON SATN MFR SERPL: 24 % (ref 15–55)
IRON SERPL-MCNC: 74 UG/DL (ref 40–170)
TIBC SERPL-MCNC: 313 UG/DL (ref 250–450)
UIBC SERPL-MCNC: 239 UG/DL (ref 110–370)
VIT B12 SERPL-MCNC: 1374 PG/ML (ref 211–911)

## 2022-05-26 ENCOUNTER — TELEPHONE (OUTPATIENT)
Dept: MEDICAL GROUP | Age: 67
End: 2022-05-26
Payer: MEDICARE

## 2022-05-26 ENCOUNTER — DOCUMENTATION (OUTPATIENT)
Dept: MEDICAL GROUP | Age: 67
End: 2022-05-26
Payer: MEDICARE

## 2022-05-26 LAB — 25(OH)D3 SERPL-MCNC: 55 NG/ML (ref 30–80)

## 2022-05-26 NOTE — PATIENT COMMUNICATION
1. Caller Name: Lidia Fitzpatrick                          Call Back Number: [phone] (home)         How would the patient prefer to be contacted with a response: Qoizat message    2. SPECIFIC Action To Be Taken: Referral pending, please sign.    3. Diagnosis/Clinical Reason for Request: Hypothyroidism, acquired, autoimmune    4. Specialty & Provider Name/Lab/Imaging Location:   [facility]    5. Is appointment scheduled for requested order/referral: no    Patient was not informed they will receive a return phone call from the office ONLY if there are any questions before processing their request. Advised to call back if they haven't received a call from the referral department in 5 days.
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
## 2022-06-16 ENCOUNTER — PATIENT MESSAGE (OUTPATIENT)
Dept: MEDICAL GROUP | Age: 67
End: 2022-06-16
Payer: MEDICARE

## 2022-07-08 ENCOUNTER — OFFICE VISIT (OUTPATIENT)
Dept: ENDOCRINOLOGY | Facility: MEDICAL CENTER | Age: 67
End: 2022-07-08
Payer: MEDICARE

## 2022-07-08 VITALS
HEIGHT: 63 IN | OXYGEN SATURATION: 90 % | WEIGHT: 113 LBS | HEART RATE: 85 BPM | SYSTOLIC BLOOD PRESSURE: 110 MMHG | DIASTOLIC BLOOD PRESSURE: 72 MMHG | BODY MASS INDEX: 20.02 KG/M2

## 2022-07-08 DIAGNOSIS — E06.3 HYPOTHYROIDISM, ACQUIRED, AUTOIMMUNE: ICD-10-CM

## 2022-07-08 DIAGNOSIS — E55.9 VITAMIN D DEFICIENCY: ICD-10-CM

## 2022-07-08 DIAGNOSIS — M81.0 POSTMENOPAUSAL OSTEOPOROSIS: ICD-10-CM

## 2022-07-08 PROCEDURE — 99211 OFF/OP EST MAY X REQ PHY/QHP: CPT

## 2022-07-08 PROCEDURE — 99214 OFFICE O/P EST MOD 30 MIN: CPT

## 2022-07-08 RX ORDER — LEVOTHYROXINE SODIUM 0.07 MG/1
75 TABLET ORAL
Qty: 90 TABLET | Refills: 1 | Status: SHIPPED | OUTPATIENT
Start: 2022-07-08 | End: 2022-10-31 | Stop reason: SDUPTHER

## 2022-07-08 ASSESSMENT — FIBROSIS 4 INDEX: FIB4 SCORE: 1.34

## 2022-07-08 NOTE — PROGRESS NOTES
Chief Complaint: Consult requested by Deana Chao M.D. for evaluation of the following conditions  HPI:   1. Hypothyroidism, acquired:   Lidia Fitzpatrick is a 66 y.o. female      She denies fatigue, weight changes, heat/cold intolerance, bowel/skin changes or CVS symptoms    She denies lumps or enlargement in the neck.    She reports a family history of Hypothyroidism with her aunt.       She is currently on Levothyroxine 100 mcg daily  She denies any recent dose changes.   She reports Good compliance and takes her thyroid hormone daily before breakfast.    She denies taking any iron, or antacids.   Reports taking calcium in the evening with dinner  Denies taking biotin    She was referred to primary care and her last appointment establish care, she was seen back in May-however her thyroid hormone levels at uncontrolled and she is here today  Ultrasound was ordered on last appointment but patient did not schedule ultrasound        Latest Reference Range & Units 05/24/22 13:16   TSH 0.380 - 5.330 uIU/mL 0.009 (L)   Free T-4 0.93 - 1.70 ng/dL 2.02 (H)       2. Postmenopausal Osteoporosis:  She exercises every day by hiking when the weather permits  She takes calcium and vitamin D daily, she does not recall the dosages at this time    Dexa scan on 7/20/2021 showed: Lumbar T score of -2.8, left femur T score -2.8  Dexa scan on 7/5/2019 showed: Lumbar T score -2.3, left femur T score -2.4  Dexa scan on 5/12/2017 showed: Lumbar T score -2.2, left femur T score -2.1    3.  Vitamin D deficiency:  Currently taking everyday   Ref. Range 5/12/2021 15:09   25-Hydroxy   Vitamin D 25 Latest Ref Range: 30 - 80 ng/mL 56     Patient's medications, allergies, and social histories were reviewed and updated as appropriate.      ROS:     CONS:     No fever, no chills, no weight loss, no fatigue   EYES:      No diplopia, no blurry vision, no redness of eyes, no swelling of eyelids   ENT:    No hearing loss, No ear pain, No sore throat,  no dysphagia, no neck swelling   CV:     No chest pain, no palpitations, no claudication, no orthopnea, no PND   PULM:    No SOB, no cough, no hemoptysis, no wheezing    GI:   No nausea, no vomiting, no diarrhea, no constipation, no bloody stools   :  Passing urine well, no dysuria, no hematuria   ENDO:   No polyuria, no polydipsia, no heat intolerance, no cold intolerance   NEURO: No headaches, no dizziness, no convulsions, no tremors   MUSC:  No joint swellings, no arthralgias, no myalgias, no weakness   SKIN:   No rash, no ulcers, no dry skin   PSYCH:   No depression, no anxiety, no difficulty sleeping       Past Medical History:  Patient Active Problem List    Diagnosis Date Noted   • Vegetarian diet 05/23/2022   • Blood donor 12/27/2021   • Cognitive deficits 09/09/2021   • Body mass index (BMI) of 19.0 to 19.9 in adult 09/09/2021   • Continuous leakage of urine 05/18/2021   • Impaired fasting blood sugar 05/01/2020   • Dyslipidemia    • Atrophic vaginitis 03/09/2018   • S/P MITCH-BSO 03/17/2017   • Age-related osteoporosis without current pathological fracture_[facility]    • Hypothyroidism_[facility] 07/06/2010       Past Surgical History:  Past Surgical History:   Procedure Laterality Date   • CARPAL TUNNEL ENDOSCOPIC Right 11/29/2017    Procedure: CARPAL TUNNEL ENDOSCOPIC;  Surgeon: Raheem Ennis M.D.;  Location: [facility];  Service: Orthopedics   • HYSTERECTOMY ROBOTIC  11/21/2016    Procedure: HYSTERECTOMY ROBOTIC SUPRA CERVICAL WITH BILATERAL SALPINGO-OOPHORECTOMY;  Surgeon: Columba Estrada M.D.;  Location: [facility];  Service:    • COLPOSACROPEXY ROBOTIC  11/21/2016    Procedure: COLPOSACROPEXY ROBOTIC;  Surgeon: Columba Estrada M.D.;  Location: [facility];  Service:    • CYSTOSCOPY  11/21/2016    Procedure: CYSTOSCOPY;  Surgeon: Columba Estrada M.D.;  Location: [facility];  Service:    • VAGINAL PERINEAL EXAM REPAIR  11/21/2016     Procedure: VAGINAL PERINEAL EXAM REPAIR FOR PERINEORRHAPHY, LEVATORPLASTY;  Surgeon: Cloumba Estrada M.D.;  Location: SUR[facility];  Service:    • ABDOMINAL HYSTERECTOMY TOTAL     • BLADDER SUSPENSION     • TUBAL COAGULATION LAPAROSCOPIC BILATERAL          Allergies:  Morphine     Current Medications:    Current Outpatient Medications:   •  Misc Natural Products (LUTEIN 20 PO), Take 20 mg by mouth every day., Disp: , Rfl:   •  vitamin k 100 MCG tablet, Take 100 mcg by mouth every day., Disp: , Rfl:   •  Cholecalciferol (VITAMIN D3 PO), Take  by mouth., Disp: , Rfl:   •  levothyroxine (SYNTHROID) 100 MCG Tab, Take 1 Tablet by mouth every morning on an empty stomach., Disp: 150 Tablet, Rfl: 1  •  multivitamin (THERAGRAN) Tab, Take 1 Tab by mouth every day., Disp: , Rfl:   •  Calcium Citrate-Vitamin D (CALCIUM + D PO), Take 1 Tab by mouth every day., Disp: , Rfl:   •  Ginkgo Biloba 40 MG Tab, Take 1 Tab by mouth every day., Disp: , Rfl:   •  b complex vitamins tablet, Take 1 Tab by mouth every day., Disp: , Rfl:     Social History:  Social History     Socioeconomic History   • Marital status:      Spouse name: Not on file   • Number of children: Not on file   • Years of education: Not on file   • Highest education level: Not on file   Occupational History   • Not on file   Tobacco Use   • Smoking status: Never Smoker   • Smokeless tobacco: Never Used   Vaping Use   • Vaping Use: Never used   Substance and Sexual Activity   • Alcohol use: No   • Drug use: No   • Sexual activity: Not Currently     Partners: Male     Birth control/protection: Post-Menopausal   Other Topics Concern   • Not on file   Social History Narrative   • Not on file     Social Determinants of Health     Financial Resource Strain: Not on file   Food Insecurity: Not on file   Transportation Needs: Not on file   Physical Activity: Not on file   Stress: Not on file   Social Connections: Not on file   Intimate Partner Violence:  "Not on file   Housing Stability: Not on file        Family History:   Family History   Problem Relation Age of Onset   • Hyperlipidemia Mother    • Hyperlipidemia Father    • Heart Disease Father         PCI   • Stroke Maternal Grandmother 87   • Cancer Paternal Grandfather         Stomach   • Cancer Maternal Aunt 62        Breast   • Cancer Paternal Uncle         digestive, unknown type   • Psychiatric Illness Brother          PHYSICAL EXAM:   Vital signs: /72 (BP Location: Left arm, Patient Position: Sitting, BP Cuff Size: Adult)   Pulse 85   Ht 1.6 m (5' 3\")   Wt 51.3 kg (113 lb)   SpO2 90%   BMI 20.02 kg/m²   GENERAL: Well-developed, well-nourished  in no apparent distress.   EYE: No ocular and eyelid asymmetry, Anicteric sclerae,  PERRL  HENT: Hearing grossly intact, Normocephalic, atraumatic.   NECK: Supple. Trachea midline. thyroid is normal in size without nodules or tenderness  CARDIOVASCULAR: Regular rate and rhythm. No murmurs, rubs, or gallops.   LUNGS: Clear to auscultation bilaterally   EXTREMITIES: No clubbing, cyanosis, or edema.   NEUROLOGICAL: Cranial nerves II-XII are grossly intact   Symmetric reflexes at the patella no proximal muscle weakness  LYMPH: No cervical, supraclavicular,  adenopathy palpated.   SKIN: No rashes, lesions. Turgor is normal.    Labs:  Lab Results   Component Value Date/Time    WBC 6.9 05/24/2022 01:19 PM    RBC 4.11 (L) 05/24/2022 01:19 PM    HEMOGLOBIN 12.4 05/24/2022 01:19 PM    MCV 90.8 05/24/2022 01:19 PM    MCH 30.2 05/24/2022 01:19 PM    MCHC 33.2 (L) 05/24/2022 01:19 PM    RDW 42.2 05/24/2022 01:19 PM    MPV 9.4 05/24/2022 01:19 PM       Lab Results   Component Value Date/Time    SODIUM 138 05/24/2022 01:19 PM    POTASSIUM 4.1 05/24/2022 01:19 PM    CHLORIDE 102 05/24/2022 01:19 PM    CO2 26 05/24/2022 01:19 PM    ANION 10.0 05/24/2022 01:19 PM    GLUCOSE 104 (H) 05/24/2022 01:19 PM    BUN 23 (H) 05/24/2022 01:19 PM    CREATININE 0.83 05/24/2022 01:19 PM "    CALCIUM 9.3 05/24/2022 01:19 PM    ASTSGOT 19 05/24/2022 01:19 PM    ALTSGPT 12 05/24/2022 01:19 PM    TBILIRUBIN 0.6 05/24/2022 01:19 PM    ALBUMIN 4.1 05/24/2022 01:19 PM    TOTPROTEIN 7.3 05/24/2022 01:19 PM    GLOBULIN 3.2 05/24/2022 01:19 PM    AGRATIO 1.3 05/24/2022 01:19 PM       Lab Results   Component Value Date/Time    CHOLSTRLTOT 154 08/02/2021 1029    TRIGLYCERIDE 54 08/02/2021 1029    HDL 65 08/02/2021 1029    LDL 78 08/02/2021 1029       Lab Results   Component Value Date/Time    TSHULTRASEN 2.400 05/12/2021 1509     Lab Results   Component Value Date/Time    FREET4 1.46 05/12/2021 1509     Lab Results   Component Value Date/Time    FREET3 3.31 07/09/2016 0958     No results found for: THYSTIMIG    Lab Results   Component Value Date/Time    MICROSOMALA 1.1 07/09/2016 0958         Imaging:      ASSESSMENT/PLAN:   1. Hypothyroidism, acquired, autoimmune  Clinically stable  Biochemically unstable    We discussed pathophysiology of hypothyroidism, treatment methods, risks and benefits of too much thyroid replacement or not enough thyroid replacement    At this time it is my recommendation to decrease her thyroid hormone medication to avoid cardiovascular disease complications and/or worsening of her osteoporosis    Please  the following prescription to your pharmacy today  - levothyroxine (SYNTHROID) 75 MCG Tab; Take 1 Tablet by mouth every morning on an empty stomach.  Dispense: 90 Tablet; Refill: 1    - TSH; Future  - FREE THYROXINE; Future  - Comp Metabolic Panel; Future    2. Postmenopausal osteoporosis  Unstable  Patient refused treatment at this time    We discussed rationale for treating osteoporosis, risks and benefits of not treating osteoporosis-patient still feels strongly to decline treatment    3. Vitamin D deficiency  Stable  Continue regimen  - VITAMIN D,25 HYDROXY; Future      Disposition: Make an appointment to follow-up in 2 months  Do your blood work before your next  appointment  Patient is being treated at this office for hypothyroidism management, she refuses treatment for osteoporosis-once thyroid hormone levels at stable we can transfer care to primary care      Thank you kindly for allowing me to participate in the thyroid care plan for this patient.    [email or web address] [email or web address].  07/08/2022    CC:   Deana Chao M.D.

## 2022-07-11 ENCOUNTER — OFFICE VISIT (OUTPATIENT)
Dept: MEDICAL GROUP | Age: 67
End: 2022-07-11
Payer: MEDICARE

## 2022-07-11 VITALS
HEIGHT: 63 IN | BODY MASS INDEX: 19.84 KG/M2 | WEIGHT: 112 LBS | DIASTOLIC BLOOD PRESSURE: 60 MMHG | SYSTOLIC BLOOD PRESSURE: 120 MMHG | TEMPERATURE: 96.5 F | OXYGEN SATURATION: 96 % | HEART RATE: 63 BPM

## 2022-07-11 DIAGNOSIS — F39 UNSPECIFIED MOOD (AFFECTIVE) DISORDER (HCC): ICD-10-CM

## 2022-07-11 DIAGNOSIS — R41.89 COGNITIVE DEFICITS: ICD-10-CM

## 2022-07-11 DIAGNOSIS — Z12.31 ENCOUNTER FOR SCREENING MAMMOGRAM FOR BREAST CANCER: ICD-10-CM

## 2022-07-11 DIAGNOSIS — Z65.9 OTHER SOCIAL STRESSOR: ICD-10-CM

## 2022-07-11 PROCEDURE — 99214 OFFICE O/P EST MOD 30 MIN: CPT | Performed by: FAMILY MEDICINE

## 2022-07-11 ASSESSMENT — FIBROSIS 4 INDEX: FIB4 SCORE: 1.34

## 2022-07-13 PROBLEM — R79.0 LOW FERRITIN: Status: ACTIVE | Noted: 2022-07-13

## 2022-07-13 NOTE — PROGRESS NOTES
Subjective:   CC: cognitive changes    HPI:     Lidia Fitzpatrick is a 67 y.o. female, established patient of the clinic.     Patient was seen several times for memory/cognitive changes noticed by family members. She scores 18/30 on MOCA test at previous office visit. She was referred to neurology for consultation, but appointment is in 10/2022. She presents today with her son who works for [employer]. He is interested in referral to [facility] for faster appointment. Patient and her son reports of social stressors that might affect her mental health, which might contribute to ongoing concerns with memory/cognitive issues. Patient is not willing to discuss details of social stressor, but, according to her son, there is relationship problems with her . One of her son was trying to commit suicide in the past which affected her mental emily and continues to bother patient. However, this has never been address.     Current medicines (including changes today)  Current Outpatient Medications   Medication Sig Dispense Refill   • levothyroxine (SYNTHROID) 75 MCG Tab Take 1 Tablet by mouth every morning on an empty stomach. 90 Tablet 1   • Misc Natural Products (LUTEIN 20 PO) Take 20 mg by mouth every day.     • vitamin k 100 MCG tablet Take 100 mcg by mouth every day.     • Cholecalciferol (VITAMIN D3 PO) Take  by mouth.     • multivitamin (THERAGRAN) Tab Take 1 Tab by mouth every day.     • Calcium Citrate-Vitamin D (CALCIUM + D PO) Take 1 Tab by mouth every day.     • Ginkgo Biloba 40 MG Tab Take 1 Tab by mouth every day.     • b complex vitamins tablet Take 1 Tab by mouth every day.       No current facility-administered medications for this visit.     She  has a past medical history of Anxiety, CATARACT, Chronic thyroiditis, Depression, Dyslipidemia, Gynecological disorder, Hypothyroidism, Keratoconjunctivitis sicca, multiple thyroid nodules, Osteopenia (2005), S/P hysterectomy with oophorectomy (2016),  "Urinary incontinence (11/27/2017), and Urinary tract infection, site not specified.    She has no past medical history of Addisons disease (HCC), Adrenal disorder, Allergy, Anemia, Arthritis, Blood transfusion, Clotting disorder, COPD, Cushings syndrome, GERD (gastroesophageal reflux disease), Goiter, Headache(784.0), HIV (human immunodeficiency virus infection), IBD (inflammatory bowel disease), Meningitis, Migraine, Muscle disorder, Parathyroid disorder (HCC), Pituitary disease (HCC), Substance abuse (HCC), or Ulcer.    I reviewed patient's problem list, allergies, medications, family hx, social hx with patient and update EPIC.        Objective:     /60 (BP Location: Right arm, Patient Position: Sitting, BP Cuff Size: Adult)   Pulse 63   Temp 35.8 °C (96.5 °F) (Temporal)   Ht 1.6 m (5' 3\")   Wt 50.8 kg (112 lb)   SpO2 96%  Body mass index is 19.84 kg/m².    Physical Exam:  Constitutional: awake, alert, in no distress.  Skin: Warm, dry, good turgor, no rashes, bruises, ulcers in visible areas.  Eye: conjunctiva clear, lids neg for edema or lesions.  Neck: Trachea midline, no masses, no thyromegaly. No cervical or supraclavicular lymphadenopathy  Respiratory: Unlabored respiratory effort, lungs clear to auscultation, no wheezes, no rales.  Cardiovascular: Normal S1, S2, no murmur, no pedal edema.  Psych: Oriented x3, indifferent affect, mood is calm, patient repetitively and involuntarily discuss information/topics that were unrelated to the conversation.        Assessment and Plan:   The following treatment plan was discussed    1. Cognitive deficits  2. Other social stressor  3. Unspecified mood (affective) disorder (HCC)  66 yo female, overall healthy, no major medical or surgical history. She has hypothyroidism, osteoporosis which have been managed by endocrinology. She also has mild hyperlipidemia, impaired fasting blood sugar. Patient is trying to work on dietary and lifestyle modification to " "control these conditions. Family has been concerned about her memory and cognitive changes since 2021. At the time, her son tried to set up Psych evaluation but it did not happen for unclear reason. She had Minicog evaluation done by [facility] in 9/2021 and did poorly on the test. She was seen by myself on 5/23/2022. She scored 18/30 on MOCA test. She was referred to neurology for consultation and has appointment with neurology on 10/11/2022. She presents with her son today and expresses her wish to be referred to different neuropsych service for sooner appointment. After long discussion with patient and her son, they agreed to be referred for neuropsychiatric testing. There is also concerned for occult depression which might interfere with her cognition. Physically, patient remains active. She tries to exercise regularly. She is vegetarian and takes over-the-counter supplements as directed. Her most recent labs showed normal CBC, CMP, B12, Folic acid and low normal ferritin. I recommended referral to nutritional service for counseling, but patient declined. Patient denies history of head trauma, concussion, MVA, neurological symptoms. She did mentioned that her mother failed \"memory test\" in the past, but no known family history of dementia or neurodegenerative diseases.   - Referral to Behavioral Health: Neuropsych test.   - keep appointment to follow up with neurology in 10/2022  - follow up in 3 months.     4. Encounter for screening mammogram for breast cancer  - MA-SCREENING MAMMO BILAT W/CAD; Future        Ly KIEL Chao M.D.      Followup: Return in about 3 months (around 10/11/2022) for As needed.    Please note that this dictation was created using voice recognition software. I have made every reasonable attempt to correct obvious errors, but I expect that there are errors of grammar and possibly content that I did not discover before finalizing the note.           "

## 2022-07-23 ENCOUNTER — OFFICE VISIT (OUTPATIENT)
Dept: URGENT CARE | Facility: CLINIC | Age: 67
End: 2022-07-23
Payer: MEDICARE

## 2022-07-23 VITALS
WEIGHT: 109 LBS | BODY MASS INDEX: 19.31 KG/M2 | HEIGHT: 63 IN | DIASTOLIC BLOOD PRESSURE: 74 MMHG | RESPIRATION RATE: 16 BRPM | HEART RATE: 79 BPM | TEMPERATURE: 98 F | OXYGEN SATURATION: 98 % | SYSTOLIC BLOOD PRESSURE: 112 MMHG

## 2022-07-23 DIAGNOSIS — J06.9 VIRAL URI WITH COUGH: ICD-10-CM

## 2022-07-23 LAB
EXTERNAL QUALITY CONTROL: NORMAL
SARS-COV+SARS-COV-2 AG RESP QL IA.RAPID: NEGATIVE

## 2022-07-23 PROCEDURE — 87426 SARSCOV CORONAVIRUS AG IA: CPT | Performed by: NURSE PRACTITIONER

## 2022-07-23 PROCEDURE — 99213 OFFICE O/P EST LOW 20 MIN: CPT | Mod: CS | Performed by: NURSE PRACTITIONER

## 2022-07-23 ASSESSMENT — ENCOUNTER SYMPTOMS
WEAKNESS: 0
SORE THROAT: 1
CHILLS: 0
CONSTITUTIONAL NEGATIVE: 1
FEVER: 0
COUGH: 1
HEADACHES: 1
SENSORY CHANGE: 0
SHORTNESS OF BREATH: 0

## 2022-07-23 ASSESSMENT — VISUAL ACUITY: OU: 1

## 2022-07-23 ASSESSMENT — FIBROSIS 4 INDEX: FIB4 SCORE: 1.34

## 2022-07-23 NOTE — PROGRESS NOTES
Subjective:     Lidia Fitzpatrick is a 67 y.o. female who presents for Cough (X3 days ), Runny Nose, Sore Throat, and Headache       Cough  This is a new problem. Episode onset: Thursday. The problem has been gradually worsening. Associated symptoms include headaches and a sore throat. Pertinent negatives include no chills, fever or shortness of breath.   Headache    Recently went to a festival.  Otherwise, no specific sick contact.    Review of Systems   Constitutional: Negative.  Negative for chills, fever and malaise/fatigue.   HENT: Positive for congestion and sore throat.    Respiratory: Positive for cough. Negative for shortness of breath.    Neurological: Positive for headaches. Negative for sensory change and weakness.   All other systems reviewed and are negative.    Refer to HPI for additional details.    During this visit, appropriate PPE was worn, hand hygiene was performed, and the patient and any visitors were masked.    PMH:  has a past medical history of Anxiety, CATARACT, Chronic thyroiditis, Depression, Dyslipidemia, Gynecological disorder, Hypothyroidism, Keratoconjunctivitis sicca, multiple thyroid nodules, Osteopenia (2005), S/P hysterectomy with oophorectomy (2016), Urinary incontinence (11/27/2017), and Urinary tract infection, site not specified.    She has no past medical history of Addisons disease (HCC), Adrenal disorder, Allergy, Anemia, Arthritis, Blood transfusion, Clotting disorder, COPD, Cushings syndrome, GERD (gastroesophageal reflux disease), Goiter, Headache(784.0), HIV (human immunodeficiency virus infection), IBD (inflammatory bowel disease), Meningitis, Migraine, Muscle disorder, Parathyroid disorder ([facility]), Pituitary disease ([facility]), Substance abuse ([facility]), or Ulcer.    MEDS:   Current Outpatient Medications:   •  levothyroxine (SYNTHROID) 75 MCG Tab, Take 1 Tablet by mouth every morning on an empty stomach., Disp: 90 Tablet, Rfl: 1  •  Misc Natural Products (LUTEIN 20 PO), Take 20  "mg by mouth every day., Disp: , Rfl:   •  vitamin k 100 MCG tablet, Take 100 mcg by mouth every day., Disp: , Rfl:   •  Cholecalciferol (VITAMIN D3 PO), Take  by mouth., Disp: , Rfl:   •  multivitamin (THERAGRAN) Tab, Take 1 Tab by mouth every day., Disp: , Rfl:   •  Calcium Citrate-Vitamin D (CALCIUM + D PO), Take 1 Tab by mouth every day., Disp: , Rfl:   •  Ginkgo Biloba 40 MG Tab, Take 1 Tab by mouth every day., Disp: , Rfl:   •  b complex vitamins tablet, Take 1 Tab by mouth every day., Disp: , Rfl:     ALLERGIES:   Allergies   Allergen Reactions   • Morphine Unspecified     Hallucinations  RXN=24 years ago; patient reports \"sensitive to Morphine\" not a true allergy     SURGHX:   Past Surgical History:   Procedure Laterality Date   • CARPAL TUNNEL ENDOSCOPIC Right 11/29/2017    Procedure: CARPAL TUNNEL ENDOSCOPIC;  Surgeon: Raheem Ennis M.D.;  Location: [facility];  Service: Orthopedics   • HYSTERECTOMY ROBOTIC  11/21/2016    Procedure: HYSTERECTOMY ROBOTIC SUPRA CERVICAL WITH BILATERAL SALPINGO-OOPHORECTOMY;  Surgeon: Columba Estrada M.D.;  Location: [facility];  Service:    • COLPOSACROPEXY ROBOTIC  11/21/2016    Procedure: COLPOSACROPEXY ROBOTIC;  Surgeon: Columba Estrada M.D.;  Location: [facility];  Service:    • CYSTOSCOPY  11/21/2016    Procedure: CYSTOSCOPY;  Surgeon: Columba Estrada M.D.;  Location: [facility];  Service:    • VAGINAL PERINEAL EXAM REPAIR  11/21/2016    Procedure: VAGINAL PERINEAL EXAM REPAIR FOR PERINEORRHAPHY, LEVATORPLASTY;  Surgeon: Columba Estrada M.D.;  Location: [facility];  Service:    • ABDOMINAL HYSTERECTOMY TOTAL     • BLADDER SUSPENSION     • TUBAL COAGULATION LAPAROSCOPIC BILATERAL       SOCHX:  reports that she has never smoked. She has never used smokeless tobacco. She reports that she does not drink alcohol and does not use drugs.    FH: Per HPI as " "applicable/pertinent.      Objective:     /74   Pulse 79   Temp 36.7 °C (98 °F) (Temporal)   Resp 16   Ht 1.6 m (5' 3\")   Wt 49.4 kg (109 lb)   SpO2 98%   Breastfeeding No   BMI 19.31 kg/m²     Physical Exam  Nursing note reviewed.   Constitutional:       General: She is not in acute distress.     Appearance: She is well-developed. She is not ill-appearing or toxic-appearing.   HENT:      Mouth/Throat:      Mouth: Mucous membranes are moist.      Pharynx: Oropharynx is clear. No posterior oropharyngeal erythema.   Eyes:      General: Vision grossly intact.      Extraocular Movements: Extraocular movements intact.   Cardiovascular:      Rate and Rhythm: Normal rate and regular rhythm.      Heart sounds: Normal heart sounds.   Pulmonary:      Effort: Pulmonary effort is normal. No respiratory distress.      Breath sounds: Normal breath sounds. No decreased breath sounds, wheezing, rhonchi or rales.   Musculoskeletal:         General: No deformity. Normal range of motion.      Cervical back: Normal range of motion.   Skin:     General: Skin is warm and dry.      Coloration: Skin is not pale.   Neurological:      Mental Status: She is alert and oriented to person, place, and time.      Motor: No weakness.   Psychiatric:         Behavior: Behavior normal. Behavior is cooperative.     POCT Covid: negative      Assessment/Plan:     1. Viral URI with cough  - POCT SARS-COV Antigen [facility] Manual Result    Discussed likely self-limiting viral etiology and expected course and duration of illness. Vital signs stable, afebrile, no acute distress at this time.     Differential diagnosis, natural history, supportive care, rest, fluids, over-the-counter symptom management per 's instructions, close monitoring, and indications for immediate follow-up discussed.     OTC list given.    All questions answered. Patient agrees with the plan of care.    Discharge summary provided.   "

## 2022-08-09 DIAGNOSIS — F33.1 MODERATE EPISODE OF RECURRENT MAJOR DEPRESSIVE DISORDER (HCC): ICD-10-CM

## 2022-08-18 DIAGNOSIS — E03.4 HYPOTHYROIDISM DUE TO ACQUIRED ATROPHY OF THYROID: ICD-10-CM

## 2022-08-18 RX ORDER — LEVOTHYROXINE SODIUM 0.1 MG/1
TABLET ORAL
Qty: 90 TABLET | Refills: 1 | Status: SHIPPED | OUTPATIENT
Start: 2022-08-18 | End: 2022-10-24

## 2022-10-11 ENCOUNTER — OFFICE VISIT (OUTPATIENT)
Dept: NEUROLOGY | Facility: MEDICAL CENTER | Age: 67
End: 2022-10-11
Attending: PSYCHIATRY & NEUROLOGY
Payer: MEDICARE

## 2022-10-11 VITALS
OXYGEN SATURATION: 98 % | DIASTOLIC BLOOD PRESSURE: 82 MMHG | WEIGHT: 113.1 LBS | SYSTOLIC BLOOD PRESSURE: 118 MMHG | TEMPERATURE: 98.3 F | BODY MASS INDEX: 20.04 KG/M2 | HEIGHT: 63 IN | HEART RATE: 72 BPM

## 2022-10-11 DIAGNOSIS — E03.8 OTHER SPECIFIED HYPOTHYROIDISM: ICD-10-CM

## 2022-10-11 DIAGNOSIS — G30.1 MILD LATE ONSET ALZHEIMER'S DEMENTIA, UNSPECIFIED WHETHER BEHAVIORAL, PSYCHOTIC, OR MOOD DISTURBANCE OR ANXIETY (HCC): Primary | ICD-10-CM

## 2022-10-11 DIAGNOSIS — F02.A0 MILD LATE ONSET ALZHEIMER'S DEMENTIA, UNSPECIFIED WHETHER BEHAVIORAL, PSYCHOTIC, OR MOOD DISTURBANCE OR ANXIETY (HCC): Primary | ICD-10-CM

## 2022-10-11 DIAGNOSIS — F03.A0 MILD DEMENTIA, UNSPECIFIED DEMENTIA TYPE, UNSPECIFIED WHETHER BEHAVIORAL, PSYCHOTIC, OR MOOD DISTURBANCE OR ANXIETY (HCC): ICD-10-CM

## 2022-10-11 PROCEDURE — 99212 OFFICE O/P EST SF 10 MIN: CPT | Performed by: PSYCHIATRY & NEUROLOGY

## 2022-10-11 PROCEDURE — 99205 OFFICE O/P NEW HI 60 MIN: CPT | Performed by: PSYCHIATRY & NEUROLOGY

## 2022-10-11 ASSESSMENT — MONTREAL COGNITIVE ASSESSMENT (MOCA)
5. MEMORY TRIALS: SECOND TRIAL
4. NAME EACH OF THE THREE ANIMALS SHOWN: 0/3
DELAYED RECALL SUBSCORE: 0/5
9. REPEAT EACH SENTENCE: 1/2
10. [FLUENCY] NAME WORDS STARTING WITH DESIGNATED LETTER: 0/1
ORIENTATION SUBSCORE: 6/6
WHAT IS THE VERSION OF MOCA ADMINISTERED: 8.3
3. DRAW A CLOCK: CONTOUR, NUMBERS, HANDS: 1/3
CATEGORY CUE (IF APPLICABLE): 3
6. READ LIST OF DIGITS [FORWARD/BACKWARD]: 2/2
8. SERIAL SUBTRACTION OF 7S: 2 OR 3/5
WHAT IS THE TOTAL SCORE (OUT OF 30): 12
2. COPY DRAWING: 0/1
1. ALTERNATING TRAIL MAKING: 0/1
11. FOR EACH PAIR OF WORDS, WHAT CATEGORY DO THEY BELONG TO (OUT OF 2): 0/2
7. [VIGILENCE] TAP WHEN HEARING DESIGNATED LETTER: 0/1

## 2022-10-11 ASSESSMENT — FIBROSIS 4 INDEX: FIB4 SCORE: 1.34

## 2022-10-11 ASSESSMENT — PATIENT HEALTH QUESTIONNAIRE - PHQ9: CLINICAL INTERPRETATION OF PHQ2 SCORE: 0

## 2022-10-11 NOTE — PROGRESS NOTES
Reason for Neurology Consult:  Concern for Dementia    History of present illness:    Lidia Fitzpatrick 67 y.o. right handed woman who is with her 2 sons.  Lives with .  She was a  and in benefits ([employer]).  She left her job in  2022.    When asked directly about her memory issues she adamantly denies any memories issues at all in the recent months.    About 3 years ago there was a subtle changes in Xena's memory and not as present and infrequently  repeating self.  She will repeat self within the same conversation (usually 1-2 times) and this has occurring in the last 12 months.    Much harder to maintain attention and slowed and slowed thought processing for 1-2 years (often taking pause and responding). Tending to have more superficial thoughts than deeper one.    She continues to walk on the same trails without any karen episodes of getting lost.    Stopped driving until about 2 weeks ago> she was in a [employer] and was on a [place].    No involuntary movements in the recent months.    No paranoia,delusions,hallucinations noticed in the last 3-6 months.    No shuffling or subacute gait/balance decline in the last 6 months.    No falls or near falls in the last 3 months.    No ongoing pains of the head,neck or limbs.    No history of seizure type events, stroke type events or episodes, karen significant concussion(s)        Father: late 80's (cognitive issues)>  at age 88      Patient Active Problem List    Diagnosis Date Noted    Low ferritin 2022    Vegetarian diet 2022    Blood donor 2021    Cognitive deficits 2021    Body mass index (BMI) of 19.0 to 19.9 in adult 2021    Continuous leakage of urine_declined treatment 2021    Impaired fasting blood sugar 2020    Atrophic vaginitis 2018    S/P MITCH-BSO 2017    Depression 2011    Age-related osteoporosis without current pathological fracture_[facility]      Hypothyroidism_[facility] 07/06/2010       Past medical history:   Past Medical History:   Diagnosis Date    Anxiety     CATARACT     megan IOL    Chronic thyroiditis      + US / neg ab    Depression     mood swings    Dyslipidemia     Gynecological disorder     Hypothyroidism     Keratoconjunctivitis sicca     multiple thyroid nodules     subcentimeter / 2016 heterogeneous okay    Osteopenia 2005    S/P hysterectomy with oophorectomy 2016    prolapse.    Urinary incontinence 11/27/2017    Urinary tract infection, site not specified        Past surgical history:   Past Surgical History:   Procedure Laterality Date    CARPAL TUNNEL ENDOSCOPIC Right 11/29/2017    Procedure: CARPAL TUNNEL ENDOSCOPIC;  Surgeon: Raheem Ennis M.D.;  [place] [facility] [place]  Service: [place]opedics    HYSTERECTOMY ROBOTIC  11/21/2016    Procedure: HYSTERECTOMY ROBOTIC SUPRA CERVICAL WITH BILATERAL SALPINGO-OOPHORECTOMY;  Surgeon: Columba Estrada M.D.;  [place] [facility] [place]  Service:     COLPOSACROPEXY ROBOTIC  11/21/2016    Procedure: COLPOSACROPEXY ROBOTIC;  Surgeon: Columba Estrada M.D.;  [place] [facility] [place]  Service:     CYSTOSCOPY  11/21/2016    Procedure: CYSTOSCOPY;  Surgeon: Columba Estrada M.D.;  [place] [facility] [place]  Service:     VAGINAL PERINEAL EXAM REPAIR  11/21/2016    Procedure: VAGINAL PERINEAL EXAM REPAIR FOR PERINEORRHAPHY, LEVATORPLASTY;  Surgeon: Columba Estrada M.D.;  [place] [facility] [place]  Service:     ABDOMINAL HYSTERECTOMY TOTAL      BLADDER SUSPENSION      TUBAL COAGULATION LAPAROSCOPIC BILATERAL           Social history:   Social History     Socioeconomic History    Marital status:      Spouse name: Not on file    Number of children: Not on file    Years of education: Not on file    Highest education level: Not on file   Occupational History    Not on file   Tobacco Use    Smoking status: Never    Smokeless tobacco: Never  "  Vaping Use    Vaping Use: Never used   Substance and Sexual Activity    Alcohol use: No    Drug use: No    Sexual activity: Not Currently     Partners: Male     Birth control/protection: Post-Menopausal   Other Topics Concern    Not on file   Social History Narrative    Not on file     Social Determinants of Health     Financial Resource Strain: Not on file   Food Insecurity: Not on file   Transportation Needs: Not on file   Physical Activity: Not on file   Stress: Not on file   Social Connections: Not on file   Intimate Partner Violence: Not on file   Housing Stability: Not on file       Family history:   Family History   Problem Relation Age of Onset    Hyperlipidemia Mother     Hyperlipidemia Father     Heart Disease Father         PCI    Stroke Maternal Grandmother 87    Cancer Paternal Grandfather         Stomach    Cancer Maternal Aunt 62        Breast    Cancer Paternal Uncle         digestive, unknown type    Psychiatric Illness Brother          Current medications:   Current Outpatient Medications   Medication    levothyroxine (SYNTHROID) 100 MCG Tab    Misc Natural Products (LUTEIN 20 PO)    vitamin k 100 MCG tablet    Cholecalciferol (VITAMIN D3 PO)    multivitamin (THERAGRAN) Tab    Calcium Citrate-Vitamin D (CALCIUM + D PO)    Ginkgo Biloba 40 MG Tab    b complex vitamins tablet    levothyroxine (SYNTHROID) 75 MCG Tab     No current facility-administered medications for this visit.       Medication Allergy:  Allergies   Allergen Reactions    Morphine Unspecified     Hallucinations  RXN=24 years ago; patient reports \"sensitive to Morphine\" not a true allergy           Physical examination:   Vitals:    10/11/22 1505   BP: 118/82   BP Location: Right arm   Patient Position: Sitting   BP Cuff Size: Adult   Pulse: 72   Temp: 36.8 °C (98.3 °F)   TempSrc: Temporal   SpO2: 98%   Weight: 51.3 kg (113 lb 1.5 oz)   Height: 1.6 m (5' 3\")       Normal cephalic atraumatic.  There is full range of movement around " the neck in all directions without restrictions or discrete pain evoked triggers.  No lower extremity edema.      Neurological  Exam:      Daniel Cognitive Assessment (MOCA) Version 7.1    Years of Education: High School Grad    TOTAL SCORE: 14/30  (to be scanned into the MEDIA section in the E.M.R.)          Mental status: Awake, alert and fully oriented to person, place, time, and situation. Normal attention and concentration.  Did not appear/act combative,irritable,anxious,paranoid/delusional or aggressive to or with me.    Speech and language: Speech is fluent without errors, clear, intact to repetition, and intact to naming.     Follows 3 step motor commands in sequence without significant delay and correctly.    Cranial nerve exam:  II: Pupils are equally round and reactive to light. Visual fields are intact by confrontation.  III, IV, VI: EOMI, no diplopia, no ptosis.  V: Sensation to light touch is normal over V1-3 distributions bilaterally.  .  VII: Facial movements are symmetrical. There is no facial droop. .  VIII: Hearing intact to soft speech and finger rub bilaterally  IX: Palate elevates symmetrically, uvula is midline. Dysarthria is not present.  XI: Shoulder shrug are symmetrical and strong.   XII: Tongue protrudes midline.      Motor exam:  Muscle tone is normal in all 4 limbs. and No abnormal movements appreciated.    Muscle strength:    Neck Flexors/Extensors: 5/5       Right  Left  Deltoid   5/5  5/5      Biceps   5/5  5/5  Triceps  5/5  5/5   Wrist extensors 5/5  5/5  Wrist flexors  5/5  5/5     5/5  5/5  Interossei  5/5  5/5  Thenar (APB)  5/5  5/5   Hip flexors  5/5  5/5  Quadriceps  5/5  5/5    Hamstrings  5/5  5/5  Dorsiflexors  5/5  5/5  Plantarflexors  5/5  5/5  Toe extension  5/5  5/5          Reflexes:       Right  Left  Biceps   2/4  2/4  Triceps  2/4  2/4  Brachioradialis 2/4  2/4  Knee jerk  2/4  2/4  Ankle jerk  2/4  2/4     Frontal release signs are absent    bilaterally toes  "are downgoing to plantar stimulation..    Coordination (finger-to-nose, heel/knee/shin, rapid alternating movements) was normal.     There was no ataxia, no tremors, and no dysmetria.     Station and gait  Easily stands up from exam chair without retropulsion,veering,leaning,swaying (to either side).       Labs and Tests:     NEUROIMAGING: No recent Brain Imaging.      5/2022: B12: 1374; Folate: 28.1  TSH- 0.009   Free Thyroxine- 2.02     Impression/Plans/Recommendations:       Mild Dementia- likely neurodegenerative  and probable Alz Disease and with onset of memory and cognitive changes    Stephanie clearly has lack or loss of insight at this point.    No subacute features of a neuropsychiatric condition.    Not parkinsonian.    MOCA of 14/30 today.    Global Deterioration Scale Score in the 4 range (per sons).    FAQ scores from 10-22 range per sons.    Today, I reviewed the clinical criteria and reviewed several  scenarios of the differences being using the medical terms of normal brain aging (age associated memory impairment),  Mild Cognitive Impairment (MCI) and Dementia.    Dementia  is a syndrome but statistically and for the majority of patients  occurs due  to a more rapid aging of the brain tissue or potentially from injury to certain parts of one's brain ( often from such contributing factors as  the cumulative effects of alcohol, from one or more ischemic or hemmorhagic stroke(s), from neurodegeneration or long standing with/without suboptimally controlled Hypertension). It is for some of these potential factors as to why I recommend a brain imaging test (Head CT or Brain MRI) be done for the 1st time or in certain circumstances repeated for comparison purposes  as such imaging can suggest one or more factors as to the reason(s) for the person's cognitive/memory changes. In fact, a normal or \"age related\" finding on a brain imaging test in and of itself is useful clinical and objective information to have " in the evaluation of a person who has either an acute, evolving or even chronic (months to years) long cognitive/memory complaint.    Additional factors or contributors to Brain Health issues can be summarized in several books/references which I discussed as well today.     Goals going forwards include:    A. Paying attention to one's risk factors and reducing their prevalence or potential impact on one's changing memory/thinking> an excellent example would be to stop smoking, reduce or eliminate alcohol use (depending on the amount and frequency of usage), maintain good blood pressure control by buying a digital arm blood pressure cuff such as an OMRON Series 3 or 5 and checking one's blood pressure atleast 3 days per week (in the am and early afternoon) that the numbers are under 140/90 and working as needed with the primary care doctor  to optimize blood pressure control).    B. Encouraging proper sleep hygiene which for most adults is 7 to 8 hours of uninterrupted sleep per night.      C. Encouraging some form of exercise preferable aerobic forms to be done (4 to 5 days per week- 30 minutes minimum per day)> 150 minutes per week as a goal. Example activities could include fast walking (up a slight incline), jogging, cycling (road or stationary), swimming,tennis. Essentially, even basic walking on a flat surface or a treadmill would be better than doing nothing.    D. Maintaining or forming new social contacts with family and friends  and a positive attitude despite the concerns and/or ongoing issues with thinking and/or memory.    E. Eating well which means a diet low in salt  (under 2 grams per day), sugar and saturated fat.    F. Maintaining one's BMI (Body Mass Index) under 30.    G. Consideration of the use of cognitive enhancers (acetylcholinerase inhibitors such as Aricept vs an NMDA Receptor agent like Namenda). Pros and cons of such compounds in terms of predicted efficacy and side effects profiles  "reviewed.  At this time will hold off on such medication(s).       Today, I reviewed the clinical criteria and reviewed several  scenarios of the differences being using the medical terms of normal brain aging (age associated memory impairment),  Mild Cognitive Impairment (MCI) and Dementia.    Dementia  is a syndrome but statistically and for the majority of patients  occurs due  to a more rapid aging of the brain tissue or potentially from injury to certain parts of one's brain ( often from such contributing factors as  the cumulative effects of alcohol, from one or more ischemic or hemmorhagic stroke(s), from neurodegeneration or long standing with/without suboptimally controlled Hypertension). It is for some of these potential factors as to why I recommend a brain imaging test (Head CT or Brain MRI) be done for the 1st time or in certain circumstances repeated for comparison purposes  as such imaging can suggest one or more factors as to the reason(s) for the person's cognitive/memory changes. In fact, a normal or \"age related\" finding on a brain imaging test in and of itself is useful clinical and objective information to have in the evaluation of a person who has either an acute, evolving or even chronic (months to years) long cognitive/memory complaint.    Additional factors or contributors to Brain Health issues can be summarized in several books/references which I discussed as well today.     Goals going forwards include:    A. Paying attention to one's risk factors and reducing their prevalence or potential impact on one's changing memory/thinking> an excellent example would be to stop smoking, reduce or eliminate alcohol use (depending on the amount and frequency of usage), maintain good blood pressure control by buying a digital arm blood pressure cuff such as an OMRON Series 3 or 5 and checking one's blood pressure atleast 3 days per week (in the am and early afternoon) that the numbers are under 140/90 " and working as needed with the primary care doctor  to optimize blood pressure control).    B. Encouraging proper sleep hygiene which for most adults is 7 to 8 hours of uninterrupted sleep per night.      C. Encouraging some form of exercise preferable aerobic forms to be done (4 to 5 days per week- 30 minutes minimum per day)> 150 minutes per week as a goal. Example activities could include fast walking (up a slight incline), jogging, cycling (road or stationary), swimming,tennis. Essentially, even basic walking on a flat surface or a treadmill would be better than doing nothing.    D. Maintaining or forming new social contacts with family and friends  and a positive attitude despite the concerns and/or ongoing issues with thinking and/or memory.    E. Eating well which means a diet low in salt  (under 2 grams per day), sugar and saturated fat.    F. Maintaining one's BMI (Body Mass Index) under 30.    G. Consideration of the use of cognitive enhancers (acetylcholinerase inhibitors such as Aricept vs an NMDA Receptor agent like Namenda). Pros and cons of such compounds in terms of predicted efficacy and side effects profiles reviewed.       H. Dementia Friendly [place] Website and Alzheimer's Connect forms reviewed.    I. Recheck TSH with Free T4 and Vitamin B1, B12.      J. Brain MRI, Head CT and Brain PET all reviewed today- pros and cons reviewed of doing any of these tests and at this point will hold off on all of them after review with angel and Stephanie.      I have performed  a history and physical exam and a directed /focused  ROS today.    Total time spent today or this patient's care was  66 minutes and included reviewing  the diagnostic workup to date (such as labs and imaging as well as interpreting such tests relevant to this patient's neurological condition),  reviewing/obtaining separately obtained history (from patient and/or accompanying ffamily member)  for today's neurological problem(s) ,counseling  and educating the patient and family member on issues related to cognition/memory and cognitive health factors and documenting  the clinical information in the EMR.    Follow up at this point BRAYAN Huntley MD  [facility].   [facility]

## 2022-10-18 ENCOUNTER — HOSPITAL ENCOUNTER (EMERGENCY)
Facility: MEDICAL CENTER | Age: 67
End: 2022-10-18
Payer: MEDICARE

## 2022-10-18 ENCOUNTER — HOSPITAL ENCOUNTER (OUTPATIENT)
Dept: LAB | Facility: MEDICAL CENTER | Age: 67
End: 2022-10-18
Attending: PSYCHIATRY & NEUROLOGY
Payer: MEDICARE

## 2022-10-18 DIAGNOSIS — G30.1 MILD LATE ONSET ALZHEIMER'S DEMENTIA, UNSPECIFIED WHETHER BEHAVIORAL, PSYCHOTIC, OR MOOD DISTURBANCE OR ANXIETY (HCC): ICD-10-CM

## 2022-10-18 DIAGNOSIS — F02.A0 MILD LATE ONSET ALZHEIMER'S DEMENTIA, UNSPECIFIED WHETHER BEHAVIORAL, PSYCHOTIC, OR MOOD DISTURBANCE OR ANXIETY (HCC): ICD-10-CM

## 2022-10-18 LAB
FOLATE SERPL-MCNC: 23.6 NG/ML
T4 FREE SERPL-MCNC: 1.87 NG/DL (ref 0.93–1.7)
TSH SERPL DL<=0.005 MIU/L-ACNC: 0.01 UIU/ML (ref 0.38–5.33)
VIT B12 SERPL-MCNC: 801 PG/ML (ref 211–911)

## 2022-10-18 PROCEDURE — 84443 ASSAY THYROID STIM HORMONE: CPT

## 2022-10-18 PROCEDURE — 84439 ASSAY OF FREE THYROXINE: CPT

## 2022-10-18 PROCEDURE — 84425 ASSAY OF VITAMIN B-1: CPT

## 2022-10-18 PROCEDURE — 82746 ASSAY OF FOLIC ACID SERUM: CPT

## 2022-10-18 PROCEDURE — 82607 VITAMIN B-12: CPT

## 2022-10-18 PROCEDURE — 36415 COLL VENOUS BLD VENIPUNCTURE: CPT

## 2022-10-20 ENCOUNTER — PATIENT MESSAGE (OUTPATIENT)
Dept: MEDICAL GROUP | Age: 67
End: 2022-10-20
Payer: MEDICARE

## 2022-10-23 LAB — VIT B1 BLD-MCNC: 172 NMOL/L (ref 70–180)

## 2022-10-24 ENCOUNTER — TELEPHONE (OUTPATIENT)
Dept: ENDOCRINOLOGY | Facility: MEDICAL CENTER | Age: 67
End: 2022-10-24
Payer: MEDICARE

## 2022-10-24 NOTE — PROGRESS NOTES
Pt's primary care requested for me to call patient to clarify her thyroid medication.  Spoke to patient and she reported taking her levothyroxine 75 mcg each morning.

## 2022-10-24 NOTE — PATIENT COMMUNICATION
Phone Number Called: [phone] (home)       Call outcome: Did not leave a detailed message. Requested patient to call back.    Message: [id number]

## 2022-10-31 ENCOUNTER — OFFICE VISIT (OUTPATIENT)
Dept: MEDICAL GROUP | Age: 67
End: 2022-10-31
Payer: MEDICARE

## 2022-10-31 VITALS
SYSTOLIC BLOOD PRESSURE: 122 MMHG | TEMPERATURE: 96.8 F | WEIGHT: 117 LBS | OXYGEN SATURATION: 98 % | HEIGHT: 63 IN | HEART RATE: 74 BPM | DIASTOLIC BLOOD PRESSURE: 66 MMHG | BODY MASS INDEX: 20.73 KG/M2

## 2022-10-31 DIAGNOSIS — Z12.31 ENCOUNTER FOR SCREENING MAMMOGRAM FOR BREAST CANCER: ICD-10-CM

## 2022-10-31 DIAGNOSIS — Z78.9 VEGETARIAN DIET: ICD-10-CM

## 2022-10-31 DIAGNOSIS — E06.3 HYPOTHYROIDISM, ACQUIRED, AUTOIMMUNE: ICD-10-CM

## 2022-10-31 DIAGNOSIS — F02.A0 MILD LATE ONSET ALZHEIMER'S DEMENTIA, UNSPECIFIED WHETHER BEHAVIORAL, PSYCHOTIC, OR MOOD DISTURBANCE OR ANXIETY (HCC): ICD-10-CM

## 2022-10-31 DIAGNOSIS — R79.0 LOW FERRITIN: ICD-10-CM

## 2022-10-31 DIAGNOSIS — G30.1 MILD LATE ONSET ALZHEIMER'S DEMENTIA, UNSPECIFIED WHETHER BEHAVIORAL, PSYCHOTIC, OR MOOD DISTURBANCE OR ANXIETY (HCC): ICD-10-CM

## 2022-10-31 DIAGNOSIS — Z91.89 DRIVING SAFETY ISSUE: ICD-10-CM

## 2022-10-31 DIAGNOSIS — Z52.008 BLOOD DONOR: ICD-10-CM

## 2022-10-31 PROBLEM — R41.89 COGNITIVE DEFICITS: Status: RESOLVED | Noted: 2021-09-09 | Resolved: 2022-10-31

## 2022-10-31 PROCEDURE — 99215 OFFICE O/P EST HI 40 MIN: CPT | Performed by: FAMILY MEDICINE

## 2022-10-31 RX ORDER — LEVOTHYROXINE SODIUM 0.07 MG/1
75 TABLET ORAL
Qty: 90 TABLET | Refills: 0 | Status: SHIPPED | OUTPATIENT
Start: 2022-10-31 | End: 2023-05-24

## 2022-10-31 ASSESSMENT — FIBROSIS 4 INDEX: FIB4 SCORE: 1.34

## 2022-10-31 NOTE — PATIENT INSTRUCTIONS
Please have blood tests done in 2/2023.     Please continue to take Vitron-C daily.     Please call  for thyroid ultrasound.

## 2022-11-01 NOTE — PROGRESS NOTES
Subjective:   CC: Hypothyroidism, dementia follow-up    HPI:     Lidia Fitzpatrick is a 67 y.o. female, established patient of the clinic.     Patient presents with her  today to discuss newly diagnosed mild dementia, hypothyroidism, labs.    Patient has chronic hypothyroidism.  She is currently working with endocrinology.  The dosage of levothyroxine was adjusted to 75 mcg daily on July 8, 2022 by Beck Vega.  Unfortunately, patient continues to take levothyroxine 100 mcg daily.  Repeat labs in October 18, 2022 showed suppressed TSH with elevated T4.  Both patient and myself tried to contact endocrinology, but patient has not heard back from this service.     Patient was recently diagnosed with mild late onset Alzheimer's dementia by neurology, Dr. Huntley.  Both patient and her  have many questions regarding the diagnosis, imaging, and treatment.  Patient's  was not present during office visit with Dr. Huntley on October 11, 2022.       Due to concerns of dementia, patient's  license was temporarily suspended.  Patient wishes to be able to drive again.  She has contacted Dr. Huntley who recommended driving evaluation with occupational health.    Patient is a long-term vegetarian.  She also donates blood every 2 months.  Previous lab was notable for low ferritin.  Patient is not currently taking iron supplement.  CBC was negative for anemia.    Patient will be in [place] for 6 months and will not return to [place] until April 2023.    Current medicines (including changes today)  Current Outpatient Medications   Medication Sig Dispense Refill    levothyroxine (SYNTHROID) 75 MCG Tab Take 1 Tablet by mouth every morning on an empty stomach. 90 Tablet 0    Misc Natural Products (LUTEIN 20 PO) Take 20 mg by mouth every day.      vitamin k 100 MCG tablet Take 100 mcg by mouth every day.      Cholecalciferol (VITAMIN D3 PO) Take  by mouth.      multivitamin (THERAGRAN) Tab Take 1 Tab by mouth every day.    "   Calcium Citrate-Vitamin D (CALCIUM + D PO) Take 1 Tab by mouth every day.      Ginkgo Biloba 40 MG Tab Take 1 Tab by mouth every day.      b complex vitamins tablet Take 1 Tab by mouth every day.       No current facility-administered medications for this visit.     She  has a past medical history of Anxiety, CATARACT, Chronic thyroiditis, Depression, Dyslipidemia, Gynecological disorder, Hypothyroidism, Keratoconjunctivitis sicca, multiple thyroid nodules, Osteopenia (2005), S/P hysterectomy with oophorectomy (2016), Urinary incontinence (11/27/2017), and Urinary tract infection, site not specified.    She has no past medical history of Addisons disease (HCC), Adrenal disorder, Allergy, Anemia, Arthritis, Blood transfusion, Clotting disorder, COPD, Cushings syndrome, GERD (gastroesophageal reflux disease), Goiter, Headache(784.0), HIV (human immunodeficiency virus infection), IBD (inflammatory bowel disease), Meningitis, Migraine, Muscle disorder, Parathyroid disorder (HCC), Pituitary disease (HCC), Substance abuse (HCC), or Ulcer.    I reviewed patient's problem list, allergies, medications, family hx, social hx with patient and update EPIC.        Objective:     /66 (BP Location: Right arm, Patient Position: Sitting, BP Cuff Size: Adult)   Pulse 74   Temp 36 °C (96.8 °F) (Temporal)   Ht 1.6 m (5' 3\")   Wt 53.1 kg (117 lb)   SpO2 98%  Body mass index is 20.73 kg/m².    Physical Exam:  Constitutional: awake, alert, in no distress.  Skin: Warm, dry, good turgor, no rashes, bruises, ulcers in visible areas.  Neck: Trachea midline, no masses, no thyromegaly. No cervical or supraclavicular lymphadenopathy  Respiratory: Unlabored respiratory effort, lungs clear to auscultation, no wheezes, no rales.  Cardiovascular: Normal S1, S2, no murmur, no pedal edema.  Psych: Oriented x3, affect and mood wnl, intact judgement and insight.       Assessment and Plan:   The following treatment plan was discussed    1. " Hypothyroidism, acquired, autoimmune  Chronic, currently being managed by endocrinology.  Patient was instructed to take 75 mcg of levothyroxine in July 2022.  Unfortunately, patient has dementia and does not remember the instructions.   She continues to take levothyroxine 100 mcg daily.  Her most recent thyroid function test done in October 2022 was abnormal as suspected.  I discussed the correct dosage with patient and her .  She will start levothyroxine 75 mcg daily and repeat labs for reassessment in 3 months.  - levothyroxine (SYNTHROID) 75 MCG Tab; Take 1 Tablet by mouth every morning on an empty stomach.  Dispense: 90 Tablet; Refill: 0  - TSH; Future  - FREE THYROXINE; Future    2. Mild late onset Alzheimer's dementia, unspecified whether behavioral, psychotic, or mood disturbance or anxiety (HCC)  3. Driving safety issue  New diagnosis, working with Dr. Huntley, neurology.  No treatment or imaging recommended at this time.  Patient and has been has a lot of questions regarding treatment plan, imaging, follow-ups. I reviewed Dr. Huntley's note and provided counseling accordingly.  Patient believes that her  license should be reinstated.  Will refer patient to driving safety evaluation.  - Referral to Occupational Therapy    4. Low ferritin  5. Blood donor  6. Vegetarian diet  Previous labs was notable for low ferritin without anemia.    Likely secondary to frequent blood donation, vegetarian diet.   Patient does not take iron supplement currently.  - recommended Vitron-C: 1-2 tablets daily.   - iron-rich diet   - vitamins, minerals supplements for vegetarian diet discussed.  - FERRITIN; Future    7. Encounter for screening mammogram for breast cancer  - MA-SCREENING MAMMO BILAT W/TOMOSYNTHESIS W/CAD; Future    Total time spent reviewing pt's chart, labs, notes, imaging, and counseling/prescribing medications to patient before, during, and after the visit: 40 minutes.      Deana Chao,  M.D.      Followup: Return in about 6 months (around 4/30/2023) for Annual wellness visit.    Please note that this dictation was created using voice recognition software. I have made every reasonable attempt to correct obvious errors, but I expect that there are errors of grammar and possibly content that I did not discover before finalizing the note.

## 2022-11-02 ENCOUNTER — DOCUMENTATION (OUTPATIENT)
Dept: NEUROLOGY | Facility: MEDICAL CENTER | Age: 67
End: 2022-11-02
Payer: MEDICARE

## 2022-11-02 DIAGNOSIS — F03.A0 MILD NEURODEGENERATIVE DEMENTIA (HCC): ICD-10-CM

## 2022-11-09 DIAGNOSIS — Z74.09 IMPAIRED MOBILITY: ICD-10-CM

## 2022-11-09 DIAGNOSIS — Z91.89 DRIVING SAFETY ISSUE: ICD-10-CM

## 2022-11-11 ENCOUNTER — DOCUMENTATION (OUTPATIENT)
Dept: HEALTH INFORMATION MANAGEMENT | Facility: OTHER | Age: 67
End: 2022-11-11
Payer: MEDICARE

## 2023-04-06 ENCOUNTER — TELEPHONE (OUTPATIENT)
Dept: HEALTH INFORMATION MANAGEMENT | Facility: OTHER | Age: 68
End: 2023-04-06
Payer: MEDICARE

## 2023-05-03 ENCOUNTER — APPOINTMENT (OUTPATIENT)
Dept: OCCUPATIONAL THERAPY | Facility: REHABILITATION | Age: 68
End: 2023-05-03
Attending: PSYCHIATRY & NEUROLOGY
Payer: MEDICARE

## 2023-05-11 RX ORDER — PERPHENAZINE/AMITRIPTYLINE HCL 4 MG-25 MG
TABLET ORAL DAILY
COMMUNITY
End: 2023-06-09

## 2023-05-17 ENCOUNTER — OFFICE VISIT (OUTPATIENT)
Dept: INTERNAL MEDICINE | Facility: OTHER | Age: 68
End: 2023-05-17
Payer: MEDICARE

## 2023-05-17 VITALS
HEIGHT: 63 IN | BODY MASS INDEX: 20.59 KG/M2 | SYSTOLIC BLOOD PRESSURE: 136 MMHG | DIASTOLIC BLOOD PRESSURE: 76 MMHG | RESPIRATION RATE: 12 BRPM | WEIGHT: 116.2 LBS | TEMPERATURE: 97.6 F | OXYGEN SATURATION: 97 % | HEART RATE: 54 BPM

## 2023-05-17 DIAGNOSIS — E03.8 OTHER SPECIFIED HYPOTHYROIDISM: ICD-10-CM

## 2023-05-17 DIAGNOSIS — F32.A DEPRESSION, UNSPECIFIED DEPRESSION TYPE: ICD-10-CM

## 2023-05-17 DIAGNOSIS — M81.0 AGE-RELATED OSTEOPOROSIS WITHOUT CURRENT PATHOLOGICAL FRACTURE: ICD-10-CM

## 2023-05-17 DIAGNOSIS — G30.1 MILD LATE ONSET ALZHEIMER'S DEMENTIA, UNSPECIFIED WHETHER BEHAVIORAL, PSYCHOTIC, OR MOOD DISTURBANCE OR ANXIETY (HCC): ICD-10-CM

## 2023-05-17 DIAGNOSIS — F02.A0 MILD LATE ONSET ALZHEIMER'S DEMENTIA, UNSPECIFIED WHETHER BEHAVIORAL, PSYCHOTIC, OR MOOD DISTURBANCE OR ANXIETY (HCC): ICD-10-CM

## 2023-05-17 PROCEDURE — 99205 OFFICE O/P NEW HI 60 MIN: CPT | Performed by: REGISTERED NURSE

## 2023-05-17 PROCEDURE — 3075F SYST BP GE 130 - 139MM HG: CPT | Performed by: REGISTERED NURSE

## 2023-05-17 PROCEDURE — 3078F DIAST BP <80 MM HG: CPT | Performed by: REGISTERED NURSE

## 2023-05-17 SDOH — SOCIAL STABILITY: SOCIAL NETWORK: HOW OFTEN DO YOU GET TOGETHER WITH FRIENDS OR RELATIVES?: TWICE A WEEK

## 2023-05-17 SDOH — ECONOMIC STABILITY: HOUSING INSECURITY
IN THE LAST 12 MONTHS, WAS THERE A TIME WHEN YOU DID NOT HAVE A STEADY PLACE TO SLEEP OR SLEPT IN A SHELTER (INCLUDING NOW)?: NO

## 2023-05-17 SDOH — HEALTH STABILITY: MENTAL HEALTH: HOW OFTEN DO YOU HAVE A DRINK CONTAINING ALCOHOL?: NEVER

## 2023-05-17 SDOH — ECONOMIC STABILITY: FOOD INSECURITY: WITHIN THE PAST 12 MONTHS, YOU WORRIED THAT YOUR FOOD WOULD RUN OUT BEFORE YOU GOT MONEY TO BUY MORE.: NEVER TRUE

## 2023-05-17 SDOH — SOCIAL STABILITY: SOCIAL INSECURITY: WITHIN THE LAST YEAR, HAVE YOU BEEN AFRAID OF YOUR PARTNER OR EX-PARTNER?: NO

## 2023-05-17 SDOH — HEALTH STABILITY: MENTAL HEALTH: HOW OFTEN DO YOU HAVE 6 OR MORE DRINKS ON ONE OCCASION?: NEVER

## 2023-05-17 SDOH — SOCIAL STABILITY: SOCIAL INSECURITY
WITHIN THE LAST YEAR, HAVE TO BEEN RAPED OR FORCED TO HAVE ANY KIND OF SEXUAL ACTIVITY BY YOUR PARTNER OR EX-PARTNER?: NO

## 2023-05-17 SDOH — SOCIAL STABILITY: SOCIAL NETWORK: ARE YOU MARRIED, WIDOWED, DIVORCED, SEPARATED, NEVER MARRIED, OR LIVING WITH A PARTNER?: MARRIED

## 2023-05-17 SDOH — ECONOMIC STABILITY: FOOD INSECURITY: WITHIN THE PAST 12 MONTHS, THE FOOD YOU BOUGHT JUST DIDN'T LAST AND YOU DIDN'T HAVE MONEY TO GET MORE.: NEVER TRUE

## 2023-05-17 SDOH — SOCIAL STABILITY: SOCIAL NETWORK
IN A TYPICAL WEEK, HOW MANY TIMES DO YOU TALK ON THE PHONE WITH FAMILY, FRIENDS, OR NEIGHBORS?: MORE THAN THREE TIMES A WEEK

## 2023-05-17 SDOH — SOCIAL STABILITY: SOCIAL INSECURITY
WITHIN THE LAST YEAR, HAVE YOU BEEN KICKED, HIT, SLAPPED, OR OTHERWISE PHYSICALLY HURT BY YOUR PARTNER OR EX-PARTNER?: NO

## 2023-05-17 SDOH — SOCIAL STABILITY: SOCIAL INSECURITY: WITHIN THE LAST YEAR, HAVE YOU BEEN HUMILIATED OR EMOTIONALLY ABUSED IN OTHER WAYS BY YOUR PARTNER OR EX-PARTNER?: NO

## 2023-05-17 SDOH — HEALTH STABILITY: MENTAL HEALTH: HOW MANY STANDARD DRINKS CONTAINING ALCOHOL DO YOU HAVE ON A TYPICAL DAY?: PATIENT DOES NOT DRINK

## 2023-05-17 SDOH — HEALTH STABILITY: MENTAL HEALTH
STRESS IS WHEN SOMEONE FEELS TENSE, NERVOUS, ANXIOUS, OR CAN'T SLEEP AT NIGHT BECAUSE THEIR MIND IS TROUBLED. HOW STRESSED ARE YOU?: RATHER MUCH

## 2023-05-17 SDOH — ECONOMIC STABILITY: INCOME INSECURITY: HOW HARD IS IT FOR YOU TO PAY FOR THE VERY BASICS LIKE FOOD, HOUSING, MEDICAL CARE, AND HEATING?: NOT HARD AT ALL

## 2023-05-17 SDOH — ECONOMIC STABILITY: INCOME INSECURITY: IN THE LAST 12 MONTHS, WAS THERE A TIME WHEN YOU WERE NOT ABLE TO PAY THE MORTGAGE OR RENT ON TIME?: NO

## 2023-05-17 SDOH — HEALTH STABILITY: PHYSICAL HEALTH: ON AVERAGE, HOW MANY DAYS PER WEEK DO YOU ENGAGE IN MODERATE TO STRENUOUS EXERCISE (LIKE A BRISK WALK)?: 5 DAYS

## 2023-05-17 SDOH — HEALTH STABILITY: PHYSICAL HEALTH: ON AVERAGE, HOW MANY MINUTES DO YOU ENGAGE IN EXERCISE AT THIS LEVEL?: 120 MIN

## 2023-05-17 ASSESSMENT — PATIENT HEALTH QUESTIONNAIRE - PHQ9: CLINICAL INTERPRETATION OF PHQ2 SCORE: 0

## 2023-05-17 ASSESSMENT — ANXIETY QUESTIONNAIRES
4. TROUBLE RELAXING: NOT AT ALL
IF YOU CHECKED OFF ANY PROBLEMS ON THIS QUESTIONNAIRE, HOW DIFFICULT HAVE THESE PROBLEMS MADE IT FOR YOU TO DO YOUR WORK, TAKE CARE OF THINGS AT HOME, OR GET ALONG WITH OTHER PEOPLE: NOT DIFFICULT AT ALL
7. FEELING AFRAID AS IF SOMETHING AWFUL MIGHT HAPPEN: NOT AT ALL
3. WORRYING TOO MUCH ABOUT DIFFERENT THINGS: NOT AT ALL
6. BECOMING EASILY ANNOYED OR IRRITABLE: NOT AT ALL
GAD7 TOTAL SCORE: 1
2. NOT BEING ABLE TO STOP OR CONTROL WORRYING: NOT AT ALL
5. BEING SO RESTLESS THAT IT IS HARD TO SIT STILL: SEVERAL DAYS
1. FEELING NERVOUS, ANXIOUS, OR ON EDGE: NOT AT ALL

## 2023-05-17 ASSESSMENT — ENCOUNTER SYMPTOMS: GENERAL WELL-BEING: GOOD

## 2023-05-17 ASSESSMENT — LIFESTYLE VARIABLES
SKIP TO QUESTIONS 9-10: 1
AUDIT-C TOTAL SCORE: 0

## 2023-05-17 ASSESSMENT — FIBROSIS 4 INDEX: FIB4 SCORE: 1.34

## 2023-05-17 ASSESSMENT — ACTIVITIES OF DAILY LIVING (ADL): BATHING_REQUIRES_ASSISTANCE: 0

## 2023-05-17 NOTE — PROGRESS NOTES
MA CGA Assessment    NAME: Stephanie Fitzpatrick     Patient's Primary Language if not English:   Patient's Care Partner(s) Name: Jass Lopez   Care Partners(s) Relationship to Patient: Spouse, son       IADL  Legend:   1- independent  2 - need assistance  3 - unable to complete       Are you still driving? Not currently     Are you able to prepare your own meals and/or cook, need assistance or unable to complete? 1    Are you able to do shopping and errands, need assistance or unable to complete? 2    Are you able to do housekeeping, chores, need assistance or unable to complete? 1    Are you able to do laundry, need assistance or unable to complete? 1    Are you able to manage your medications, need assistance or unable to complete? 1    Are you able to do your own money management, need assistance or unable to complete? 3    Are you able to use the phone, need assistance or unable to use the phone? 2    ADL    Are you independent, need assistance, or unable to bathe yourself? 1    Are you independent, need assistance, or unable to dress yourself? 1    Are you independent, need assistance, or unable to feed yourself? 1    Are you independent, need assistance, or unable to get up out of a chair or off a bed? 1    Are you independent, need assistance, or unable to use the toilet by yourself? 1    Are you aware when you need to use the toilet? 1    If no, please describe the problem you are experiencing.       Assist Devices: Patient uses:  Cane: No   Walker:No   Wheelchair:No   Amputations:No   Chronic oxygen use: No   CPAP/BIPAP use: No   : Reports urinary leakage during the last 6 months that has not interfered at all with their daily activities or sleep.  If you have a problem with continence, do you wear special garments?  Pads       Fall Screening:   Any falls within the last year? 1  Any injuries associated with the falls? Yes   -If yes, what injury? Just bruising, soreness   Do you worry about falling?No   Do you  feel unsteady when standing or walking? Yes   You have symptoms of lightheadedness or dizziness from lying to standing? No     Any throw rugs on floor? Yes   Do you have stairs?No   Do you have handrails on all stairs?   Good lighting in all hallways. Yes   Any difficulty hearing? No   Wears hearing aids:No  Any difficulty with vision? No   Last eye exam: 2 yrs ago       FRAIL SCALE    Fatigue:  How much time during the past 4 weeks did you feel tired:  1=All the time, 2=Most the time, 3=Some of the time, 4=A little of the time,  5=None of the time  Answer: 5  (responses of 1 or 2 are scored as 1 and all others as 0)  SCORE: 0    Resistance:  By yourself and not using aids, do you have any difficulty walking up 10 steps without resting?  1=Yes, 0=No  SCORE: 0    Ambulation:  By yourself and not using aids, do you have any difficulty walking a couple of blocks?  1=Yes, 0=No  SCORE:0      Loss of Weight over last year:   If noted above, one year ago, how much did you weigh:?  ~same   (percent change is computed as: weight 1 year ago- current weight/weight 1 year ago. X 100.  (more than 5% weight loss is scored as 1, and less than 5% is 0)    Score: 0    Total Score: 0    Scorin =  Robust Health  1-2=Pre-frail  3-5=Frail    Ask if they have been admitted to the hospital in the past three month: No       Mini Cog Clock-  0/2  Time 1:25        Depression Screen - PHQ- 9   0 = Not at all, 1 = Several days,  2 = More than half the days,  3 = Nearly every day     Little interest or pleasure in doing things? 0  Feeling down, depressed , or hopeless? 0  Trouble falling or staying asleep, or sleeping too much?  0  Feeling tired or having little energy? 0  Poor appetite or overeating?  0  Feeling bad about yourself - or that you are a failure or have let yourself or your family down? 0   Trouble concentrating on things, such as reading the newspaper or watching television? 1  Moving or speaking so slowly that other people  could have noticed.  Or the opposite - being so fidgety or restless that you have been moving around a lot more than usual? 0  Thoughts that you would be better off dead, or of hurting yourself? 0  Patient Health Questionnaire Score:  1    Anxiety Screen/ARNEL-7 Questionnaire  0 = Not at all, 1 = Several days,  2 = More than half the days,  3 = Nearly every day     Feeling nervous, anxious, or on edge: 0  Not being able to stop or control worryin     Worrying too much about different things: 0  Trouble relaxin  Being so restless that it's hard to sit still: 1  Becoming easily annoyed or irritable: 0  Feeling afraid as if something awful might happen: 0  Total Score : 1         Interpretation of ARNEL 7 Total Score   Score Severity :  0-4 No Anxiety   5-9 Mild Anxiety  10-14 Moderate Anxiety  15-21 Severe Anxiety      I

## 2023-05-17 NOTE — PATIENT INSTRUCTIONS
We thank you for taking the time to share during your medical visit with our team of providers at the [facility]. During the medical visit we completed a Comprehensive Geriatric Assessment with a , pharmacist, and nurse practitioners, all specialty trained in Geriatrics.     Below are our Age Friendly recommendations. Our recommendations are shaped using the 4 M’s model of care. In using the 4 M’s we approach care from starting with What Matters most to you.  We then use Mobility, Medications and Mentation to support that. Please note, our recommendations are another point on your health care journey. We hope the time we spent together helps you achieve What Matters most to you.    What Matters     During the visit you shared that you were hoping to address your memory changes and medications. You also shared that being outside in nature, hiking with your dogs, traveling and being with your children and grandchild gives you meaning in life and that you are looking forward to upcoming travel plans to [place] and [place].    We appreciate your openness and honesty with sharing this personal information. In helping you achieve What Matters we know that happiness, safety, support, companionship, symptom control, adequate sleep, and advanced planning can help many of us achieve What Matters. Below are some resources that we discussed in clinic that we think will help you achieve What Matters, especially some information about advance care planning.     Mentation    Mood  During your assessment we felt that your mood may be a playing a role in your health. To better address your mood, we recommend a variety of medical, pharmacological, and behavioral health approaches to treatment.     Behavioral Health: When we think about our behavior, we think of both of our cognitive and physical behaviors. Sometimes working through some of our thoughts and/or emotions can be helpful. We recommend  seeing a counselor/therapist to discuss some of these issues. Physical activity can help us improve our mood. We recommend you be active to help you achieve What Matters most to you.     As you work through your treatment plan, please make sure to keep active and doing those things that give you meaning in life. Thinking, memory, mood, and mental health matter! Just like your body changes with age, so can your brain. Ways to support mentation include being engaged in meaningful activities and managing stress and anxiety. Trying new ways to relax and connect may be helpful.    Memory  During your assessment, we noted that your cognition is affecting your ability to function in daily life. Support from family and friends can be very important as you continue this part of your health care journey. Below are the specific recommendations we discussed during your visit. Please continue to follow up with your provider about any concerns or sudden changes that you or your family note.       Medications  During our assessment, we reviewed your medications to make sure they are supporting What Matters most you. Based on our discussion we thought there may be opportunity to adjust some medications to help you better achieve your health goals. When adjusting medications, we generally like to only make one change at a time. Below are some recommendations for you to discuss with your primary provider.  1.You are taking a vitamin k supplement. The multiple vitamin has 25 mcg of vitamin K. Foods rich in vitamin K include spinach, kale, broccoli, turnip greens, collards. The recommended adequate intake of vitamin K is 90 mcg daily and you are getting more than the recommended amount of vitamin K in the spinach salad you eat daily. 1 cup of raw spinach has 145 mcg of vitamin K    Recommendation: Consider stopping the vitamin K supplement.     2.You are taking the NeuroQ Memory & Focus and Youthful Brain. There is very little data to  support the use of these supplements.     Recommendation: These supplements can be costly and there is not good evidence to support the benefits at this time. I recommend you consider stopping these supplements. Keep in mind, you can always restart it if you decide they were helping you.     3. Lutein and zeaxanthin are carotenoids found in the retina. Studies have suggested that diets rich in lutein and zeaxanthin may help slow the development of age-related macular degeneration. Data to support the use of lutein and zeaxanthin in managing other eye conditions such as cataracts are lacking.     In addition to eye health, there is interest in lutein and zeaxanthin in the role in cognitive function. More research needs to be done in this area to establish if there is a benefit.     Foods rich in lutein and zeaxanthin include spinach, kale, turnips, and cele greens.    Recommendation: You are getting a lot of lutein and zeaxanthin from your diet and that is great. You do not need to take an additional lutein supplement and you may wish to consider stopping it.     4. You have a diagnosis of osteoporosis and it is important to get adequate calcium and vitamin d. I think between the supplements you are taking and your diet, you are getting adequate calcium and vitamin d.     5. [employer] [facility] Chocolate Protein powder makes a wonderful shake. It is available on Amazon for $45.28 for a large container.    6. Your body needs vitamin B12 for keeping blood cells and the brain healthy. It also uses vitamin B12 to make DNA, which is the genetic material in cells. Low vitamin B12 levels can sometimes cause memory issues.  As people age, the amount of vitamin B12 absorbed from the foods they eat may decrease. This occurs because older adults may have decreased stomach acid and/or decreased amounts of a substance called intrinsic factor. Stomach acid is needed to separate vitamin B12 from food. The intrinsic factor  "binds with the vitamin B12 so it can be absorbed in the intestines. The body absorbs some vitamin B12 without this factor and acid is not needed for supplement absorption so oral supplements are still effective to increase levels in most cases. Vitamin B12 is found in beef liver, clams, fish, meat, poultry, eggs, milk, and other dairy products. It is also added to some breakfast cereals and other foods.    People 50 years and older should supplement their diet with an additional 100 to 400 mcg per day. Vitamin B12 supplements are also available in many different forms. Although the sublingual form is advertised to be absorbed better than tablets, there is no scientific evidence to support this. Sublingual supplements appear to be more effective because of the typically high doses (0.5 mg), not because of placement of the tablet.     Recommendation: Consider adding a daily vitamin B12 1000 mcg supplement. I am recommending a dose higher than 400 mcg because you are a vegetarian.    7. You are taking an Evening Primrose Oil supplement. This is commonly used for hot flashes and you are not experiencing these or other menopausal symptoms.    Recommendation: Consider stopping the Evening Primrose Oil supplement since you are not experiencing any menopausal symptoms.     Mobility   During our assessment we were happy to see how active you are! We encourage you to keep this up. At the [facility] we focus on how mobility can help you achieve What Matter’s. In general, we recommend all adults be active every day, to the best of their ability.     Other Recommendations  Medical Recommendations:  Stay hydrated with water and non-sugary and non-caffeinated drinks. We recommend eight glasses of 8 ounces of water a day.   For your caregivers, we have provided a list of \"Activators\" that may be helpful in identifying and responding to behaviors in individuals with dementia. Recommend CARES Approach training (See handouts)  We " "do not advise driving.   May consider supplementing nutrition with Ensure, Boost or protein shake powder for missed meals or calories.   Consider ways to add reminders for orientation such as electronic calendars by the bedside, labeled and updated family photos.   Self-study program with [employer]. (See more info from )  Strongly encourage revisiting osteoporosis diagnosis with PCP and recommend treatment. Avoid falls as this may lead to a fracture due to your osteoporosis.   Continue follow up with your Neurologist for management of diagnosis of dementia.   Consider a POLST form filled by your PCP and yourself. (See pink handout)  Dementia  is a syndrome but statistically and for the majority of patients  occurs due  to a more rapid aging of the brain tissue or potentially from injury to certain parts of one's brain (often from such contributing factors as the cumulative effects of alcohol, from one or more ischemic or hemmorhagic stroke(s), from neurodegeneration or long standing with/without suboptimally controlled Hypertension). It is for some of these potential factors as to why I recommend a brain imaging test (Head CT or Brain MRI) be done for the 1st time or in certain circumstances repeated for comparison purposes  as such imaging can suggest one or more factors as to the reason(s) for the person's cognitive/memory changes. In fact, a normal or \"age related\" finding on a brain imaging test in and of itself is useful clinical and objective information to have in the evaluation of a person who has either an acute, evolving or even chronic (months to years) long cognitive/memory complaint.    Social Work Care Plan:   We hear that being outside in nature hiking brings you julia. We are concerned about the length of time that Stephanie is out in the wilderness without other people and limited access to shade and hydration. While we support Stephanie’s desire for independence and alone time, " consider how to do this within a harm reduction framework.    Locate and purchase a medical safety alert watch or other device to assist in managing elopement from the house. There is a private pay cost to this type of service, typically a monthly fee varying from $10-$40/month. Options can be found on; Amazon.com, [employer] and [employer]. We hear that you are also looking at gps options for hiking and will be trialing options.    Consider attending [employer]. They have educational, social, cultural, and recreational programs. $85/year membership with access to all programming. [phone]; [email or web address] We think you would specifically enjoy the long distance hiking and Thursday hikers groups.    Consider engaging with Dementia [facility]: Join people living with dementia, family care partners, and interested community members for a weekly conversation about living well with dementia. All sessions are hosted by a person living with dementia and a family care partner.    Dementia Conversations with Clayton and Na: Mondays from 10:00 - 11:30 AM    Dementia Conversations with Aida: Fridays from 1:00 - 2:30 PM    To register, visit [email or web address]    Consider connecting to the [facility] Alzheimer’s Assosication specific to the Younger Onset groups and education. You may find this information to be the most helpful to your specific situation.    https://www.alz.org/help-support/i-have-alz/younger-onset    Caregiving Support  For future reference, we want you to be aware of the following resources that may be helpful to you.    Consider a home safety evaluation to have safety recommendations made specific to aging. An option is occupational therapist Mini Salazar. She has years of experience doing in-home safety evaluations. You can contact her at [phone], [email or web address]. There is a cost to  this service that is not covered by insurance, approximately $200-250.    For caregiver support, contact the Alzheimer’s Association at 1-600.480.2781 or via their website, https://www.alz.org/siobhan. They provide support groups, education and 24 hour support. They also have a respite collins they can help you apply for to use for various services. $1000/year, possibly more depending on needs.    Research an Adult Day Health Program. These services provide socialization, exercise and live entertainment. Some options in our area are:  [facility] - [address] [address] ([phone])  [facility] -[address], [place] [address]. [phone]. Expanded hours to come, currently 10:00 a.m. - 3:00 p.m.  Review the [place] Caregivers Guide enclosed in your packet. It is a wealth of information. You can also find this guide online at https://[place]Loco2.org/documents/. [place]GrantAdler.org is a comprehensive database of resources created by the [facility] in partnership with the [facility] and the [facility] [employer].  Consider connecting to Dementia Friendly [place] and join the Dementia Friends Information Session. 12:00-1:30pm on the 3rd Wednesday of every month via Zoom. This group covers the basics of dementia, discuss differences between dementia and normal aging, go through activities to better understand what it’s like to live with dementia, and then review strategies for effective communication with someone living with dementia.  www.dementiafriendly[place].org/dementia-friends  Review the Manual About Living with Dementia via the Dementia [employer]. This is a thorough resource of information for understanding a dementia diagnosis that was co-authored with people living with dementia and their care partners.  Manual About Living With Dementia  [employer]  (daanow.org)    https://daanow.org/pathways-to-well-being-manual/    Dementia Engagement, Education, and Research (DEER) Program is accepting scholarship applications for CARES Dementia Basics training certifications in English and [place]ian for family and professional caregivers. This course provides extensive education about careing for those with cognitive challenges. Contact Elías Reyes at [email or web address] or call [phone] for more information.  You can contact [facility] at [phone] or [facility] [email or web address] and click the “Request Help” button. [facility] is a direct link to aging services in [place]. A service navigator can answer most questions you have. They provide resources, service navigation and case management free of charge.     Referral Recommendations (to be ordered by your primary care provider)  Counseling/Therapy       To follow up with our recommendation (orders, referrals, med changes, etc) we encourage you to follow with their primary care provider before implementing these changes.

## 2023-05-17 NOTE — PROGRESS NOTES
Provider Recommendations  1.Recommended she take a vitamin B12 supplement rather than the Youthful Brain supplement.    2.Recommended she get her labs done.       Patient Recommendations    1.You are taking a vitamin k supplement. The multiple vitamin has 25 mcg of vitamin K. Foods rich in vitamin K include spinach, kale, broccoli, turnip greens, collards. The recommended adequate intake of vitamin K is 90 mcg daily and you are getting more than the recommended amount of vitamin K in the spinach salad you eat daily. 1 cup of raw spinach has 145 mcg of vitamin K    Recommendation: Consider stopping the vitamin K supplement.     2.You are taking the NeuroQ Memory & Focus and Youthful Brain. There is very little data to support the use of these supplements.     Recommendation: These supplements can be costly and there is not good evidence to support the benefits at this time. I recommend you consider stopping these supplements. Keep in mind, you can always restart it if you decide they were helping you.     3. Lutein and zeaxanthin are carotenoids found in the retina. Studies have suggested that diets rich in lutein and zeaxanthin may help slow the development of age-related macular degeneration. Data to support the use of lutein and zeaxanthin in managing other eye conditions such as cataracts are lacking.     In addition to eye health, there is interest in lutein and zeaxanthin in the role in cognitive function. More research needs to be done in this area to establish if there is a benefit.     Foods rich in lutein and zeaxanthin include spinach, kale, turnips, and cele greens.    Recommendation: You are getting a lot of lutein and zeaxanthin from your diet and that is great. You do not need to take an additional lutein supplement and you may wish to consider stopping it.     4. You have a diagnosis of osteoporosis and it is important to get adequate calcium and vitamin d. I think between the supplements you are  taking and your diet, you are getting adequate calcium and vitamin d.     5. Electrolytic Ozone [facility] Chocolate Protein powder makes a wonderful shake. It is available on [employer] for $45.28 for a large container.    6. Your body needs vitamin B12 for keeping blood cells and the brain healthy. It also uses vitamin B12 to make DNA, which is the genetic material in cells. Low vitamin B12 levels can sometimes cause memory issues.  As people age, the amount of vitamin B12 absorbed from the foods they eat may decrease. This occurs because older adults may have decreased stomach acid and/or decreased amounts of a substance called intrinsic factor. Stomach acid is needed to separate vitamin B12 from food. The intrinsic factor binds with the vitamin B12 so it can be absorbed in the intestines. The body absorbs some vitamin B12 without this factor and acid is not needed for supplement absorption so oral supplements are still effective to increase levels in most cases. Vitamin B12 is found in beef liver, clams, fish, meat, poultry, eggs, milk, and other dairy products. It is also added to some breakfast cereals and other foods.    People 50 years and older should supplement their diet with an additional 100 to 400 mcg per day. Vitamin B12 supplements are also available in many different forms. Although the sublingual form is advertised to be absorbed better than tablets, there is no scientific evidence to support this. Sublingual supplements appear to be more effective because of the typically high doses (0.5 mg), not because of placement of the tablet.     Recommendation: Consider adding a daily vitamin B12 1000 mcg supplement. I am recommending a dose higher than 400 mcg because you are a vegetarian.    7. You are taking an Evening Primrose Oil supplement. This is commonly used for hot flashes and you are not experiencing these or other menopausal symptoms.    Recommendation: Consider stopping the Evening Primrose Oil  supplement since you are not experiencing any menopausal symptoms.     Accompanied by:  Jhon  Primary concern: memory  Who manages meds? Self manages  Pill box? no  How many times a week do you forget?   How many times a day? Calcium at night, the rest in the morning  Medication cost a concern? no  Beer's meds? none  FRID drugs? none  Falls: fall while hiking  Meds dosed appropriately for renal and liver fxn? yes  Hx of MI, stroke, peripheral vascular disease? no  OTC Pain Relievers:  none    - B12 801 (10/18/22)  - eGFR 78 (5/24/22)  - Vit D3 55 (5/24/22)  - Lipids: WNL  - BP today: 136/76, HR 54  - Nutrition is a concern of son: salad for dinner, mandarins, mac & cheese. Loves tea. Does not drink water.Spinach salad with carrots and cucumber.     Hypothyroidism  10/18/22  TSH 0.015  Free T4 1.87  - levothyroxine: takes at 6:00 am then goes back to sleep. Knowledgeable that she cannot take the vitamins with the levothyroxine.     Osteoporosis  - declined treatment in 7/2021  - drinks Silk Vanilla soy milk daily in [employer] Tea and cereal. 470 mg per cup  - Likely getting adequate calcium from dietary and supplementation    Supplements  One Daily Women's Formula  Neuro Q Memory & Focus  Youthful Brain  Vitamin D3 2000 IU  Calcium Carbonate/vit d: 600 mg/ 500 IU)  Vitron C Iron with C - 10/31/22 PCP notes indicate low ferritin. Recommended Vitron C iron supplement 1 to 2 daily  Lutein  Vitamin K2 100 mcg  Evening Primrose Oil 1000 mg

## 2023-05-17 NOTE — PROGRESS NOTES
Interdisciplinary Comprehensive Geriatric Assessment (CGA) - [facility]    Brief Overview of assessment:    Our comprehensive geriatric assessment (CGA) team is comprised of a medical assistant (MA), medical provider, pharmacist, and , all of whom create a note during the assessment. The patient's assessment starts with the MA who screens the patient for many common geriatric syndromes. If possible, the MA does these assessments with the patient alone. The MA then introduces the patient and (s) to the rest of the CGA team and shares information collected during the screening assessments. The CGA team then completes the assessment and provides recommendations over the next 90 minutes.  We provide recommendations modeling that of an Age-Friendly Health System with the 4 Ms of Care (What Matters, Mentation, Medications, and Mobility). For any questions about our assessment or recommendations, please reach out to our office ([phone]). To learn more about providing age friendly care to your patients consider visiting this web site to learn more. http://www.ihi.org/Engage/Initiatives/Age-Friendly-Health-Systems/Pages/default.    In general, we encourage patients to follow-up with their primary care provider prior to implementing the recommendations (orders, referrals, med changes, etc) discussed during the visit today. See A/P and patient instructions below.      Visit Note:  Chief Complaint   Patient presents with    Health Risk Assessments For Seniors     [facility]      Patient Name: Lidia Fitzpatrick  Patient Age; 67 y.o.  Date of Consult: 5/17/2023  Referring Provider: referred by Jass (son)   PCP: Deana Chao M.D.    Interdisciplinary Geriatric Consult Provided By:   SANJEEV Juarez, Pharm D  Rosi Vieyra, MSW, LCSW    Assessment and Plan:   Hypothyroidism_[facility]  See CGA  recommendations    Age-related osteoporosis without current pathological fracture_[facility]  See CGA recommendations    Depression  See CGA recommendations    Mild late onset Alzheimer's dementia (HCC)  See CGA recommendations      We thank you for taking the time to share during your medical visit with our team of providers at the [facility]. During the medical visit we completed a Comprehensive Geriatric Assessment with a , pharmacist, and nurse practitioners, all specialty trained in Geriatrics.     Below are our Age Friendly recommendations. Our recommendations are shaped using the 4 M’s model of care. In using the 4 M’s we approach care from starting with What Matters most to you.  We then use Mobility, Medications and Mentation to support that. Please note, our recommendations are another point on your health care journey. We hope the time we spent together helps you achieve What Matters most to you.    What Matters     During the visit you shared that you were hoping to address your memory changes and medications. You also shared that being outside in nature, hiking with your dogs, traveling and being with your children and grandchild gives you meaning in life and that you are looking forward to upcoming travel plans to [place] and [place].    We appreciate your openness and honesty with sharing this personal information. In helping you achieve What Matters we know that happiness, safety, support, companionship, symptom control, adequate sleep, and advanced planning can help many of us achieve What Matters. Below are some resources that we discussed in clinic that we think will help you achieve What Matters, especially some information about advance care planning.     Mentation    Mood  During your assessment we felt that your mood may be a playing a role in your health. To better address your mood, we recommend a variety of medical, pharmacological, and behavioral health  approaches to treatment.     Behavioral Health: When we think about our behavior, we think of both of our cognitive and physical behaviors. Sometimes working through some of our thoughts and/or emotions can be helpful. We recommend seeing a counselor/therapist to discuss some of these issues. Physical activity can help us improve our mood. We recommend you be active to help you achieve What Matters most to you.     As you work through your treatment plan, please make sure to keep active and doing those things that give you meaning in life. Thinking, memory, mood, and mental health matter! Just like your body changes with age, so can your brain. Ways to support mentation include being engaged in meaningful activities and managing stress and anxiety. Trying new ways to relax and connect may be helpful.    Memory  During your assessment, we noted that your cognition is affecting your ability to function in daily life. Support from family and friends can be very important as you continue this part of your health care journey. Below are the specific recommendations we discussed during your visit. Please continue to follow up with your provider about any concerns or sudden changes that you or your family note.       Medications  During our assessment, we reviewed your medications to make sure they are supporting What Matters most you. Based on our discussion we thought there may be opportunity to adjust some medications to help you better achieve your health goals. When adjusting medications, we generally like to only make one change at a time. Below are some recommendations for you to discuss with your primary provider.  1.You are taking a vitamin k supplement. The multiple vitamin has 25 mcg of vitamin K. Foods rich in vitamin K include spinach, kale, broccoli, turnip greens, collards. The recommended adequate intake of vitamin K is 90 mcg daily and you are getting more than the recommended amount of vitamin K in the  spinach salad you eat daily. 1 cup of raw spinach has 145 mcg of vitamin K    Recommendation: Consider stopping the vitamin K supplement.     2.You are taking the NeuroQ Memory & Focus and Youthful Brain. There is very little data to support the use of these supplements.     Recommendation: These supplements can be costly and there is not good evidence to support the benefits at this time. I recommend you consider stopping these supplements. Keep in mind, you can always restart it if you decide they were helping you.     3. Lutein and zeaxanthin are carotenoids found in the retina. Studies have suggested that diets rich in lutein and zeaxanthin may help slow the development of age-related macular degeneration. Data to support the use of lutein and zeaxanthin in managing other eye conditions such as cataracts are lacking.     In addition to eye health, there is interest in lutein and zeaxanthin in the role in cognitive function. More research needs to be done in this area to establish if there is a benefit.     Foods rich in lutein and zeaxanthin include spinach, kale, turnips, and cele greens.    Recommendation: You are getting a lot of lutein and zeaxanthin from your diet and that is great. You do not need to take an additional lutein supplement and you may wish to consider stopping it.     4. You have a diagnosis of osteoporosis and it is important to get adequate calcium and vitamin d. I think between the supplements you are taking and your diet, you are getting adequate calcium and vitamin d.     5. [facility] Chocolate Protein powder makes a wonderful shake. It is available on Amazon for $45.28 for a large container.    6. Your body needs vitamin B12 for keeping blood cells and the brain healthy. It also uses vitamin B12 to make DNA, which is the genetic material in cells. Low vitamin B12 levels can sometimes cause memory issues.  As people age, the amount of vitamin B12 absorbed from the  foods they eat may decrease. This occurs because older adults may have decreased stomach acid and/or decreased amounts of a substance called intrinsic factor. Stomach acid is needed to separate vitamin B12 from food. The intrinsic factor binds with the vitamin B12 so it can be absorbed in the intestines. The body absorbs some vitamin B12 without this factor and acid is not needed for supplement absorption so oral supplements are still effective to increase levels in most cases. Vitamin B12 is found in beef liver, clams, fish, meat, poultry, eggs, milk, and other dairy products. It is also added to some breakfast cereals and other foods.    People 50 years and older should supplement their diet with an additional 100 to 400 mcg per day. Vitamin B12 supplements are also available in many different forms. Although the sublingual form is advertised to be absorbed better than tablets, there is no scientific evidence to support this. Sublingual supplements appear to be more effective because of the typically high doses (0.5 mg), not because of placement of the tablet.     Recommendation: Consider adding a daily vitamin B12 1000 mcg supplement. I am recommending a dose higher than 400 mcg because you are a vegetarian.    7. You are taking an Evening Primrose Oil supplement. This is commonly used for hot flashes and you are not experiencing these or other menopausal symptoms.    Recommendation: Consider stopping the Evening Primrose Oil supplement since you are not experiencing any menopausal symptoms.     Mobility   During our assessment we were happy to see how active you are! We encourage you to keep this up. At the [facility] we focus on how mobility can help you achieve What Matter’s. In general, we recommend all adults be active every day, to the best of their ability.     Other Recommendations  Medical Recommendations:   Stay hydrated with water and non-sugary and non-caffeinated drinks. We recommend eight  "glasses of 8 ounces of water a day.    For your caregivers, we have provided a list of \"Activators\" that may be helpful in identifying and responding to behaviors in individuals with dementia. Recommend CARES Approach training (See handouts)   We do not advise driving.    May consider supplementing nutrition with Ensure, Boost or protein shake powder for missed meals or calories.    Consider ways to add reminders for orientation such as electronic calendars by the bedside, labeled and updated family photos.    Self-study program with [employer]. (See more info from )   Strongly encourage revisiting osteoporosis diagnosis with PCP and recommend treatment. Avoid falls as this may lead to a fracture due to your osteoporosis.    Continue follow up with your Neurologist for management of diagnosis of dementia.    Consider a POLST form filled by your PCP and yourself. (See pink handout)   Dementia  is a syndrome but statistically and for the majority of patients  occurs due  to a more rapid aging of the brain tissue or potentially from injury to certain parts of one's brain (often from such contributing factors as the cumulative effects of alcohol, from one or more ischemic or hemmorhagic stroke(s), from neurodegeneration or long standing with/without suboptimally controlled Hypertension). It is for some of these potential factors as to why I recommend a brain imaging test (Head CT or Brain MRI) be done for the 1st time or in certain circumstances repeated for comparison purposes  as such imaging can suggest one or more factors as to the reason(s) for the person's cognitive/memory changes. In fact, a normal or \"age related\" finding on a brain imaging test in and of itself is useful clinical and objective information to have in the evaluation of a person who has either an acute, evolving or even chronic (months to years) long cognitive/memory complaint.    Social Work Care Plan:    We hear that being " outside in nature hiking brings you julia. We are concerned about the length of time that Stephanie is out in the wilderness without other people and limited access to shade and hydration. While we support Stephanie’s desire for independence and alone time, consider how to do this within a harm reduction framework.     Locate and purchase a medical safety alert watch or other device to assist in managing elopement from the house. There is a private pay cost to this type of service, typically a monthly fee varying from $10-$40/month. Options can be found on; [employer].com, [employer] and [employer]. We hear that you are also looking at gps options for hiking and will be trialing options.     Consider attending [facility] [employer]. They have educational, social, cultural, and recreational programs. $85/year membership with access to all programming. [phone]; [email or web address]/. We think you would specifically enjoy the long distance hiking and Thursday hikers groups.     Consider engaging with Dementia [facility]: Join people living with dementia, family care partners, and interested community members for a weekly conversation about living well with dementia. All sessions are hosted by a person living with dementia and a family care partner.     Dementia Conversations with Clayton and Na: Mondays from 10:00 - 11:30 AM     Dementia Conversations with Aida: Fridays from 1:00 - 2:30 PM    To register, visit [email or web address]     Consider connecting to the [facility] [place] Alzheimer’s Assosication specific to the Younger Onset groups and education. You may find this information to be the most helpful to your specific situation.    https://www.alz.org/help-support/i-have-alz/younger-onset     Caregiving Support   For future reference, we want you to be aware of the following resources that may be helpful to you.     Consider a home  safety evaluation to have safety recommendations made specific to aging. An option is occupational therapist Mini Salazar. She has years of experience doing in-home safety evaluations. You can contact her at [phone], [email or web address]. There is a cost to this service that is not covered by insurance, approximately $200-250.     For caregiver support, contact the Alzheimer’s Association at 1-230.117.6416 or via their website, https://www.alz.org/siobhan. They provide support groups, education and 24 hour support. They also have a respite collins they can help you apply for to use for various services. $1000/year, possibly more depending on needs.     Research an Adult Day Health Program. These services provide socialization, exercise and live entertainment. Some options in our area are:   o [facility] - [address] [place] [address] ([phone])   o [facility] -[address], [place] [address]. [phone]. Expanded hours to come, currently 10:00 a.m. - 3:00 p.m.   o Review the [place] Caregivers Guide enclosed in your packet. It is a wealth of information. You can also find this guide online at https://[facility]adaWyss Institutevers.org/documents/. [facility].org is a comprehensive database of resources created by the [facility] in partnership with the [facility] and the [facility] [employer].   o Consider connecting to Dementia Friendly [place] and join the Dementia Friends Information Session. 12:00-1:30pm on the 3rd Wednesday of every month via Zoom. This group covers the basics of dementia, discuss differences between dementia and normal aging, go through activities to better understand what it’s like to live with dementia, and then review strategies for effective communication with someone living with dementia.  [email or web address]/dementia-friends   o Review the Manual About Living with Dementia via the  Dementia [facility] [place]. This is a thorough resource of information for understanding a dementia diagnosis that was co-authored with people living with dementia and their care partners.  Manual About Living With Dementia  [facility] [place] (daanow.org)    https://daanow.org/pathways-to-well-being-manual/     o Dementia Engagement, Education, and Research (DEER) Program is accepting scholarship applications for CARES Dementia Basics training certifications in English and Lao for family and professional caregivers. This course provides extensive education about careing for those with cognitive challenges. Contact Elías Reyes at [email or web address] or call [phone] for more information.   o You can contact [facility] at [phone] or [facility].org and click the “Request Help” button. [facility] is a direct link to aging services in [place]. A service navigator can answer most questions you have. They provide resources, service navigation and case management free of charge.     Referral Recommendations (to be ordered by your primary care provider)   Counseling/Therapy       To follow up with our recommendation (orders, referrals, med changes, etc) we encourage you to follow with their primary care provider before implementing these changes.      History of Present Illness:   Stephanie is here with her  John and son Jass for a comprehensive geriatric assessment. She is diagnosed with Alzheimers dementia. Symptoms began last Fall 2022. Jass is concerned about diet, hydration and emotional health of Michelle after the loss of some family members. John is concerned about safety especially with regards to Stephanie taking hikes independently.     MEANING: She indicates hiking and family bring her julia. She is a [employer] fan, has two dogs and practices yoga.     FAMILY/FRIENDS: She has three sons. One of her brothers passed away due to suicide 2 years ago and she lost  "her father in the past year. She indicates she has three close girl friends. She stays in contact via text and calls and occasionally does some hiking or in-person socializing with them. She has 2 Bol[place]ian Shepherds, Sonya and Grazyna.     HOME: One story home that they have lived in for 32 years. No modifications. She feels safe in her home.     TYPICAL DAY: Gets up around 8am. Wakes up briefly around 7am to take a dose of schedule medications. Feeds dog and does yoga practice. Breakfast: blueberries, raspberries with cereal and tea. Checks her email on her phone after breakfast. She has not been able to log onto her emails on her computer due to some log in difficulties. Jass indicates that he feels that the emails may not always be positive and may be triggering. Does not eat much for lunch. John then drives her to a hiking trail or she goes for a walk with the dogs. Her hikes may be 2-3 hours long. John picks her up between 4:30pm or 5pm. Dinner is around 6pm. John and Stephanie each make their own dinner independently. She typically eats a salad or Latvian food and some mandarins. She does not drink enough water, per family. She enjoys sports and will watch her favorite teams on TV or her phone. Jass indicates Stephanie does a lot of \"tiding and re-organizing around the home\". She goes to sleep around 10pm. Unclear if she gets up in the middle of the night. She is very routine-oriented.     EDUCATION: She completed High School and worked as a .     History provided by:Patient, Spouse, and Child(bonilla)  Patient is a Limited historian    67 y.o. female with Past Medical History:  No date: Anxiety  No date: CATARACT      Comment:  megan IOL  No date: Chronic thyroiditis      Comment:   + US / neg ab  No date: Depression      Comment:  mood swings  No date: Dyslipidemia  No date: Gynecological disorder  No date: Hypothyroidism  No date: Keratoconjunctivitis sicca  No date: multiple thyroid nodules      " "Comment:  subcentimeter / 2016 heterogeneous okay  2005: Osteopenia  2016: S/P hysterectomy with oophorectomy      Comment:  prolapse.  11/27/2017: Urinary incontinence  No date: Urinary tract infection, site not specified      Review of System:   ROS   Hearing - denies limitations   Vision - denies limitations, had cataracts removed \"a few years ago\"  Appetite - declined although does have a sweet tooth that is new. Enjoys cookies and ice cream  Weight Loss  Diet - vegetarian diet  Exercise  Dysphagia - no   Memory - impaired, Alzheimer's  Fatigue  Sleep -see HPI  Dental Problems - denies limitations  Constipation  Diarrhea  Urinary Incontinence - possible stress incontinence and nocturia  Falls  Dizziness/lightheadedness  Mood  - son reports depression re family member losses and irritability. Stress level 1/5. Mood reportedly \"good\".   SI - denies  Any personal history of depression/anxiety/psychiatric disorder     Cognitive ROS  Memory concerns - word finding difficulties, speech delay  Advance Directives - yes, not on file  History of TBI -   Hallucinations - denies  Tremor  Rigidity  Gait Changes - slower and mild shuffling  Behavior/personality changes - quieter, frustration, no wandering  Alcohol use  History of PVD/MI/Stroke   FH of dementia - no   Previous labs - see below  Previous Head Imaging -           IADL  Legend:   1- independent  2 - need assistance  3 - unable to complete         Are you still driving? Not currently      Are you able to prepare your own meals and/or cook, need assistance or unable to complete? 1     Are you able to do shopping and errands, need assistance or unable to complete? 2     Are you able to do housekeeping, chores, need assistance or unable to complete? 1     Are you able to do laundry, need assistance or unable to complete? 1     Are you able to manage your medications, need assistance or unable to complete? 1     Are you able to do your own money management, need " assistance or unable to complete? 3     Are you able to use the phone, need assistance or unable to use the phone? 2     ADL     Are you independent, need assistance, or unable to bathe yourself? 1     Are you independent, need assistance, or unable to dress yourself? 1     Are you independent, need assistance, or unable to feed yourself? 1     Are you independent, need assistance, or unable to get up out of a chair or off a bed? 1     Are you independent, need assistance, or unable to use the toilet by yourself? 1     Are you aware when you need to use the toilet? 1     If no, please describe the problem you are experiencing.         Assist Devices: Patient uses:  Cane: No   Walker:No   Wheelchair:No   Amputations:No   Chronic oxygen use: No   CPAP/BIPAP use: No   : Reports urinary leakage during the last 6 months that has not interfered at all with their daily activities or sleep.  If you have a problem with continence, do you wear special garments?  Pads         Fall Screening:   Any falls within the last year? 1  Any injuries associated with the falls? Yes   -If yes, what injury? Just bruising, soreness   Do you worry about falling?No   Do you feel unsteady when standing or walking? Yes   You have symptoms of lightheadedness or dizziness from lying to standing? No      Any throw rugs on floor? Yes   Do you have stairs?No   Do you have handrails on all stairs?   Good lighting in all hallways. Yes   Any difficulty hearing? No   Wears hearing aids:No  Any difficulty with vision? No   Last eye exam: 2 yrs ago         FRAIL SCALE     Fatigue:  How much time during the past 4 weeks did you feel tired:  1=All the time, 2=Most the time, 3=Some of the time, 4=A little of the time,  5=None of the time  Answer: 5  (responses of 1 or 2 are scored as 1 and all others as 0)  SCORE: 0     Resistance:  By yourself and not using aids, do you have any difficulty walking up 10 steps without resting?  1=Yes, 0=No  SCORE: 0      Ambulation:  By yourself and not using aids, do you have any difficulty walking a couple of blocks?  1=Yes, 0=No  SCORE:0        Loss of Weight over last year:   If noted above, one year ago, how much did you weigh:?  ~same   (percent change is computed as: weight 1 year ago- current weight/weight 1 year ago. X 100.  (more than 5% weight loss is scored as 1, and less than 5% is 0)     Score: 0     Total Score: 0     Scorin =  Robust Health  1-2=Pre-frail  3-5=Frail     Ask if they have been admitted to the hospital in the past three month: No         Mini Cog Clock-  0/2  Time 1:25         Depression Screen - PHQ- 9   0 = Not at all, 1 = Several days,  2 = More than half the days,  3 = Nearly every day      Little interest or pleasure in doing things? 0  Feeling down, depressed , or hopeless? 0  Trouble falling or staying asleep, or sleeping too much?  0  Feeling tired or having little energy? 0  Poor appetite or overeating?  0  Feeling bad about yourself - or that you are a failure or have let yourself or your family down? 0   Trouble concentrating on things, such as reading the newspaper or watching television? 1  Moving or speaking so slowly that other people could have noticed.  Or the opposite - being so fidgety or restless that you have been moving around a lot more than usual? 0  Thoughts that you would be better off dead, or of hurting yourself? 0  Patient Health Questionnaire Score:  1     Anxiety Screen/ARNEL-7 Questionnaire  0 = Not at all, 1 = Several days,  2 = More than half the days,  3 = Nearly every day      Feeling nervous, anxious, or on edge: 0  Not being able to stop or control worryin                            Worrying too much about different things: 0  Trouble relaxin  Being so restless that it's hard to sit still: 1  Becoming easily annoyed or irritable: 0  Feeling afraid as if something awful might happen: 0  Total Score :  1                                                                Interpretation of ARNEL 7 Total Score   Score Severity :  0-4 No Anxiety   5-9 Mild Anxiety  10-14 Moderate Anxiety  15-21 Severe Anxiety       I reviewed the patient's MiniCog, PHQ9, GAD7, falls, and frailty screens as documented in the Medical Assistant's note.       Social History     Socioeconomic History    Marital status:      Spouse name: Not on file    Number of children: Not on file    Years of education: Not on file    Highest education level: Not on file   Occupational History    Not on file   Tobacco Use    Smoking status: Never    Smokeless tobacco: Never   Vaping Use    Vaping Use: Never used   Substance and Sexual Activity    Alcohol use: No    Drug use: No    Sexual activity: Not Currently     Partners: Male     Birth control/protection: Post-Menopausal   Other Topics Concern    Not on file   Social History Narrative    Not on file     Social Determinants of Health     Financial Resource Strain: Low Risk  (5/22/2023)    Overall Financial Resource Strain (CARDIA)     Difficulty of Paying Living Expenses: Not very hard   Food Insecurity: No Food Insecurity (5/22/2023)    Hunger Vital Sign     Worried About Running Out of Food in the Last Year: Never true     Ran Out of Food in the Last Year: Never true   Transportation Needs: No Transportation Needs (5/22/2023)    PRAPARE - Transportation     Lack of Transportation (Medical): No     Lack of Transportation (Non-Medical): No   Physical Activity: Sufficiently Active (5/22/2023)    Exercise Vital Sign     Days of Exercise per Week: 7 days     Minutes of Exercise per Session: 150+ min   Stress: No Stress Concern Present (5/22/2023)    [employer] - Occupational Stress Questionnaire     Feeling of Stress : Only a little   Recent Concern: Stress - Stress Concern Present (5/17/2023)    [employer] - Occupational Stress Questionnaire     Feeling of Stress : Rather much   Social Connections:  Moderately Isolated (5/22/2023)    Social Connection and Isolation Panel [NHANES]     Frequency of Communication with Friends and Family: More than three times a week     Frequency of Social Gatherings with Friends and Family: Twice a week     Attends Rastafarian Services: Never     Active Member of Clubs or Organizations: No     Attends Club or Organization Meetings: Never     Marital Status:    Intimate Partner Violence: Not At Risk (5/22/2023)    Humiliation, Afraid, Rape, and Kick questionnaire     Fear of Current or Ex-Partner: No     Emotionally Abused: No     Physically Abused: No     Sexually Abused: No   Housing Stability: Low Risk  (5/22/2023)    Housing Stability Vital Sign     Unable to Pay for Housing in the Last Year: No     Number of Places Lived in the Last Year: 1     Unstable Housing in the Last Year: No       Family History   Problem Relation Age of Onset    Hyperlipidemia Mother     Hyperlipidemia Father     Heart Disease Father         PCI    Stroke Maternal Grandmother 87    Cancer Paternal Grandfather         Stomach    Cancer Maternal Aunt 62        Breast    Cancer Paternal Uncle         digestive, unknown type    Psychiatric Illness Brother        ALLERGIES  Morphine      Current Outpatient Medications:     Lutein 40 MG Cap, Take  by mouth every day., Disp: , Rfl:     Iron-Vitamin C  MG Tab, Take  by mouth every day., Disp: , Rfl:     Evening Primrose Oil 1000 MG Cap, Take  by mouth every day., Disp: , Rfl:     NON SPECIFIED, every day. Neuro Q brain health & Youthful brain, Disp: , Rfl:     vitamin k 100 MCG tablet, Take 100 mcg by mouth every day., Disp: , Rfl:     Cholecalciferol (VITAMIN D3 PO), Take 50 mcg by mouth 3 times a week., Disp: , Rfl:     multivitamin (THERAGRAN) Tab, Take 1 Tab by mouth every day., Disp: , Rfl:     Calcium Citrate-Vitamin D (CALCIUM + D PO), Take 1 Tablet by mouth every day. 600mg/12.5 mcg, Disp: , Rfl:     levothyroxine (SYNTHROID) 75 MCG Tab,  "TAKE 1 TABLET BY MOUTH EVERY DAY IN THE MORNING ON AN EMPTY STOMACH, Disp: 90 Tablet, Rfl: 0      Physical Exam:   /76 (BP Location: Left arm, Patient Position: Sitting, BP Cuff Size: Adult)   Pulse (!) 54   Temp 36.4 °C (97.6 °F) (Tympanic)   Resp 12   Ht 1.588 m (5' 2.5\")   Wt 52.7 kg (116 lb 3.2 oz)   SpO2 97%   BMI 20.91 kg/m²   Physical Exam  HENT:      Head: Normocephalic.   Eyes:      Pupils: Pupils are equal, round, and reactive to light.   Pulmonary:      Effort: Pulmonary effort is normal.      Breath sounds: Normal breath sounds.   Skin:     General: Skin is warm and dry.   Neurological:      Mental Status: She is alert. Mental status is at baseline. She is disoriented.   Psychiatric:         Behavior: Behavior is cooperative.         Thought Content: Thought content does not include homicidal or suicidal plan.         Cognition and Memory: Memory is impaired. She exhibits impaired recent memory.       Labs:  CBC:  Lab Results   Component Value Date/Time    WBC 6.9 05/24/2022 01:19 PM    RBC 4.11 (L) 05/24/2022 01:19 PM    HEMOGLOBIN 12.4 05/24/2022 01:19 PM    HEMATOCRIT 37.3 05/24/2022 01:19 PM    MCV 90.8 05/24/2022 01:19 PM    MCH 30.2 05/24/2022 01:19 PM    MCHC 33.2 (L) 05/24/2022 01:19 PM    MPV 9.4 05/24/2022 01:19 PM    NEUTSPOLYS 71.30 05/24/2022 01:19 PM    LYMPHOCYTES 19.60 (L) 05/24/2022 01:19 PM    MONOCYTES 7.90 05/24/2022 01:19 PM    EOSINOPHILS 0.30 05/24/2022 01:19 PM    BASOPHILS 0.60 05/24/2022 01:19 PM      BMP:   Lab Results   Component Value Date/Time    SODIUM 138 05/24/2022 01:19 PM    POTASSIUM 4.1 05/24/2022 01:19 PM    CHLORIDE 102 05/24/2022 01:19 PM    CO2 26 05/24/2022 01:19 PM    GLUCOSE 104 (H) 05/24/2022 01:19 PM    BUN 23 (H) 05/24/2022 01:19 PM    CREATININE 0.83 05/24/2022 01:19 PM      LFT:   Lab Results   Component Value Date/Time    ASTSGOT 19 05/24/2022 01:19 PM    ALTSGPT 12 05/24/2022 01:19 PM    TBILIRUBIN 0.6 05/24/2022 01:19 PM    ALBUMIN 4.1 " 05/24/2022 01:19 PM    TOTPROTEIN 7.3 05/24/2022 01:19 PM    ALKPHOSPHAT 95 05/24/2022 01:19 PM      Calcium:   Lab Results   Component Value Date/Time    CALCIUM 9.3 05/24/2022 01:19 PM     VIT D:   Lab Results   Component Value Date/Time    25HYDROXY 55 05/24/2022 1316     TSH:   Lab Results   Component Value Date/Time    TSHULTRASEN 0.015 (L) 10/18/2022 0937     THYROXINE (T4):   Lab Results   Component Value Date/Time    FREET4 1.87 (H) 10/18/2022 0937     A1c:   Lab Results   Component Value Date/Time    HBA1C 5.6 08/02/2021 1029    AVGLUC 114 08/02/2021 1029     Lipids:   Lab Results   Component Value Date/Time    CHOLSTRLTOT 173 05/24/2022 01:19 PM    TRIGLYCERIDE 63 05/24/2022 01:19 PM    HDL 69 05/24/2022 01:19 PM    LDL 91 05/24/2022 01:19 PM     Still need B12, HIV, RPR    Imaging:      My total time spent caring for the patient on the day of the encounter was 90 minutes. This visit was done concurrently with a , pharmacist and myself.     This does not include time spent on separately billable procedures/tests.    This note was partially dictated with voice recognition software, for any confusion please do not hesitate to contact me.

## 2023-05-22 DIAGNOSIS — E06.3 HYPOTHYROIDISM, ACQUIRED, AUTOIMMUNE: ICD-10-CM

## 2023-05-22 SDOH — SOCIAL STABILITY: SOCIAL INSECURITY: WITHIN THE LAST YEAR, HAVE YOU BEEN HUMILIATED OR EMOTIONALLY ABUSED IN OTHER WAYS BY YOUR PARTNER OR EX-PARTNER?: NO

## 2023-05-22 SDOH — ECONOMIC STABILITY: TRANSPORTATION INSECURITY
IN THE PAST 12 MONTHS, HAS LACK OF TRANSPORTATION KEPT YOU FROM MEETINGS, WORK, OR FROM GETTING THINGS NEEDED FOR DAILY LIVING?: NO

## 2023-05-22 SDOH — HEALTH STABILITY: MENTAL HEALTH: HOW OFTEN DO YOU HAVE A DRINK CONTAINING ALCOHOL?: NEVER

## 2023-05-22 SDOH — SOCIAL STABILITY: SOCIAL NETWORK: HOW OFTEN DO YOU GET TOGETHER WITH FRIENDS OR RELATIVES?: TWICE A WEEK

## 2023-05-22 SDOH — SOCIAL STABILITY: SOCIAL INSECURITY: WITHIN THE LAST YEAR, HAVE YOU BEEN AFRAID OF YOUR PARTNER OR EX-PARTNER?: NO

## 2023-05-22 SDOH — SOCIAL STABILITY: SOCIAL NETWORK: ARE YOU MARRIED, WIDOWED, DIVORCED, SEPARATED, NEVER MARRIED, OR LIVING WITH A PARTNER?: MARRIED

## 2023-05-22 SDOH — ECONOMIC STABILITY: FOOD INSECURITY: WITHIN THE PAST 12 MONTHS, THE FOOD YOU BOUGHT JUST DIDN'T LAST AND YOU DIDN'T HAVE MONEY TO GET MORE.: NEVER TRUE

## 2023-05-22 SDOH — HEALTH STABILITY: MENTAL HEALTH
STRESS IS WHEN SOMEONE FEELS TENSE, NERVOUS, ANXIOUS, OR CAN'T SLEEP AT NIGHT BECAUSE THEIR MIND IS TROUBLED. HOW STRESSED ARE YOU?: ONLY A LITTLE

## 2023-05-22 SDOH — ECONOMIC STABILITY: HOUSING INSECURITY: IN THE LAST 12 MONTHS, HOW MANY PLACES HAVE YOU LIVED?: 1

## 2023-05-22 SDOH — SOCIAL STABILITY: SOCIAL NETWORK
DO YOU BELONG TO ANY CLUBS OR ORGANIZATIONS SUCH AS CHURCH GROUPS UNIONS, FRATERNAL OR ATHLETIC GROUPS, OR SCHOOL GROUPS?: NO

## 2023-05-22 SDOH — ECONOMIC STABILITY: FOOD INSECURITY: WITHIN THE PAST 12 MONTHS, YOU WORRIED THAT YOUR FOOD WOULD RUN OUT BEFORE YOU GOT MONEY TO BUY MORE.: NEVER TRUE

## 2023-05-22 SDOH — HEALTH STABILITY: PHYSICAL HEALTH: ON AVERAGE, HOW MANY DAYS PER WEEK DO YOU ENGAGE IN MODERATE TO STRENUOUS EXERCISE (LIKE A BRISK WALK)?: 7 DAYS

## 2023-05-22 SDOH — ECONOMIC STABILITY: INCOME INSECURITY: HOW HARD IS IT FOR YOU TO PAY FOR THE VERY BASICS LIKE FOOD, HOUSING, MEDICAL CARE, AND HEATING?: NOT VERY HARD

## 2023-05-22 SDOH — ECONOMIC STABILITY: TRANSPORTATION INSECURITY
IN THE PAST 12 MONTHS, HAS THE LACK OF TRANSPORTATION KEPT YOU FROM MEDICAL APPOINTMENTS OR FROM GETTING MEDICATIONS?: NO

## 2023-05-22 SDOH — SOCIAL STABILITY: SOCIAL NETWORK: HOW OFTEN DO YOU ATTEND CHURCH OR RELIGIOUS SERVICES?: NEVER

## 2023-05-22 SDOH — ECONOMIC STABILITY: INCOME INSECURITY: IN THE LAST 12 MONTHS, WAS THERE A TIME WHEN YOU WERE NOT ABLE TO PAY THE MORTGAGE OR RENT ON TIME?: NO

## 2023-05-22 SDOH — HEALTH STABILITY: PHYSICAL HEALTH: ON AVERAGE, HOW MANY MINUTES DO YOU ENGAGE IN EXERCISE AT THIS LEVEL?: 150+ MIN

## 2023-05-22 SDOH — HEALTH STABILITY: MENTAL HEALTH: HOW OFTEN DO YOU HAVE 6 OR MORE DRINKS ON ONE OCCASION?: NEVER

## 2023-05-22 SDOH — SOCIAL STABILITY: SOCIAL NETWORK: HOW OFTEN DO YOU ATTENT MEETINGS OF THE CLUB OR ORGANIZATION YOU BELONG TO?: NEVER

## 2023-05-22 SDOH — HEALTH STABILITY: MENTAL HEALTH: HOW MANY STANDARD DRINKS CONTAINING ALCOHOL DO YOU HAVE ON A TYPICAL DAY?: PATIENT DOES NOT DRINK

## 2023-05-22 ASSESSMENT — LIFESTYLE VARIABLES
AUDIT-C TOTAL SCORE: 0
SKIP TO QUESTIONS 9-10: 1

## 2023-05-24 RX ORDER — LEVOTHYROXINE SODIUM 0.07 MG/1
TABLET ORAL
Qty: 90 TABLET | Refills: 0 | Status: SHIPPED | OUTPATIENT
Start: 2023-05-24 | End: 2023-06-09 | Stop reason: SDUPTHER

## 2023-06-01 ENCOUNTER — HOSPITAL ENCOUNTER (OUTPATIENT)
Dept: LAB | Facility: MEDICAL CENTER | Age: 68
End: 2023-06-01
Attending: FAMILY MEDICINE
Payer: MEDICARE

## 2023-06-01 DIAGNOSIS — R79.0 LOW FERRITIN: ICD-10-CM

## 2023-06-01 DIAGNOSIS — E55.9 VITAMIN D DEFICIENCY: ICD-10-CM

## 2023-06-01 DIAGNOSIS — E06.3 HYPOTHYROIDISM, ACQUIRED, AUTOIMMUNE: ICD-10-CM

## 2023-06-01 LAB
ALBUMIN SERPL BCP-MCNC: 4.4 G/DL (ref 3.2–4.9)
ALBUMIN/GLOB SERPL: 1.5 G/DL
ALP SERPL-CCNC: 86 U/L (ref 30–99)
ALT SERPL-CCNC: 15 U/L (ref 2–50)
ANION GAP SERPL CALC-SCNC: 12 MMOL/L (ref 7–16)
AST SERPL-CCNC: 20 U/L (ref 12–45)
BILIRUB SERPL-MCNC: 0.5 MG/DL (ref 0.1–1.5)
BUN SERPL-MCNC: 17 MG/DL (ref 8–22)
CALCIUM ALBUM COR SERPL-MCNC: 9.3 MG/DL (ref 8.5–10.5)
CALCIUM SERPL-MCNC: 9.6 MG/DL (ref 8.5–10.5)
CHLORIDE SERPL-SCNC: 101 MMOL/L (ref 96–112)
CO2 SERPL-SCNC: 27 MMOL/L (ref 20–33)
CREAT SERPL-MCNC: 0.91 MG/DL (ref 0.5–1.4)
FASTING STATUS PATIENT QL REPORTED: NORMAL
GFR SERPLBLD CREATININE-BSD FMLA CKD-EPI: 69 ML/MIN/1.73 M 2
GLOBULIN SER CALC-MCNC: 3 G/DL (ref 1.9–3.5)
GLUCOSE SERPL-MCNC: 93 MG/DL (ref 65–99)
POTASSIUM SERPL-SCNC: 4 MMOL/L (ref 3.6–5.5)
PROT SERPL-MCNC: 7.4 G/DL (ref 6–8.2)
SODIUM SERPL-SCNC: 140 MMOL/L (ref 135–145)

## 2023-06-01 PROCEDURE — 84439 ASSAY OF FREE THYROXINE: CPT | Mod: 91

## 2023-06-01 PROCEDURE — 80053 COMPREHEN METABOLIC PANEL: CPT

## 2023-06-01 PROCEDURE — 84439 ASSAY OF FREE THYROXINE: CPT

## 2023-06-01 PROCEDURE — 84443 ASSAY THYROID STIM HORMONE: CPT | Mod: 91

## 2023-06-01 PROCEDURE — 84443 ASSAY THYROID STIM HORMONE: CPT

## 2023-06-01 PROCEDURE — 82728 ASSAY OF FERRITIN: CPT

## 2023-06-01 PROCEDURE — 82306 VITAMIN D 25 HYDROXY: CPT

## 2023-06-01 PROCEDURE — 36415 COLL VENOUS BLD VENIPUNCTURE: CPT

## 2023-06-02 LAB
25(OH)D3 SERPL-MCNC: 65 NG/ML (ref 30–100)
FERRITIN SERPL-MCNC: 68.5 NG/ML (ref 10–291)
T4 FREE SERPL-MCNC: 1.23 NG/DL (ref 0.93–1.7)
T4 FREE SERPL-MCNC: 1.24 NG/DL (ref 0.93–1.7)
TSH SERPL DL<=0.005 MIU/L-ACNC: 0.58 UIU/ML (ref 0.38–5.33)
TSH SERPL DL<=0.005 MIU/L-ACNC: 0.58 UIU/ML (ref 0.38–5.33)

## 2023-06-09 ENCOUNTER — OFFICE VISIT (OUTPATIENT)
Dept: MEDICAL GROUP | Age: 68
End: 2023-06-09
Payer: MEDICARE

## 2023-06-09 VITALS
HEART RATE: 77 BPM | TEMPERATURE: 97.7 F | HEIGHT: 63 IN | BODY MASS INDEX: 20.52 KG/M2 | SYSTOLIC BLOOD PRESSURE: 114 MMHG | OXYGEN SATURATION: 98 % | DIASTOLIC BLOOD PRESSURE: 74 MMHG | WEIGHT: 115.8 LBS

## 2023-06-09 DIAGNOSIS — Z13.6 SCREENING FOR CARDIOVASCULAR CONDITION: ICD-10-CM

## 2023-06-09 DIAGNOSIS — M81.0 AGE-RELATED OSTEOPOROSIS WITHOUT CURRENT PATHOLOGICAL FRACTURE: ICD-10-CM

## 2023-06-09 DIAGNOSIS — E06.3 HYPOTHYROIDISM, ACQUIRED, AUTOIMMUNE: ICD-10-CM

## 2023-06-09 DIAGNOSIS — R73.01 IMPAIRED FASTING BLOOD SUGAR: ICD-10-CM

## 2023-06-09 DIAGNOSIS — F02.A0 MILD LATE ONSET ALZHEIMER'S DEMENTIA, UNSPECIFIED WHETHER BEHAVIORAL, PSYCHOTIC, OR MOOD DISTURBANCE OR ANXIETY (HCC): ICD-10-CM

## 2023-06-09 DIAGNOSIS — G30.1 MILD LATE ONSET ALZHEIMER'S DEMENTIA, UNSPECIFIED WHETHER BEHAVIORAL, PSYCHOTIC, OR MOOD DISTURBANCE OR ANXIETY (HCC): ICD-10-CM

## 2023-06-09 PROBLEM — R79.0 LOW FERRITIN: Status: RESOLVED | Noted: 2022-07-13 | Resolved: 2023-06-09

## 2023-06-09 PROBLEM — N95.2 ATROPHIC VAGINITIS: Status: RESOLVED | Noted: 2018-03-09 | Resolved: 2023-06-09

## 2023-06-09 PROCEDURE — 3078F DIAST BP <80 MM HG: CPT | Performed by: FAMILY MEDICINE

## 2023-06-09 PROCEDURE — 3074F SYST BP LT 130 MM HG: CPT | Performed by: FAMILY MEDICINE

## 2023-06-09 PROCEDURE — 99214 OFFICE O/P EST MOD 30 MIN: CPT | Performed by: FAMILY MEDICINE

## 2023-06-09 RX ORDER — ALENDRONATE SODIUM 70 MG/1
70 TABLET ORAL
Qty: 12 TABLET | Refills: 3 | Status: SHIPPED | OUTPATIENT
Start: 2023-06-09 | End: 2023-11-02 | Stop reason: SDUPTHER

## 2023-06-09 RX ORDER — LEVOTHYROXINE SODIUM 0.07 MG/1
75 TABLET ORAL
Qty: 90 TABLET | Refills: 3 | Status: SHIPPED | OUTPATIENT
Start: 2023-06-09 | End: 2023-11-07

## 2023-06-09 RX ORDER — UBIDECARENONE 75 MG
100 CAPSULE ORAL DAILY
COMMUNITY

## 2023-06-09 ASSESSMENT — FIBROSIS 4 INDEX: FIB4 SCORE: 1.26

## 2023-06-09 NOTE — PROGRESS NOTES
Subjective:   CC: dementia follow up     HPI:     Lidia Fitzpatrick is a 67 y.o. female, established patient of the clinic.     Patient was previously diagnosed with Alzheimer's dementia.   She is working with Dr. Huntley and John.   She is not taking any medications for this.   She was previously diagnosed with osteoporosis. She declined treatment, but now she would like to take medications for this condition.   Negative history of fall or bone fracture.  Patient has chronic hypothyroidism, she was previously taking levothyroxine 75 mcg daily.  She decided to stop taking this medication a few days ago for unclear reason.  Her son is trying to convince her to restart this medication.  She is otherwise feeling well.  She is active and try to exercise regularly.  Negative history of illicit drug, alcohol, tobacco abuse.  Patient is chronic vegetarian.  She is taking iron and multiple vitamins.  She is trying to increase protein in her diet.  She wishes to have OT evaluation for driving.  Her neurologist and [facility] did not recommend driving at this time.  However, patient insists to go to driving evaluation.    Current medicines (including changes today)  Current Outpatient Medications   Medication Sig Dispense Refill    cyanocobalamin (VITAMIN B-12) 100 MCG Tab Take 100 mcg by mouth every day.      Nutritional Supplements (NUTRITIONAL SUPPLEMENT PO) Take  by mouth. Better bladder      alendronate (FOSAMAX) 70 MG Tab Take 1 Tablet by mouth every 7 days. 12 Tablet 3    levothyroxine (SYNTHROID) 75 MCG Tab Take 1 Tablet by mouth every morning on an empty stomach. 90 Tablet 3    NON SPECIFIED every day. Neuro Q brain health & Youthful brain      Cholecalciferol (VITAMIN D3 PO) Take 50 mcg by mouth 3 times a week.      multivitamin (THERAGRAN) Tab Take 1 Tab by mouth every day.      Calcium Citrate-Vitamin D (CALCIUM + D PO) Take 1 Tablet by mouth every day. 600mg/12.5 mcg       No current  "facility-administered medications for this visit.     She  has a past medical history of Anxiety, CATARACT, Chronic thyroiditis, Depression, Dyslipidemia, Gynecological disorder, Hypothyroidism, Keratoconjunctivitis sicca, multiple thyroid nodules, Osteopenia (2005), S/P hysterectomy with oophorectomy (2016), Urinary incontinence (11/27/2017), and Urinary tract infection, site not specified.    She has no past medical history of Addisons disease (HCC), Adrenal disorder, Allergy, Anemia, Arthritis, Blood transfusion, Clotting disorder, COPD, Cushings syndrome, GERD (gastroesophageal reflux disease), Goiter, Headache(784.0), HIV (human immunodeficiency virus infection), IBD (inflammatory bowel disease), Meningitis, Migraine, Muscle disorder, Parathyroid disorder (HCC), Pituitary disease (HCC), Substance abuse (HCC), or Ulcer.    I reviewed patient's problem list, allergies, medications, family hx, social hx with patient and update EPIC.        Objective:     /74 (BP Location: Right arm, Patient Position: Sitting, BP Cuff Size: Adult)   Pulse 77   Temp 36.5 °C (97.7 °F) (Temporal)   Ht 1.588 m (5' 2.5\")   Wt 52.5 kg (115 lb 12.8 oz)   SpO2 98%  Body mass index is 20.84 kg/m².    Physical Exam:  Constitutional: awake, alert, in no distress.  Skin: Warm, dry, good turgor, no rashes, bruises, ulcers in visible areas.  Eye: conjunctiva clear, lids neg for edema or lesions.  ENMT: TM and auditory canals wnl.    Neck: Trachea midline, no masses, no thyromegaly. No cervical or supraclavicular lymphadenopathy  Respiratory: Unlabored respiratory effort, lungs clear to auscultation, no wheezes, no rales.  Cardiovascular: Normal S1, S2, no murmur, no pedal edema.  Psych: Oriented x3, affect and mood wnl, intact judgement and insight.       Assessment and Plan:   The following treatment plan was discussed    1. Age-related osteoporosis without current pathological fracture   - Vitamin D: 2000 units per day, maintain " serum vitamin D level > 30   - Calcium 600 mg BID  - Weight-bearing, balance, resistance exercises   - maintain healthy active lifestyle  - avoid or stop smoking  - Limit alcohol consumption to one drink per day  - pt was counseled on fall prevention    - home fall-proofing information provided.    - alendronate (FOSAMAX) 70 MG Tab; Take 1 Tablet by mouth every 7 days.  Dispense: 12 Tablet; Refill: 3    2. Mild late onset Alzheimer's dementia, unspecified whether behavioral, psychotic, or mood disturbance or anxiety (HCC)  - Referral to Occupational Therapy    3. Hypothyroidism, acquired, autoimmune  Chronic, recommend restarting thyroxine 75 mcg daily.  Her recent thyroid function test was normal.  - levothyroxine (SYNTHROID) 75 MCG Tab; Take 1 Tablet by mouth every morning on an empty stomach.  Dispense: 90 Tablet; Refill: 3    4.  Impaired fasting blood sugar  - Comp Metabolic Panel; Future  - CBC WITH DIFFERENTIAL; Future  - HEMOGLOBIN A1C; Future    6. Screening for cardiovascular condition  - Lipid Profile; Future      Ly KIEL Chao M.D.      Followup: Return in about 6 months (around 12/9/2023) for Multiple issues.    Please note that this dictation was created using voice recognition software. I have made every reasonable attempt to correct obvious errors, but I expect that there are errors of grammar and possibly content that I did not discover before finalizing the note.

## 2023-06-16 ENCOUNTER — TELEPHONE (OUTPATIENT)
Dept: INTERNAL MEDICINE | Facility: OTHER | Age: 68
End: 2023-06-16
Payer: MEDICARE

## 2023-06-16 NOTE — TELEPHONE ENCOUNTER
Byron f/u CGA call placed to spouse John, abbey Regan has also been in contact with Ava and MANDO team regarding various things. John reports the visit was impressive and awesome and Michelle needed to hear this. Has been out of town and admits to not having had time to review the recommendation packet and return surveys yet. On 1 yr f/u list. Nothing further needed at this time.

## 2023-07-24 ENCOUNTER — PATIENT MESSAGE (OUTPATIENT)
Dept: MEDICAL GROUP | Age: 68
End: 2023-07-24
Payer: MEDICARE

## 2023-07-24 DIAGNOSIS — F02.A0 MILD LATE ONSET ALZHEIMER'S DEMENTIA, UNSPECIFIED WHETHER BEHAVIORAL, PSYCHOTIC, OR MOOD DISTURBANCE OR ANXIETY (HCC): ICD-10-CM

## 2023-07-24 DIAGNOSIS — G30.1 MILD LATE ONSET ALZHEIMER'S DEMENTIA, UNSPECIFIED WHETHER BEHAVIORAL, PSYCHOTIC, OR MOOD DISTURBANCE OR ANXIETY (HCC): ICD-10-CM

## 2023-10-31 DIAGNOSIS — E55.9 VITAMIN D DEFICIENCY: ICD-10-CM

## 2023-10-31 DIAGNOSIS — E03.4 HYPOTHYROIDISM DUE TO ACQUIRED ATROPHY OF THYROID: ICD-10-CM

## 2023-11-01 ENCOUNTER — TELEPHONE (OUTPATIENT)
Dept: ENDOCRINOLOGY | Facility: MEDICAL CENTER | Age: 68
End: 2023-11-01
Payer: MEDICARE

## 2023-11-01 NOTE — TELEPHONE ENCOUNTER
Called patient, left message to completed labs for her upcoming appointment. If labs are not completed prior to the upcoming appointment, patient will need to reschedule so that she can get her labs done.

## 2023-11-02 ENCOUNTER — HOSPITAL ENCOUNTER (OUTPATIENT)
Dept: LAB | Facility: MEDICAL CENTER | Age: 68
End: 2023-11-02
Attending: FAMILY MEDICINE
Payer: MEDICARE

## 2023-11-02 ENCOUNTER — HOSPITAL ENCOUNTER (OUTPATIENT)
Dept: LAB | Facility: MEDICAL CENTER | Age: 68
End: 2023-11-02
Payer: MEDICARE

## 2023-11-02 DIAGNOSIS — Z13.6 SCREENING FOR CARDIOVASCULAR CONDITION: ICD-10-CM

## 2023-11-02 DIAGNOSIS — R73.01 IMPAIRED FASTING BLOOD SUGAR: ICD-10-CM

## 2023-11-02 DIAGNOSIS — M81.0 AGE-RELATED OSTEOPOROSIS WITHOUT CURRENT PATHOLOGICAL FRACTURE: ICD-10-CM

## 2023-11-02 DIAGNOSIS — E03.4 HYPOTHYROIDISM DUE TO ACQUIRED ATROPHY OF THYROID: ICD-10-CM

## 2023-11-02 DIAGNOSIS — E55.9 VITAMIN D DEFICIENCY: ICD-10-CM

## 2023-11-02 LAB
25(OH)D3 SERPL-MCNC: 83 NG/ML (ref 30–100)
ALBUMIN SERPL BCP-MCNC: 4.7 G/DL (ref 3.2–4.9)
ALBUMIN SERPL BCP-MCNC: 4.7 G/DL (ref 3.2–4.9)
ALBUMIN/GLOB SERPL: 1.5 G/DL
ALBUMIN/GLOB SERPL: 1.5 G/DL
ALP SERPL-CCNC: 95 U/L (ref 30–99)
ALP SERPL-CCNC: 96 U/L (ref 30–99)
ALT SERPL-CCNC: 13 U/L (ref 2–50)
ALT SERPL-CCNC: 14 U/L (ref 2–50)
ANION GAP SERPL CALC-SCNC: 8 MMOL/L (ref 7–16)
ANION GAP SERPL CALC-SCNC: 9 MMOL/L (ref 7–16)
AST SERPL-CCNC: 23 U/L (ref 12–45)
AST SERPL-CCNC: 23 U/L (ref 12–45)
BASOPHILS # BLD AUTO: 1.1 % (ref 0–1.8)
BASOPHILS # BLD: 0.06 K/UL (ref 0–0.12)
BILIRUB SERPL-MCNC: 0.4 MG/DL (ref 0.1–1.5)
BILIRUB SERPL-MCNC: 0.5 MG/DL (ref 0.1–1.5)
BUN SERPL-MCNC: 17 MG/DL (ref 8–22)
BUN SERPL-MCNC: 17 MG/DL (ref 8–22)
CALCIUM ALBUM COR SERPL-MCNC: 9.2 MG/DL (ref 8.5–10.5)
CALCIUM ALBUM COR SERPL-MCNC: 9.3 MG/DL (ref 8.5–10.5)
CALCIUM SERPL-MCNC: 9.8 MG/DL (ref 8.5–10.5)
CALCIUM SERPL-MCNC: 9.9 MG/DL (ref 8.5–10.5)
CHLORIDE SERPL-SCNC: 100 MMOL/L (ref 96–112)
CHLORIDE SERPL-SCNC: 99 MMOL/L (ref 96–112)
CHOLEST SERPL-MCNC: 240 MG/DL (ref 100–199)
CO2 SERPL-SCNC: 28 MMOL/L (ref 20–33)
CO2 SERPL-SCNC: 29 MMOL/L (ref 20–33)
CREAT SERPL-MCNC: 0.88 MG/DL (ref 0.5–1.4)
CREAT SERPL-MCNC: 0.9 MG/DL (ref 0.5–1.4)
EOSINOPHIL # BLD AUTO: 0.04 K/UL (ref 0–0.51)
EOSINOPHIL NFR BLD: 0.7 % (ref 0–6.9)
ERYTHROCYTE [DISTWIDTH] IN BLOOD BY AUTOMATED COUNT: 44.8 FL (ref 35.9–50)
EST. AVERAGE GLUCOSE BLD GHB EST-MCNC: 120 MG/DL
FASTING STATUS PATIENT QL REPORTED: NORMAL
GFR SERPLBLD CREATININE-BSD FMLA CKD-EPI: 70 ML/MIN/1.73 M 2
GFR SERPLBLD CREATININE-BSD FMLA CKD-EPI: 72 ML/MIN/1.73 M 2
GLOBULIN SER CALC-MCNC: 3.2 G/DL (ref 1.9–3.5)
GLOBULIN SER CALC-MCNC: 3.2 G/DL (ref 1.9–3.5)
GLUCOSE SERPL-MCNC: 118 MG/DL (ref 65–99)
GLUCOSE SERPL-MCNC: 119 MG/DL (ref 65–99)
HBA1C MFR BLD: 5.8 % (ref 4–5.6)
HCT VFR BLD AUTO: 43.2 % (ref 37–47)
HDLC SERPL-MCNC: 72 MG/DL
HGB BLD-MCNC: 13.8 G/DL (ref 12–16)
IMM GRANULOCYTES # BLD AUTO: 0.01 K/UL (ref 0–0.11)
IMM GRANULOCYTES NFR BLD AUTO: 0.2 % (ref 0–0.9)
LDLC SERPL CALC-MCNC: 143 MG/DL
LYMPHOCYTES # BLD AUTO: 2.33 K/UL (ref 1–4.8)
LYMPHOCYTES NFR BLD: 43.1 % (ref 22–41)
MCH RBC QN AUTO: 29.4 PG (ref 27–33)
MCHC RBC AUTO-ENTMCNC: 31.9 G/DL (ref 32.2–35.5)
MCV RBC AUTO: 91.9 FL (ref 81.4–97.8)
MONOCYTES # BLD AUTO: 0.46 K/UL (ref 0–0.85)
MONOCYTES NFR BLD AUTO: 8.5 % (ref 0–13.4)
NEUTROPHILS # BLD AUTO: 2.51 K/UL (ref 1.82–7.42)
NEUTROPHILS NFR BLD: 46.4 % (ref 44–72)
NRBC # BLD AUTO: 0 K/UL
NRBC BLD-RTO: 0 /100 WBC (ref 0–0.2)
PLATELET # BLD AUTO: 260 K/UL (ref 164–446)
PMV BLD AUTO: 9.9 FL (ref 9–12.9)
POTASSIUM SERPL-SCNC: 4.1 MMOL/L (ref 3.6–5.5)
POTASSIUM SERPL-SCNC: 4.1 MMOL/L (ref 3.6–5.5)
PROT SERPL-MCNC: 7.9 G/DL (ref 6–8.2)
PROT SERPL-MCNC: 7.9 G/DL (ref 6–8.2)
RBC # BLD AUTO: 4.7 M/UL (ref 4.2–5.4)
SODIUM SERPL-SCNC: 136 MMOL/L (ref 135–145)
SODIUM SERPL-SCNC: 137 MMOL/L (ref 135–145)
T4 FREE SERPL-MCNC: 1.04 NG/DL (ref 0.93–1.7)
TRIGL SERPL-MCNC: 123 MG/DL (ref 0–149)
TSH SERPL DL<=0.005 MIU/L-ACNC: 9.75 UIU/ML (ref 0.38–5.33)
WBC # BLD AUTO: 5.4 K/UL (ref 4.8–10.8)

## 2023-11-02 PROCEDURE — 83036 HEMOGLOBIN GLYCOSYLATED A1C: CPT

## 2023-11-02 PROCEDURE — 80053 COMPREHEN METABOLIC PANEL: CPT

## 2023-11-02 PROCEDURE — 36415 COLL VENOUS BLD VENIPUNCTURE: CPT

## 2023-11-02 PROCEDURE — 82306 VITAMIN D 25 HYDROXY: CPT

## 2023-11-02 PROCEDURE — 80053 COMPREHEN METABOLIC PANEL: CPT | Mod: 91

## 2023-11-02 PROCEDURE — 85025 COMPLETE CBC W/AUTO DIFF WBC: CPT

## 2023-11-02 PROCEDURE — 80061 LIPID PANEL: CPT

## 2023-11-02 PROCEDURE — 84443 ASSAY THYROID STIM HORMONE: CPT

## 2023-11-02 PROCEDURE — 84439 ASSAY OF FREE THYROXINE: CPT

## 2023-11-02 RX ORDER — ALENDRONATE SODIUM 70 MG/1
70 TABLET ORAL
Qty: 12 TABLET | Refills: 3 | Status: SHIPPED | OUTPATIENT
Start: 2023-11-02

## 2023-11-03 ENCOUNTER — PATIENT MESSAGE (OUTPATIENT)
Dept: MEDICAL GROUP | Age: 68
End: 2023-11-03
Payer: MEDICARE

## 2023-11-03 DIAGNOSIS — F41.8 SITUATIONAL ANXIETY: ICD-10-CM

## 2023-11-04 DIAGNOSIS — Z12.31 ENCOUNTER FOR SCREENING MAMMOGRAM FOR BREAST CANCER: ICD-10-CM

## 2023-11-06 ENCOUNTER — OFFICE VISIT (OUTPATIENT)
Dept: INTERNAL MEDICINE | Facility: OTHER | Age: 68
End: 2023-11-06
Payer: MEDICARE

## 2023-11-06 VITALS
OXYGEN SATURATION: 97 % | DIASTOLIC BLOOD PRESSURE: 78 MMHG | WEIGHT: 119.8 LBS | HEART RATE: 57 BPM | SYSTOLIC BLOOD PRESSURE: 116 MMHG | BODY MASS INDEX: 21.56 KG/M2 | TEMPERATURE: 97.5 F

## 2023-11-06 DIAGNOSIS — F02.C0 SEVERE LATE ONSET ALZHEIMER'S DEMENTIA, UNSPECIFIED WHETHER BEHAVIORAL, PSYCHOTIC, OR MOOD DISTURBANCE OR ANXIETY (HCC): ICD-10-CM

## 2023-11-06 DIAGNOSIS — G30.1 SEVERE LATE ONSET ALZHEIMER'S DEMENTIA, UNSPECIFIED WHETHER BEHAVIORAL, PSYCHOTIC, OR MOOD DISTURBANCE OR ANXIETY (HCC): ICD-10-CM

## 2023-11-06 PROCEDURE — 3074F SYST BP LT 130 MM HG: CPT | Performed by: REGISTERED NURSE

## 2023-11-06 PROCEDURE — 3078F DIAST BP <80 MM HG: CPT | Performed by: REGISTERED NURSE

## 2023-11-06 PROCEDURE — 99215 OFFICE O/P EST HI 40 MIN: CPT | Performed by: REGISTERED NURSE

## 2023-11-06 RX ORDER — DIAZEPAM 5 MG/1
TABLET ORAL
Qty: 1 TABLET | Refills: 0 | Status: SHIPPED | OUTPATIENT
Start: 2023-11-06 | End: 2023-11-06

## 2023-11-06 ASSESSMENT — FIBROSIS 4 INDEX: FIB4 SCORE: 1.64

## 2023-11-06 NOTE — PATIENT INSTRUCTIONS
We thank you for taking the time to visit with our team of providers at the [facility]. During the medical visit we completed a Comprehensive Geriatric Assessment with a , pharmacist and medical provider, all trained in Geriatrics. Below are our Age Friendly recommendations using the 4 M's model of care: What Matters most to you, Mind, Medications and Mobility.     Medical Provider Recommendations:    When considering medical recommendations, we encourage you to work in partnership with your primary care provider to implement them.     Enroll in [facility] program    Pharmacist Recommendations:    During our assessment, we reviewed your medications to make sure they are supporting What Matters most to you. When adjusting medications, we generally like to only make one change at a time. Below are some recommendations for you to consider.    No additional recommendations at this time.      Recommendations:    Thank you for sharing portions of your life with the social work team. It has been a pleasure to get to know you. We suggest the following to assist with What Matters to you:    No additional recommendations at this time.     Thank you again for participating in the Comprehensive Geriatric Assessment with our team of providers at the [facility]. Should you have any follow-up questions you can reach our medical assistant at [phone], option [phone].     We encourage you to consult with your primary care provider before implementing these changes and follow-up with them regarding any orders, referrals, med changes, etc. In the case of a medical or psychiatric emergency, call [phone].

## 2023-11-06 NOTE — PROGRESS NOTES
Interdisciplinary Comprehensive Geriatric Assessment  [facility]      Our comprehensive geriatric assessment team is comprised of a medical assistant, medical provider, pharmacist, and , all of whom create a note during the assessment. We provide recommendations modeling that of an Age-Friendly Health System with the 4 Ms of Care (What Matters, Mind, Medications, and Mobility).    We encourage patients to follow-up with their primary care provider prior to implementing the recommendations (orders, referrals, med changes, etc) discussed during the visit today.    Patient Name: Lidia Fitzpatrick  YOB: 1955  Referring Provider: PCP  Referred For: Follow Up  PCP: Deana Chao M.D.     Interdisciplinary Geriatric Consult Team:   Sarai Osuna, MSN, APRN, FNP-BC  Na Hernandez, MSN, APRN, FNP-BC  Ava Barajas, Pharm D, BCGP  Candie Moise, MSW, LCSW  Rosi Vieyra, MSW, LCSW    Chief Complaint   Patient presents with    [place] Comprehensive Geriatric Assessment     CCM enrollment only        What matters most to the patient: Family    Summary of Team Top Recommendations    Medical Provider  Enroll in CCM program.    Pharmacist   1. As previously noted in last visit      As previously noted in last visit.     Diagnoses Related to Current Encounter  No diagnosis found.    History of Present Illness:  Stephanie is here with John for evaluation for our CCM program. Stephanie is diagnosed with moderate to advanced Alzheimer's with significant progression over the past 6 months. John indicates that they are  moving back to [place] for the winter. Jass, (son) is also here via phone and adds that they are working on getting a medic-alert bracelet. Family is also considering home care vs home health aid. They are seeing her PCP next week to discuss any orders needed for these services. John and Stephanie traveled a lot over the past year including to [place] and he admits  "that she got physically lost several times. He now has a Garmin device that he places on her when she goes on hikes.     Stephanie's Alzheimer's has progressed significantly. Family has not accessed adult day programs as previously recommended. They indicate \"we are not there yet\". She has incontinence and wears depends but hygiene has become an issue despite using incontinence undergarments.     They have not completed a POLST form as recommended but have durable power of  forms completed. They spend 6 months out of the year in [place] and are planning on moving back there soon, however Stephanie does not have a PCP or all medical services established in [place].       History provided by: family member (Becca)  Patient is a:Poor historian  Hospitalized in the last three months? no    Relevant Past Medical History -     Review of Systems    Hearing - denies limitations   Vision - denies limitations, had cataracts removed \"a few years ago\"  Appetite - declined although does have a sweet tooth that is new. Enjoys cookies and ice cream  Weight Loss  Diet - vegetarian diet  Exercise  Dysphagia - no   Memory - impaired, Alzheimer's  Fatigue  Sleep -see HPI  Dental Problems - denies limitations  Constipation  Diarrhea  Urinary Incontinence - possible stress incontinence and nocturia  Falls  Dizziness/lightheadedness  Mood  - son reports depression re family member losses and irritability. Stress level 1/5. Mood reportedly \"good\".   SI - denies  Any personal history of depression/anxiety/psychiatric disorder      Cognitive ROS  Memory concerns - word finding difficulties, speech delay  Advance Directives - yes, not on file  History of TBI -   Hallucinations - denies  Tremor  Rigidity  Gait Changes - slower and mild shuffling  Behavior/personality changes - quieter, frustration, no wandering  Alcohol use  History of PVD/MI/Stroke   FH of dementia - no   Previous labs - see below  Previous Head Imaging - yes   "   Vitals  /78   Pulse (!) 57   Temp 36.4 °C (97.5 °F)   Wt 54.3 kg (119 lb 12.8 oz)   SpO2 97%   BMI 21.56 kg/m²     Physical Exam  Physical Exam  Vitals reviewed.   HENT:      Head: Normocephalic.   Cardiovascular:      Rate and Rhythm: Normal rate.   Pulmonary:      Effort: Pulmonary effort is normal.   Skin:     General: Skin is warm.   Neurological:      Mental Status: Mental status is at baseline. She is disoriented.         Medications  Current Outpatient Medications   Medication Sig Refill Last Dispense    alendronate (FOSAMAX) 70 MG Tab Take 1 Tablet by mouth every 7 days. 3 Unknown (outside pharmacy)    Calcium Citrate-Vitamin D (CALCIUM + D PO) Take 1 Tablet by mouth every day. 600mg/12.5 mcg  Unknown (patient-reported)    Cholecalciferol (VITAMIN D3 PO) Take 50 mcg by mouth 3 times a week.  Unknown (patient-reported)    cyanocobalamin (VITAMIN B-12) 100 MCG Tab Take 100 mcg by mouth every day.  Unknown (patient-reported)    diazePAM (VALIUM) 5 MG Tab Take 1 tablet before MRI scan.  Please arrange for patient to be transported home after MRI. 0 Unknown (outside pharmacy)    levothyroxine (SYNTHROID) 75 MCG Tab Take 1 Tablet by mouth every morning on an empty stomach. 3 Unknown (outside pharmacy)    multivitamin (THERAGRAN) Tab Take 1 Tab by mouth every day.  Unknown (patient-reported)    NON SPECIFIED every day. Neuro Q brain health & Youthful brain  Unknown (patient-reported)    Nutritional Supplements (NUTRITIONAL SUPPLEMENT PO) Take  by mouth. Better bladder  Unknown (patient-reported)       Screenings     ADL  Needs assistance with bathing, dressing, food prep and meds    IADL  Needs assistance with finances, housekeeping, daily planning    Fall Risk Assessment  At risk for falls: yes    How many times per week are you exercising? 0     What is your perception of your health? Unable to assess     Frail Scale  Score:       Interpretation of Frail Scale Score  0 = robust   1-2 = pre-frail  3-5  = frail     RUDAS ([place] Dementia Assessment Scale)  A six-item cognitive screening that evaluates the following domains: memory, praxis, language, judgement, drawing, and body orientation. The assessment is designed to minimize the effects of cultural learning and language diversity.     Score:  See last CGA / 30    Interpretation of RUDAS Score  Any score of 22 or less should be considered as possible cognitive impairment.     Dementia Severity Rating Scale  This is a multiple-choice informant reported questionnaire that assesses a variety of functional and cognitive abilities.  Score: NA / 54    Interpretation of DSRS Score  0-18 = mild   19-36 = moderate  37-54 = severe    PHQ 9 Depression Screening  Score:   Unable to assess / 27    Interpretation of PHQ 9 Score  1-4 = No depression  5-9 = Mild depression  10-14 = Moderate depression  15-19 = Moderately severe depression  20-27 = Severe depression    Generalized Anxiety Screen (ARNEL 7)  Score: Unable to assess  / 21    Interpretation of ARNEL 7 Score  0-4 = no anxiety  5-9 = mild anxiety  10-14 = moderate anxiety  15-21 = severe anxiety      Team Recommendations    Medical Provider Recommendations  Enroll in CCM    Pharmacist Recommendations  Please refer to pharmacy assessment for complete recommendations.     Recommendations  Please refer to social work assessment for complete recommendations.       Chronic Care Management program offered at today’s visit: Enrolled - program details provided and enrollment form signed    My total time spent caring for the patient on the day of the encounter was 90 minutes. This visit was done concurrently with a pharmacist and .

## 2023-11-07 ENCOUNTER — OFFICE VISIT (OUTPATIENT)
Dept: ENDOCRINOLOGY | Facility: MEDICAL CENTER | Age: 68
End: 2023-11-07
Payer: MEDICARE

## 2023-11-07 VITALS
BODY MASS INDEX: 20.64 KG/M2 | OXYGEN SATURATION: 97 % | WEIGHT: 120.9 LBS | HEART RATE: 80 BPM | DIASTOLIC BLOOD PRESSURE: 80 MMHG | SYSTOLIC BLOOD PRESSURE: 136 MMHG | HEIGHT: 64 IN

## 2023-11-07 DIAGNOSIS — R73.03 PREDIABETES: ICD-10-CM

## 2023-11-07 DIAGNOSIS — M81.0 POSTMENOPAUSAL OSTEOPOROSIS: ICD-10-CM

## 2023-11-07 DIAGNOSIS — E78.5 DYSLIPIDEMIA: ICD-10-CM

## 2023-11-07 DIAGNOSIS — E03.4 HYPOTHYROIDISM DUE TO ACQUIRED ATROPHY OF THYROID: ICD-10-CM

## 2023-11-07 DIAGNOSIS — E55.9 VITAMIN D DEFICIENCY: ICD-10-CM

## 2023-11-07 PROCEDURE — 99211 OFF/OP EST MAY X REQ PHY/QHP: CPT

## 2023-11-07 PROCEDURE — 99214 OFFICE O/P EST MOD 30 MIN: CPT

## 2023-11-07 PROCEDURE — 3079F DIAST BP 80-89 MM HG: CPT

## 2023-11-07 PROCEDURE — 3075F SYST BP GE 130 - 139MM HG: CPT

## 2023-11-07 RX ORDER — DIAZEPAM 5 MG/1
TABLET ORAL
COMMUNITY
Start: 2023-11-06 | End: 2023-11-10

## 2023-11-07 RX ORDER — LEVOTHYROXINE SODIUM 88 UG/1
88 TABLET ORAL
Qty: 90 TABLET | Refills: 11 | Status: SHIPPED | OUTPATIENT
Start: 2023-11-07

## 2023-11-07 ASSESSMENT — FIBROSIS 4 INDEX: FIB4 SCORE: 1.64

## 2023-11-07 NOTE — PROGRESS NOTES
Chief Complaint: Follow up for the following conditions   HPI:   Lidia Fitzpatrick is a 68 y.o. female        1. Hypothyroidism, acquired:   She reports a family history of Hypothyroidism with her aunt.       She is currently on Levothyroxine 75 mcg daily  She reports Good compliance and takes her thyroid hormone daily before breakfast.    She denies taking any iron, or antacids.   Reports taking calcium in the evening with dinner  Denies taking biotin  Denies taking multivitamins     She was referred to primary care and her last appointment establish care, she was seen back in May-however her thyroid hormone levels at uncontrolled   Ultrasound was ordered on last appointment but patient did not schedule ultrasound    She denies fatigue, weight changes, heat/cold intolerance, bowel/skin changes or CVS symptoms    She denies lumps or enlargement in the neck.     Latest Reference Range & Units 06/01/23 10:45 11/02/23 12:17   TSH 0.380 - 5.330 uIU/mL 0.580  0.580 9.750 (H)   Free T-4 0.93 - 1.70 ng/dL 1.23  1.24 1.04     2. Postmenopausal Osteoporosis:  PCP sent alendronate 70 mg weekly   She exercises every day by hiking when the weather permits  She takes calcium and vitamin D daily, she does not recall the dosages at this time    Dexa scan on 7/20/2021 showed: Lumbar T score of -2.8, left femur T score -2.8  Dexa scan on 7/5/2019 showed: Lumbar T score -2.3, left femur T score -2.4  Dexa scan on 5/12/2017 showed: Lumbar T score -2.2, left femur T score -2.1     Latest Reference Range & Units 11/02/23 12:18   Bun 8 - 22 mg/dL 17   Creatinine 0.50 - 1.40 mg/dL 0.88   GFR (CKD-EPI) >60 mL/min/1.73 m 2 72       3.  Vitamin D deficiency:  Currently taking everyday   Latest Reference Range & Units 11/02/23 12:17   25-Hydroxy   Vitamin D 25 30 - 100 ng/mL 83       4. Prediabetes:  Diet: Healthy in general, except pt consumes a lot of sugars    Exercise: She exercises every day by hiking when the weather permits   Latest  Reference Range & Units 11/02/23 12:18   Glycohemoglobin 4.0 - 5.6 % 5.8 (H)   Estim. Avg Glu mg/dL 120   Fasting Status  Fasting     5. Dyslipidemia:   Diet: Healthy in general   Exercise: She exercises every day by hiking when the weather permits   Latest Reference Range & Units 11/02/23 12:18   Cholesterol,Tot 100 - 199 mg/dL 240 (H)   Triglycerides 0 - 149 mg/dL 123   HDL >=40 mg/dL 72   LDL <100 mg/dL 143 (H)         Patient's medications, allergies, and social histories were reviewed and updated as appropriate.      ROS:     CONS:     No fever, no chills, no weight loss, no fatigue   EYES:      No diplopia, no blurry vision, no redness of eyes, no swelling of eyelids   ENT:    No hearing loss, No ear pain, No sore throat, no dysphagia, no neck swelling   CV:     No chest pain, no palpitations, no claudication, no orthopnea, no PND   PULM:    No SOB, no cough, no hemoptysis, no wheezing    GI:   No nausea, no vomiting, no diarrhea, no constipation, no bloody stools   :  Passing urine well, no dysuria, no hematuria   ENDO:   No polyuria, no polydipsia, no heat intolerance, no cold intolerance   NEURO: No headaches, no dizziness, no convulsions, no tremors   MUSC:  No joint swellings, no arthralgias, no myalgias, no weakness   SKIN:   No rash, no ulcers, no dry skin   PSYCH:   No depression, no anxiety, no difficulty sleeping       Past Medical History:  Patient Active Problem List    Diagnosis Date Noted    Mild late onset Alzheimer's dementia (HCC) 10/11/2022    Vegetarian diet 05/23/2022    Blood donor 12/27/2021    Continuous leakage of urine_declined treatment 05/18/2021    Impaired fasting blood sugar 05/01/2020    S/P MITCH-BSO 03/17/2017    Age-related osteoporosis without current pathological fracture_[facility]     Hypothyroidism_[facility] 07/06/2010       Past Surgical History:  Past Surgical History:   Procedure Laterality Date    CARPAL TUNNEL ENDOSCOPIC Right 11/29/2017    Procedure: CARPAL  TUNNEL ENDOSCOPIC;  Surgeon: Raheem Ennis M.D.;  Location: SURGERY [facility];  Service: Orthopedics    HYSTERECTOMY ROBOTIC  11/21/2016    Procedure: HYSTERECTOMY ROBOTIC SUPRA CERVICAL WITH BILATERAL SALPINGO-OOPHORECTOMY;  Surgeon: Columba Estrada M.D.;  Location: SURGERY [facility];  Service:     COLPOSACROPEXY ROBOTIC  11/21/2016    Procedure: COLPOSACROPEXY ROBOTIC;  Surgeon: Columba Estrada M.D.;  Location: SURGERY [facility];  Service:     CYSTOSCOPY  11/21/2016    Procedure: CYSTOSCOPY;  Surgeon: Columba Estrada M.D.;  Location: SURGERY [facility];  Service:     VAGINAL PERINEAL EXAM REPAIR  11/21/2016    Procedure: VAGINAL PERINEAL EXAM REPAIR FOR PERINEORRHAPHY, LEVATORPLASTY;  Surgeon: Columba Estrada M.D.;  Location: SURGERY [facility];  Service:     ABDOMINAL HYSTERECTOMY TOTAL      BLADDER SUSPENSION      TUBAL COAGULATION LAPAROSCOPIC BILATERAL          Allergies:  Morphine     Current Medications:    Current Outpatient Medications:     alendronate (FOSAMAX) 70 MG Tab, Take 1 Tablet by mouth every 7 days., Disp: 12 Tablet, Rfl: 3    cyanocobalamin (VITAMIN B-12) 100 MCG Tab, Take 100 mcg by mouth every day., Disp: , Rfl:     Nutritional Supplements (NUTRITIONAL SUPPLEMENT PO), Take  by mouth. Better bladder, Disp: , Rfl:     levothyroxine (SYNTHROID) 75 MCG Tab, Take 1 Tablet by mouth every morning on an empty stomach., Disp: 90 Tablet, Rfl: 3    NON SPECIFIED, every day. Neuro Q brain health & Youthful brain, Disp: , Rfl:     Cholecalciferol (VITAMIN D3 PO), Take 50 mcg by mouth 3 times a week., Disp: , Rfl:     multivitamin (THERAGRAN) Tab, Take 1 Tab by mouth every day., Disp: , Rfl:     Calcium Citrate-Vitamin D (CALCIUM + D PO), Take 1 Tablet by mouth every day. 600mg/12.5 mcg, Disp: , Rfl:     Social History:  Social History     Socioeconomic History    Marital status:      Spouse name: Not on file    Number of children: Not on file     Years of education: Not on file    Highest education level: Not on file   Occupational History    Not on file   Tobacco Use    Smoking status: Never    Smokeless tobacco: Never   Vaping Use    Vaping Use: Never used   Substance and Sexual Activity    Alcohol use: No    Drug use: No    Sexual activity: Not Currently     Partners: Male     Birth control/protection: Post-Menopausal   Other Topics Concern    Not on file   Social History Narrative    Not on file     Social Determinants of Health     Financial Resource Strain: Low Risk  (5/22/2023)    Overall Financial Resource Strain (CARDIA)     Difficulty of Paying Living Expenses: Not very hard   Food Insecurity: No Food Insecurity (5/22/2023)    Hunger Vital Sign     Worried About Running Out of Food in the Last Year: Never true     Ran Out of Food in the Last Year: Never true   Transportation Needs: No Transportation Needs (5/22/2023)    PRAPARE - Transportation     Lack of Transportation (Medical): No     Lack of Transportation (Non-Medical): No   Physical Activity: Sufficiently Active (5/22/2023)    Exercise Vital Sign     Days of Exercise per Week: 7 days     Minutes of Exercise per Session: 150+ min   Stress: No Stress Concern Present (5/22/2023)    [employer] - Occupational Stress Questionnaire     Feeling of Stress : Only a little   Recent Concern: Stress - Stress Concern Present (5/17/2023)    [employer] - Occupational Stress Questionnaire     Feeling of Stress : Rather much   Social Connections: Moderately Isolated (5/22/2023)    Social Connection and Isolation Panel [NHANES]     Frequency of Communication with Friends and Family: More than three times a week     Frequency of Social Gatherings with Friends and Family: Twice a week     Attends Anabaptism Services: Never     Active Member of Clubs or Organizations: No     Attends Club or Organization Meetings: Never     Marital Status:   "  Intimate Partner Violence: Not At Risk (5/22/2023)    Humiliation, Afraid, Rape, and Kick questionnaire     Fear of Current or Ex-Partner: No     Emotionally Abused: No     Physically Abused: No     Sexually Abused: No   Housing Stability: Low Risk  (5/22/2023)    Housing Stability Vital Sign     Unable to Pay for Housing in the Last Year: No     Number of Places Lived in the Last Year: 1     Unstable Housing in the Last Year: No        Family History:   Family History   Problem Relation Age of Onset    Hyperlipidemia Mother     Hyperlipidemia Father     Heart Disease Father         PCI    Stroke Maternal Grandmother 87    Cancer Paternal Grandfather         Stomach    Cancer Maternal Aunt 62        Breast    Cancer Paternal Uncle         digestive, unknown type    Psychiatric Illness Brother          PHYSICAL EXAM:   Vital signs: /80 (BP Location: Right arm, Patient Position: Sitting, BP Cuff Size: Adult)   Pulse 80   Ht 1.626 m (5' 4\")   Wt 54.8 kg (120 lb 14.4 oz)   SpO2 97%   BMI 20.75 kg/m²   GENERAL: Well-developed, well-nourished  in no apparent distress.      SKIN: No rashes, lesions. Turgor is normal.    Labs:  Lab Results   Component Value Date/Time    WBC 5.4 11/02/2023 12:18 PM    RBC 4.70 11/02/2023 12:18 PM    HEMOGLOBIN 13.8 11/02/2023 12:18 PM    MCV 91.9 11/02/2023 12:18 PM    MCH 29.4 11/02/2023 12:18 PM    MCHC 31.9 (L) 11/02/2023 12:18 PM    RDW 44.8 11/02/2023 12:18 PM    MPV 9.9 11/02/2023 12:18 PM       Lab Results   Component Value Date/Time    SODIUM 137 11/02/2023 12:18 PM    POTASSIUM 4.1 11/02/2023 12:18 PM    CHLORIDE 100 11/02/2023 12:18 PM    CO2 28 11/02/2023 12:18 PM    ANION 9.0 11/02/2023 12:18 PM    GLUCOSE 118 (H) 11/02/2023 12:18 PM    BUN 17 11/02/2023 12:18 PM    CREATININE 0.88 11/02/2023 12:18 PM    CALCIUM 9.9 11/02/2023 12:18 PM    ASTSGOT 23 11/02/2023 12:18 PM    ALTSGPT 13 11/02/2023 12:18 PM    TBILIRUBIN 0.5 11/02/2023 12:18 PM    ALBUMIN 4.7 11/02/2023 " 12:18 PM    TOTPROTEIN 7.9 11/02/2023 12:18 PM    GLOBULIN 3.2 11/02/2023 12:18 PM    AGRATIO 1.5 11/02/2023 12:18 PM       Lab Results   Component Value Date/Time    CHOLSTRLTOT 154 08/02/2021 1029    TRIGLYCERIDE 54 08/02/2021 1029    HDL 65 08/02/2021 1029    LDL 78 08/02/2021 1029       Lab Results   Component Value Date/Time    TSHULTRASEN 2.400 05/12/2021 1509     Lab Results   Component Value Date/Time    FREET4 1.46 05/12/2021 1509     Lab Results   Component Value Date/Time    FREET3 3.31 07/09/2016 0958     No results found for: THYSTIMIG    Lab Results   Component Value Date/Time    MICROSOMALA 1.1 07/09/2016 0958         Imaging:      ASSESSMENT/PLAN:   1. Hypothyroidism due to acquired atrophy of thyroid  - Clinically stable  - Biochemically unstable  - Levothyroxine increased to 88 mcg daily   - levothyroxine (SYNTHROID) 88 MCG Tab; Take 1 Tablet by mouth every morning on an empty stomach.  Dispense: 90 Tablet; Refill: 11  - TSH; Future  - FREE THYROXINE; Future  - Comp Metabolic Panel; Future    2. Postmenopausal osteoporosis  Stable  Patient agreeable to continue current regimen for osteopenia/osteoporosis with alendronate   Vitamin D supplementation discussed  Weightbearing exercises discussed  Calcium supplementation discussed  Bone density scan will be done again on   this year    Discussed to take alendronate medication with a full glass of water, stay upright for 30 minutes after  Discussed that if she ever needs to have procedure in which the mandible needs to be manipulated by a dentist, she needs to let dentist dentist know that she is on Fosamax     - DS-BONE DENSITY STUDY (DEXA); Future  - CTX COLLAGEN TYPE 1; Future  - SERUM PROTEIN ELECTROPHORESIS; Future  - SPEP W/REFLEX TO MATEO, A, G, M; Future  - PTH INTACT (PTH ONLY); Future    3. Vitamin D deficiency  - Stable  -Continue regimen-see HPI    4. Prediabetes  Unstable  - Exercise for least 150 minutes/week  - Stable hydration  - Maintain  a healthy diet, minimal carbohydrate, portion control   - Referral to Nutrition Services    5. Dyslipidemia  Unstable  - Exercise for least 150 minutes/week  - Stable hydration  - Maintain a healthy diet, minimal carbohydrate, portion control   - Referral to Nutrition Services     Disposition: Make an appointment to follow-up in March   Do your blood work before your next appointment    Thank you kindly for allowing me to participate in the thyroid care plan for this patient.    Beck Vega, A.P.R.N.  11/07/23      CC:   Deana Chao M.D.

## 2023-11-09 ENCOUNTER — PATIENT MESSAGE (OUTPATIENT)
Dept: MEDICAL GROUP | Facility: MEDICAL CENTER | Age: 68
End: 2023-11-09
Payer: MEDICARE

## 2023-11-09 DIAGNOSIS — F41.8 SITUATIONAL ANXIETY: ICD-10-CM

## 2023-11-09 RX ORDER — DIAZEPAM 5 MG/1
TABLET ORAL
Qty: 30 TABLET | Status: CANCELLED
Start: 2023-11-09

## 2023-11-10 RX ORDER — DIAZEPAM 5 MG/1
5 TABLET ORAL EVERY 6 HOURS PRN
Qty: 1 TABLET | Refills: 0 | Status: SHIPPED | OUTPATIENT
Start: 2023-11-10 | End: 2023-12-10

## 2023-11-20 ENCOUNTER — HOSPITAL ENCOUNTER (OUTPATIENT)
Dept: RADIOLOGY | Facility: MEDICAL CENTER | Age: 68
End: 2023-11-20
Attending: FAMILY MEDICINE
Payer: MEDICARE

## 2023-11-20 DIAGNOSIS — G30.1 MILD LATE ONSET ALZHEIMER'S DEMENTIA, UNSPECIFIED WHETHER BEHAVIORAL, PSYCHOTIC, OR MOOD DISTURBANCE OR ANXIETY (HCC): ICD-10-CM

## 2023-11-20 DIAGNOSIS — F02.A0 MILD LATE ONSET ALZHEIMER'S DEMENTIA, UNSPECIFIED WHETHER BEHAVIORAL, PSYCHOTIC, OR MOOD DISTURBANCE OR ANXIETY (HCC): ICD-10-CM

## 2023-11-20 PROCEDURE — 70551 MRI BRAIN STEM W/O DYE: CPT

## 2023-11-21 ENCOUNTER — PATIENT OUTREACH (OUTPATIENT)
Dept: INTERNAL MEDICINE | Facility: OTHER | Age: 68
End: 2023-11-21

## 2023-11-21 DIAGNOSIS — F02.C0 SEVERE LATE ONSET ALZHEIMER'S DEMENTIA, UNSPECIFIED WHETHER BEHAVIORAL, PSYCHOTIC, OR MOOD DISTURBANCE OR ANXIETY (HCC): ICD-10-CM

## 2023-11-21 DIAGNOSIS — G30.1 SEVERE LATE ONSET ALZHEIMER'S DEMENTIA, UNSPECIFIED WHETHER BEHAVIORAL, PSYCHOTIC, OR MOOD DISTURBANCE OR ANXIETY (HCC): ICD-10-CM

## 2023-11-21 DIAGNOSIS — F32.A DEPRESSION, UNSPECIFIED DEPRESSION TYPE: ICD-10-CM

## 2023-11-22 PROBLEM — F02.C0 SEVERE LATE ONSET ALZHEIMER'S DEMENTIA (HCC): Status: ACTIVE | Noted: 2022-10-11

## 2024-01-31 ENCOUNTER — PATIENT OUTREACH (OUTPATIENT)
Dept: INTERNAL MEDICINE | Facility: OTHER | Age: 69
End: 2024-01-31
Payer: MEDICARE

## 2024-01-31 DIAGNOSIS — F32.A DEPRESSION, UNSPECIFIED DEPRESSION TYPE: ICD-10-CM

## 2024-01-31 DIAGNOSIS — F02.C0 SEVERE LATE ONSET ALZHEIMER'S DEMENTIA, UNSPECIFIED WHETHER BEHAVIORAL, PSYCHOTIC, OR MOOD DISTURBANCE OR ANXIETY (HCC): ICD-10-CM

## 2024-01-31 DIAGNOSIS — G30.1 SEVERE LATE ONSET ALZHEIMER'S DEMENTIA, UNSPECIFIED WHETHER BEHAVIORAL, PSYCHOTIC, OR MOOD DISTURBANCE OR ANXIETY (HCC): ICD-10-CM

## 2024-01-31 NOTE — PROGRESS NOTES
"Last [facility] Contact date:  November 2023    SUBJECTIVE  SW has been unable to reach patient or her  after multiple attempts in December and January. SW has received an update from patient's son. MRI confirmed diagnosis of Alzheimer's dementia. Patient is reviewing MyChart notes and cancelling appointments she does not want to attend without family knowledge. Safety concerns with lack of supervision at home and increasing confusion, pizza in the oven. Discussed SW concerns with interdisciplinary team.     COMMENTS  Patient is currently residing in [place] in a travel trailer with her . They are \"snow birds\" and will be back to the [facility] in April/May.     Have you visited the emergency room or been hospitalized within the last 30 days?   No    Have you received a new diagnosis in the last 30 days?  No    INDIVIDUALIZED CASE MANAGEMENT PLAN AND GOALS  Primary Goal  Reduce safety risk and improve education of Cognitive Impairment       BARRIERS TO CARE  Any obstacles to the member receiving or participating in the case management plan?  adherence to treatment plan, cognitive limitations, family and/or patients wishes incongruent with interdisciplinary teams recommendations    Future Appointments         Provider Department Center    4/9/2024 9:15 AM LAB [facility] Lab - [place]     4/9/2024 2:45 PM (Arrive by 2:30 PM) SOC[facility]RO VALDOVINOS BD 1 Imaging [facility]    4/9/2024 3:20 PM (Arrive by 3:05 PM) SOC[facility]RO VALDOVINOS MG 1 [facility] Mammography [facility]    4/23/2024 11:10 AM SANJEEV [facility] Endocrinology [facility]            Patient informed to contact Rosi Vieyra at the [facility] at [phone] for non-urgent questions/concerns.    DISCHARGE FROM [facility]  Yes, due to lack of contact.   "

## 2024-02-21 ENCOUNTER — PATIENT OUTREACH (OUTPATIENT)
Dept: INTERNAL MEDICINE | Facility: OTHER | Age: 69
End: 2024-02-21
Payer: MEDICARE

## 2024-02-21 DIAGNOSIS — F02.C0 SEVERE LATE ONSET ALZHEIMER'S DEMENTIA, UNSPECIFIED WHETHER BEHAVIORAL, PSYCHOTIC, OR MOOD DISTURBANCE OR ANXIETY (HCC): ICD-10-CM

## 2024-02-21 DIAGNOSIS — F32.A DEPRESSION, UNSPECIFIED DEPRESSION TYPE: ICD-10-CM

## 2024-02-21 DIAGNOSIS — G30.1 SEVERE LATE ONSET ALZHEIMER'S DEMENTIA, UNSPECIFIED WHETHER BEHAVIORAL, PSYCHOTIC, OR MOOD DISTURBANCE OR ANXIETY (HCC): ICD-10-CM

## 2024-02-21 NOTE — PROGRESS NOTES
Last [facility] Contact date:  01/31/2024    SUBJECTIVE  2/21/24 CCM discharge letter mailed to patient this day.     COMMENTS  No contact with patient's family since December 2023.     BARRIERS TO CARE  Any obstacles to the member receiving or participating in the case management plan?  cognitive limitations, family and/or patients wishes incongruent with interdisciplinary teams recommendations    Future Appointments         Provider Department Center    4/9/2024 9:15 AM LAB [facility] - [facility] [place]     4/9/2024 2:45 PM (Arrive by 2:30 PM) SOCORRO VALDOVINOS BD 1 Imaging [facility]    4/9/2024 3:20 PM (Arrive by 3:05 PM) SOC[facility]RO VALDOVINOS MG 1 [facility] Mammography [facility]    4/23/2024 11:10 AM SANJEEV [facility]            Patient informed to contact Rosi Vieyra at the [facility] at [phone] for non-urgent questions/concerns.    DISCHARGE FROM [facility]  Yes, Non adherence to the plan of care

## 2024-03-22 ENCOUNTER — TELEPHONE (OUTPATIENT)
Dept: HEALTH INFORMATION MANAGEMENT | Facility: OTHER | Age: 69
End: 2024-03-22
Payer: MEDICARE

## 2024-03-27 ENCOUNTER — PATIENT OUTREACH (OUTPATIENT)
Dept: INTERNAL MEDICINE | Facility: OTHER | Age: 69
End: 2024-03-27
Payer: MEDICARE

## 2024-03-27 DIAGNOSIS — G30.1 SEVERE LATE ONSET ALZHEIMER'S DEMENTIA, UNSPECIFIED WHETHER BEHAVIORAL, PSYCHOTIC, OR MOOD DISTURBANCE OR ANXIETY (HCC): ICD-10-CM

## 2024-03-27 DIAGNOSIS — F02.C0 SEVERE LATE ONSET ALZHEIMER'S DEMENTIA, UNSPECIFIED WHETHER BEHAVIORAL, PSYCHOTIC, OR MOOD DISTURBANCE OR ANXIETY (HCC): ICD-10-CM

## 2024-03-27 DIAGNOSIS — F32.A DEPRESSION, UNSPECIFIED DEPRESSION TYPE: ICD-10-CM

## 2024-03-27 NOTE — PROGRESS NOTES
Last CCM Contact date:  02/21/2024     SUBJECTIVE  SW received emails from patient's  John.      COMMENTS  No contact with patient's family since December 2023. John reporting they are in [place] for the winter. Returning in April. Agrees to case closure as they are unable to maintain monthly contact requirement for [facility] programming. SW encouraging contact with local chapter of Alzheimer[employer] in both [place] and [place] for additional support.      BARRIERS TO CARE  Any obstacles to the member receiving or participating in the case management plan?  Cognitive limitations, engagement.     Future Appointments           Provider Department Center     4/9/2024 9:15 AM LAB [facility] Lab - [facility]       4/9/2024 2:45 PM (Arrive by 2:30 PM) SOCORRO VALDOVINOS BD 1 Imaging [facility]     4/9/2024 3:20 PM (Arrive by 3:05 PM) SOCORRO VALDOVINOS MG 1 [facility] Mammography [facility]     4/23/2024 11:10 AM [facility]EE[facility]       Patient informed to contact Rosi Vieyra at the [facility] at [phone] for non-urgent questions/concerns.     DISCHARGE FROM [facility]  Yes, Non adherence to the plan of care. Case closed this day 3/27/2024.

## 2024-04-18 ENCOUNTER — HOSPITAL ENCOUNTER (OUTPATIENT)
Dept: LAB | Facility: MEDICAL CENTER | Age: 69
End: 2024-04-18
Payer: MEDICARE

## 2024-04-18 DIAGNOSIS — E78.5 DYSLIPIDEMIA: ICD-10-CM

## 2024-04-18 DIAGNOSIS — M81.0 POSTMENOPAUSAL OSTEOPOROSIS: ICD-10-CM

## 2024-04-18 DIAGNOSIS — E03.4 HYPOTHYROIDISM DUE TO ACQUIRED ATROPHY OF THYROID: ICD-10-CM

## 2024-04-18 LAB
ALBUMIN SERPL BCP-MCNC: 4.4 G/DL (ref 3.2–4.9)
ALBUMIN/GLOB SERPL: 1.4 G/DL
ALP SERPL-CCNC: 87 U/L (ref 30–99)
ALT SERPL-CCNC: 9 U/L (ref 2–50)
ANION GAP SERPL CALC-SCNC: 12 MMOL/L (ref 7–16)
AST SERPL-CCNC: 20 U/L (ref 12–45)
BILIRUB SERPL-MCNC: 0.4 MG/DL (ref 0.1–1.5)
BUN SERPL-MCNC: 17 MG/DL (ref 8–22)
CALCIUM ALBUM COR SERPL-MCNC: 9.4 MG/DL (ref 8.5–10.5)
CALCIUM SERPL-MCNC: 9.7 MG/DL (ref 8.5–10.5)
CHLORIDE SERPL-SCNC: 100 MMOL/L (ref 96–112)
CHOLEST SERPL-MCNC: 204 MG/DL (ref 100–199)
CO2 SERPL-SCNC: 26 MMOL/L (ref 20–33)
CREAT SERPL-MCNC: 0.94 MG/DL (ref 0.5–1.4)
FASTING STATUS PATIENT QL REPORTED: NORMAL
GFR SERPLBLD CREATININE-BSD FMLA CKD-EPI: 66 ML/MIN/1.73 M 2
GLOBULIN SER CALC-MCNC: 3.2 G/DL (ref 1.9–3.5)
GLUCOSE SERPL-MCNC: 114 MG/DL (ref 65–99)
HDLC SERPL-MCNC: 54 MG/DL
LDLC SERPL CALC-MCNC: 123 MG/DL
POTASSIUM SERPL-SCNC: 4 MMOL/L (ref 3.6–5.5)
PROT SERPL-MCNC: 7.6 G/DL (ref 6–8.2)
PTH-INTACT SERPL-MCNC: 45.4 PG/ML (ref 14–72)
SODIUM SERPL-SCNC: 138 MMOL/L (ref 135–145)
T4 FREE SERPL-MCNC: 1.06 NG/DL (ref 0.93–1.7)
TRIGL SERPL-MCNC: 135 MG/DL (ref 0–149)
TSH SERPL DL<=0.005 MIU/L-ACNC: 7.27 UIU/ML (ref 0.38–5.33)

## 2024-04-18 PROCEDURE — 84165 PROTEIN E-PHORESIS SERUM: CPT

## 2024-04-18 PROCEDURE — 80053 COMPREHEN METABOLIC PANEL: CPT

## 2024-04-18 PROCEDURE — 82523 COLLAGEN CROSSLINKS: CPT

## 2024-04-18 PROCEDURE — 83970 ASSAY OF PARATHORMONE: CPT

## 2024-04-18 PROCEDURE — 80061 LIPID PANEL: CPT

## 2024-04-18 PROCEDURE — 36415 COLL VENOUS BLD VENIPUNCTURE: CPT

## 2024-04-18 PROCEDURE — 84155 ASSAY OF PROTEIN SERUM: CPT | Mod: XU

## 2024-04-18 PROCEDURE — 84439 ASSAY OF FREE THYROXINE: CPT

## 2024-04-18 PROCEDURE — 84443 ASSAY THYROID STIM HORMONE: CPT

## 2024-04-20 LAB — COLLAGEN CTX SERPL-MCNC: 131 PG/ML

## 2024-04-22 LAB
ALBUMIN SERPL ELPH-MCNC: 4.23 G/DL (ref 3.75–5.01)
ALPHA1 GLOB SERPL ELPH-MCNC: 0.31 G/DL (ref 0.19–0.46)
ALPHA2 GLOB SERPL ELPH-MCNC: 0.69 G/DL (ref 0.48–1.05)
B-GLOBULIN SERPL ELPH-MCNC: 0.82 G/DL (ref 0.48–1.1)
GAMMA GLOB SERPL ELPH-MCNC: 1.35 G/DL (ref 0.62–1.51)
INTERPRETATION SERPL IFE-IMP: NORMAL
MONOCLON BAND OBS SERPL: NORMAL
MONOCLONAL PROTEIN NL11656: NORMAL G/DL
PATHOLOGY STUDY: NORMAL
PROT SERPL-MCNC: 7.4 G/DL (ref 6.3–8.2)

## 2024-04-30 ENCOUNTER — APPOINTMENT (OUTPATIENT)
Dept: ENDOCRINOLOGY | Facility: MEDICAL CENTER | Age: 69
End: 2024-04-30
Payer: MEDICARE

## 2024-05-15 ENCOUNTER — TELEMEDICINE (OUTPATIENT)
Dept: ENDOCRINOLOGY | Facility: MEDICAL CENTER | Age: 69
End: 2024-05-15
Payer: MEDICARE

## 2024-05-15 VITALS — HEIGHT: 62 IN | TEMPERATURE: 97.8 F | WEIGHT: 115 LBS | BODY MASS INDEX: 21.16 KG/M2

## 2024-05-15 DIAGNOSIS — R73.03 PREDIABETES: ICD-10-CM

## 2024-05-15 DIAGNOSIS — M81.0 POSTMENOPAUSAL OSTEOPOROSIS: ICD-10-CM

## 2024-05-15 DIAGNOSIS — E78.5 DYSLIPIDEMIA: ICD-10-CM

## 2024-05-15 DIAGNOSIS — E55.9 VITAMIN D DEFICIENCY: ICD-10-CM

## 2024-05-15 DIAGNOSIS — E03.4 HYPOTHYROIDISM DUE TO ACQUIRED ATROPHY OF THYROID: ICD-10-CM

## 2024-05-15 PROCEDURE — 99214 OFFICE O/P EST MOD 30 MIN: CPT | Mod: 95

## 2024-05-15 RX ORDER — LEVOTHYROXINE SODIUM 88 UG/1
88 TABLET ORAL
Qty: 90 TABLET | Refills: 3 | Status: SHIPPED | OUTPATIENT
Start: 2024-05-15

## 2024-05-15 ASSESSMENT — FIBROSIS 4 INDEX: FIB4 SCORE: 1.72

## 2024-05-15 NOTE — PROGRESS NOTES
Chief Complaint: Follow up for the following conditions   This evaluation was conducted via Zoom using secure and encrypted videoconferencing technology. The patient was in their home in the [facility] [place].    The patient's identity was confirmed and verbal consent was obtained for this virtual visit.   HPI:   Lidia Fitzpatrick is a 68 y.o. female    Patient presents with history of dementia. Spouse with patient anwering questions.     1. Hypothyroidism, acquired:   She reports a family history of Hypothyroidism with her aunt.  Diagnosed 10 years ago.        She is currently on Levothyroxine 88 mcg daily  She reports Good compliance and takes her thyroid hormone daily before breakfast.    She denies taking any iron, or antacids.   Reports taking calcium in the evening with dinner  Denies taking biotin  Reports taking multivitamins     She was referred to primary care and her last appointment establish care, she was seen back in May-however her thyroid hormone levels at uncontrolled   Ultrasound was ordered on last appointment but patient did not schedule ultrasound    She denies fatigue, weight changes, heat/cold intolerance, bowel/skin changes or CVS symptoms    She denies lumps or enlargement in the neck.  She has not been taking her thyroid labs.    Latest Reference Range & Units 04/18/24 13:13   TSH 0.380 - 5.330 uIU/mL 7.270 (H)   Free T-4 0.93 - 1.70 ng/dL 1.06       2. Postmenopausal Osteoporosis:  PCP sent alendronate 70 mg weekly   She denies falls and fractures.     She exercises every day by hiking when the weather permits  She takes calcium and vitamin D daily, she does not recall the dosages at this time  Scheduled for DEXA in one week.   Dexa scan on 7/20/2021 showed: Lumbar T score of -2.8, left femur T score -2.8  Dexa scan on 7/5/2019 showed: Lumbar T score -2.3, left femur T score -2.4  Dexa scan on 5/12/2017 showed: Lumbar T score -2.2, left femur T score -2.1     Latest Reference Range & Units  11/02/23 12:18   Bun 8 - 22 mg/dL 17   Creatinine 0.50 - 1.40 mg/dL 0.88   GFR (CKD-EPI) >60 mL/min/1.73 m 2 72      Latest Reference Range & Units 04/18/24 13:13   Pth, Intact 14.0 - 72.0 pg/mL 45.4      Latest Reference Range & Units 04/18/24 13:13   C. Telopeptide B Cross Linked pg/mL 131      Latest Reference Range & Units 04/18/24 13:13   MATEO Reflex  Not Done   Interpretation  See Note   Albumin 3.75 - 5.01 g/dL 4.23   Gamma Globulin 0.62 - 1.51 g/dL 1.35   Alpha-1 Globulin 0.19 - 0.46 g/dL 0.31   Alpha-2 Globulin 0.48 - 1.05 g/dL 0.69   Beta Globulin 0.48 - 1.10 g/dL 0.82   EER Serum Prot. Electro. Reflex  See Note   Monoclonal Protein <=0.00 g/dL Not Applicable     3.  Vitamin D deficiency:  Currently taking everyday   Latest Reference Range & Units 11/02/23 12:17   25-Hydroxy   Vitamin D 25 30 - 100 ng/mL 83       4. Prediabetes:  Diet: Healthy in general, except pt consumes a lot of sugars    Exercise: She exercises every day by hiking when the weather permits   Latest Reference Range & Units 11/02/23 12:18   Glycohemoglobin 4.0 - 5.6 % 5.8 (H)   Estim. Avg Glu mg/dL 120   Fasting Status  Fasting     5. Dyslipidemia:   Diet: Healthy in general   Exercise: She exercises every day by hiking when the weather permits  She had breakfast prior to taking her blood work    Latest Reference Range & Units 04/18/24 13:13   Cholesterol,Tot 100 - 199 mg/dL 204 (H)   Triglycerides 0 - 149 mg/dL 135   HDL >=40 mg/dL 54   LDL <100 mg/dL 123 (H)           Patient's medications, allergies, and social histories were reviewed and updated as appropriate.      ROS:     CONS:     No fever, no chills, no weight loss, no fatigue   EYES:      No diplopia, no blurry vision, no redness of eyes, no swelling of eyelids   ENT:    No hearing loss, No ear pain, No sore throat, no dysphagia, no neck swelling   CV:     No chest pain, no palpitations, no claudication, no orthopnea, no PND   PULM:    No SOB, no cough, no hemoptysis, no  wheezing    GI:   No nausea, no vomiting, no diarrhea, no constipation, no bloody stools   :  Passing urine well, no dysuria, no hematuria   ENDO:   No polyuria, no polydipsia, no heat intolerance, no cold intolerance   NEURO: No headaches, no dizziness, no convulsions, no tremors   MUSC:  No joint swellings, no arthralgias, no myalgias, no weakness   SKIN:   No rash, no ulcers, no dry skin   PSYCH:   No depression, no anxiety, no difficulty sleeping       Past Medical History:  Patient Active Problem List    Diagnosis Date Noted    Severe late onset Alzheimer's dementia (HCC) 10/11/2022    Vegetarian diet 05/23/2022    Blood donor 12/27/2021    Continuous leakage of urine_declined treatment 05/18/2021    Impaired fasting blood sugar 05/01/2020    S/P MITCH-BSO 03/17/2017    Age-related osteoporosis without current pathological fracture_[facility]     Hypothyroidism_[facility] 07/06/2010       Past Surgical History:  Past Surgical History:   Procedure Laterality Date    CARPAL TUNNEL ENDOSCOPIC Right 11/29/2017    Procedure: CARPAL TUNNEL ENDOSCOPIC;  Surgeon: Raheem Ennis M.D.;  Location: [facility];  Service: Orthopedics    HYSTERECTOMY ROBOTIC  11/21/2016    Procedure: HYSTERECTOMY ROBOTIC SUPRA CERVICAL WITH BILATERAL SALPINGO-OOPHORECTOMY;  Surgeon: Columba Estrada M.D.;  Location: [facility];  Service:     COLPOSACROPEXY ROBOTIC  11/21/2016    Procedure: COLPOSACROPEXY ROBOTIC;  Surgeon: Columba Estrada M.D.;  Location: [facility];  Service:     CYSTOSCOPY  11/21/2016    Procedure: CYSTOSCOPY;  Surgeon: Columba Estrada M.D.;  Location: [facility];  Service:     VAGINAL PERINEAL EXAM REPAIR  11/21/2016    Procedure: VAGINAL PERINEAL EXAM REPAIR FOR PERINEORRHAPHY, LEVATORPLASTY;  Surgeon: Columba Estrada M.D.;  Location: [facility];  Service:     ABDOMINAL HYSTERECTOMY TOTAL      BLADDER SUSPENSION      TUBAL COAGULATION  LAPAROSCOPIC BILATERAL          Allergies:  Morphine     Current Medications:    Current Outpatient Medications:     levothyroxine (SYNTHROID) 88 MCG Tab, Take 1 Tablet by mouth every morning on an empty stomach., Disp: 90 Tablet, Rfl: 3    alendronate (FOSAMAX) 70 MG Tab, Take 1 Tablet by mouth every 7 days., Disp: 12 Tablet, Rfl: 3    cyanocobalamin (VITAMIN B-12) 100 MCG Tab, Take 100 mcg by mouth every day., Disp: , Rfl:     Nutritional Supplements (NUTRITIONAL SUPPLEMENT PO), Take  by mouth. Better bladder, Disp: , Rfl:     NON SPECIFIED, every day. Neuro Q brain health & Youthful brain, Disp: , Rfl:     Cholecalciferol (VITAMIN D3 PO), Take 50 mcg by mouth 3 times a week., Disp: , Rfl:     multivitamin (THERAGRAN) Tab, Take 1 Tab by mouth every day., Disp: , Rfl:     Calcium Citrate-Vitamin D (CALCIUM + D PO), Take 1 Tablet by mouth every day. 600mg/12.5 mcg, Disp: , Rfl:     Social History:  Social History     Socioeconomic History    Marital status:      Spouse name: Not on file    Number of children: Not on file    Years of education: Not on file    Highest education level: Not on file   Occupational History    Not on file   Tobacco Use    Smoking status: Never    Smokeless tobacco: Never   Vaping Use    Vaping status: Never Used   Substance and Sexual Activity    Alcohol use: No    Drug use: No    Sexual activity: Not Currently     Partners: Male     Birth control/protection: Post-Menopausal   Other Topics Concern    Not on file   Social History Narrative    Not on file     Social Determinants of Health     Financial Resource Strain: Low Risk  (5/22/2023)    Overall Financial Resource Strain (CARDIA)     Difficulty of Paying Living Expenses: Not very hard   Food Insecurity: No Food Insecurity (5/22/2023)    Hunger Vital Sign     Worried About Running Out of Food in the Last Year: Never true     Ran Out of Food in the Last Year: Never true   Transportation Needs: No Transportation Needs (5/22/2023)  "   PRAPARE - Transportation     Lack of Transportation (Medical): No     Lack of Transportation (Non-Medical): No   Physical Activity: Sufficiently Active (5/22/2023)    Exercise Vital Sign     Days of Exercise per Week: 7 days     Minutes of Exercise per Session: 150+ min   Stress: No Stress Concern Present (5/22/2023)    [employer] - Occupational Stress Questionnaire     Feeling of Stress : Only a little   Recent Concern: Stress - Stress Concern Present (5/17/2023)    [employer] - Occupational Stress Questionnaire     Feeling of Stress : Rather much   Social Connections: Moderately Isolated (5/22/2023)    Social Connection and Isolation Panel [NHANES]     Frequency of Communication with Friends and Family: More than three times a week     Frequency of Social Gatherings with Friends and Family: Twice a week     Attends Caodaism Services: Never     Active Member of Clubs or Organizations: No     Attends Club or Organization Meetings: Never     Marital Status:    Intimate Partner Violence: Not At Risk (5/22/2023)    Humiliation, Afraid, Rape, and Kick questionnaire     Fear of Current or Ex-Partner: No     Emotionally Abused: No     Physically Abused: No     Sexually Abused: No   Housing Stability: Low Risk  (5/22/2023)    Housing Stability Vital Sign     Unable to Pay for Housing in the Last Year: No     Number of Places Lived in the Last Year: 1     Unstable Housing in the Last Year: No        Family History:   Family History   Problem Relation Age of Onset    Hyperlipidemia Mother     Hyperlipidemia Father     Heart Disease Father         PCI    Stroke Maternal Grandmother 87    Cancer Paternal Grandfather         Stomach    Cancer Maternal Aunt 62        Breast    Cancer Paternal Uncle         digestive, unknown type    Psychiatric Illness Brother          PHYSICAL EXAM:   Vital signs: Temp 36.6 °C (97.8 °F)   Ht 1.575 m (5' 2\")   Wt 52.2 kg " (115 lb)   BMI 21.03 kg/m²   GENERAL: Well-developed, well-nourished  in no apparent distress.      SKIN: No rashes, lesions. Turgor is normal.    Labs:  Lab Results   Component Value Date/Time    WBC 5.4 11/02/2023 12:18 PM    RBC 4.70 11/02/2023 12:18 PM    HEMOGLOBIN 13.8 11/02/2023 12:18 PM    MCV 91.9 11/02/2023 12:18 PM    MCH 29.4 11/02/2023 12:18 PM    MCHC 31.9 (L) 11/02/2023 12:18 PM    RDW 44.8 11/02/2023 12:18 PM    MPV 9.9 11/02/2023 12:18 PM       Lab Results   Component Value Date/Time    SODIUM 138 04/18/2024 01:13 PM    POTASSIUM 4.0 04/18/2024 01:13 PM    CHLORIDE 100 04/18/2024 01:13 PM    CO2 26 04/18/2024 01:13 PM    ANION 12.0 04/18/2024 01:13 PM    GLUCOSE 114 (H) 04/18/2024 01:13 PM    BUN 17 04/18/2024 01:13 PM    CREATININE 0.94 04/18/2024 01:13 PM    CALCIUM 9.7 04/18/2024 01:13 PM    ASTSGOT 20 04/18/2024 01:13 PM    ALTSGPT 9 04/18/2024 01:13 PM    TBILIRUBIN 0.4 04/18/2024 01:13 PM    ALBUMIN 4.23 04/18/2024 01:13 PM    ALBUMIN 4.4 04/18/2024 01:13 PM    TOTPROTEIN 7.4 04/18/2024 01:13 PM    TOTPROTEIN 7.6 04/18/2024 01:13 PM    GLOBULIN 3.2 04/18/2024 01:13 PM    AGRATIO 1.4 04/18/2024 01:13 PM       Lab Results   Component Value Date/Time    CHOLSTRLTOT 154 08/02/2021 1029    TRIGLYCERIDE 54 08/02/2021 1029    HDL 65 08/02/2021 1029    LDL 78 08/02/2021 1029       Lab Results   Component Value Date/Time    TSHULTRASEN 2.400 05/12/2021 1509     Lab Results   Component Value Date/Time    FREET4 1.46 05/12/2021 1509     Lab Results   Component Value Date/Time    FREET3 3.31 07/09/2016 0958     No results found for: THYSTIMIG    Lab Results   Component Value Date/Time    MICROSOMALA 1.1 07/09/2016 0958         Imaging:      ASSESSMENT/PLAN:   1. Hypothyroidism due to acquired atrophy of thyroid  Unstable  Medication:  Levothyroxine 88 mcg daily - continue  Patient has not been taking her medication, therefore reinforced the importance of taking it daily with spouse.   We will repeat  blood work in 4 months.   - levothyroxine (SYNTHROID) 88 MCG Tab; Take 1 Tablet by mouth every morning on an empty stomach.  Dispense: 90 Tablet; Refill: 3  - TSH; Future  - FREE THYROXINE; Future    2. Postmenopausal osteoporosis  Stable  Medication:  Fosamax 70 mg weekly - continue  Vitamin D supplementation discussed  Weightbearing exercises discussed  Calcium supplementation discussed  Bone density scheduled for next week.   Discussed to take alendronate medication with a full glass of water, stay upright for 30 minutes after  Discussed that if she ever needs to have procedure in which the mandible needs to be manipulated by a dentist, she needs to let dentist dentist know that she is on Fosamax   - Comp Metabolic Panel; Future    3. Vitamin D deficiency  - Stable  -Continue regimen-see HPI  - VITAMIN D,25 HYDROXY (DEFICIENCY); Future    4. Prediabetes  Unstable  - Exercise for least 150 minutes/week  - Stable hydration  - Maintain a healthy diet, minimal carbohydrate, portion control   - HEMOGLOBIN A1C; Future    5. Dyslipidemia  Unstable  Lipid panel not reliable as patient was not fasting at the time of blood draw.   We will repeat in 6 months.   - Exercise for least 150 minutes/week  - Stable hydration  - Maintain a healthy diet, minimal carbohydrate, portion control        Disposition: Make an appointment to follow-up in March   Do your blood work before your next appointment    Thank you kindly for allowing me to participate in the thyroid care plan for this patient.    Tho Ramires A.P.R.N.  5/15/24      CC:   Deana Chao M.D.

## 2024-05-22 ENCOUNTER — APPOINTMENT (OUTPATIENT)
Dept: RADIOLOGY | Facility: MEDICAL CENTER | Age: 69
End: 2024-05-22
Payer: MEDICARE

## 2024-05-22 ENCOUNTER — HOSPITAL ENCOUNTER (OUTPATIENT)
Dept: RADIOLOGY | Facility: MEDICAL CENTER | Age: 69
End: 2024-05-22
Attending: FAMILY MEDICINE
Payer: MEDICARE

## 2024-05-22 DIAGNOSIS — Z12.31 ENCOUNTER FOR SCREENING MAMMOGRAM FOR BREAST CANCER: ICD-10-CM

## 2024-05-29 ENCOUNTER — HOSPITAL ENCOUNTER (OUTPATIENT)
Dept: RADIOLOGY | Facility: MEDICAL CENTER | Age: 69
End: 2024-05-29
Payer: MEDICARE

## 2024-05-29 DIAGNOSIS — M81.0 POSTMENOPAUSAL OSTEOPOROSIS: ICD-10-CM

## 2024-09-17 ENCOUNTER — TELEPHONE (OUTPATIENT)
Dept: ENDOCRINOLOGY | Facility: MEDICAL CENTER | Age: 69
End: 2024-09-17
Payer: MEDICARE

## 2024-09-17 NOTE — TELEPHONE ENCOUNTER
Called pt to remind them of their appointment and labs that will need to be completed prior. / caregiver states pt will not complete labs and to cancel appointment. States that they will reschedule if she does them.

## 2024-10-28 ENCOUNTER — OFFICE VISIT (OUTPATIENT)
Dept: MEDICAL GROUP | Facility: MEDICAL CENTER | Age: 69
End: 2024-10-28
Payer: MEDICARE

## 2024-10-28 ENCOUNTER — HOSPITAL ENCOUNTER (OUTPATIENT)
Dept: LAB | Facility: MEDICAL CENTER | Age: 69
End: 2024-10-28
Attending: FAMILY MEDICINE
Payer: MEDICARE

## 2024-10-28 VITALS
BODY MASS INDEX: 21.2 KG/M2 | HEIGHT: 62 IN | WEIGHT: 115.19 LBS | HEART RATE: 63 BPM | SYSTOLIC BLOOD PRESSURE: 126 MMHG | OXYGEN SATURATION: 99 % | DIASTOLIC BLOOD PRESSURE: 74 MMHG | TEMPERATURE: 97 F

## 2024-10-28 DIAGNOSIS — F02.C11 SEVERE LATE ONSET ALZHEIMER'S DEMENTIA WITH AGITATION (HCC): ICD-10-CM

## 2024-10-28 DIAGNOSIS — E55.9 VITAMIN D INSUFFICIENCY: ICD-10-CM

## 2024-10-28 DIAGNOSIS — E03.9 ACQUIRED HYPOTHYROIDISM: ICD-10-CM

## 2024-10-28 DIAGNOSIS — R73.01 IMPAIRED FASTING BLOOD SUGAR: ICD-10-CM

## 2024-10-28 DIAGNOSIS — E78.00 PURE HYPERCHOLESTEROLEMIA: ICD-10-CM

## 2024-10-28 DIAGNOSIS — G30.1 SEVERE LATE ONSET ALZHEIMER'S DEMENTIA WITH AGITATION (HCC): ICD-10-CM

## 2024-10-28 DIAGNOSIS — N39.45 CONTINUOUS LEAKAGE OF URINE: ICD-10-CM

## 2024-10-28 DIAGNOSIS — M81.0 AGE-RELATED OSTEOPOROSIS WITHOUT CURRENT PATHOLOGICAL FRACTURE: ICD-10-CM

## 2024-10-28 PROBLEM — Z78.9 VEGETARIAN DIET: Status: RESOLVED | Noted: 2022-05-23 | Resolved: 2024-10-28

## 2024-10-28 PROBLEM — Z52.008 BLOOD DONOR: Status: RESOLVED | Noted: 2021-12-27 | Resolved: 2024-10-28

## 2024-10-28 LAB
25(OH)D3 SERPL-MCNC: 86 NG/ML (ref 30–100)
ALBUMIN SERPL BCP-MCNC: 4.4 G/DL (ref 3.2–4.9)
ALBUMIN/GLOB SERPL: 1.3 G/DL
ALP SERPL-CCNC: 92 U/L (ref 30–99)
ALT SERPL-CCNC: 10 U/L (ref 2–50)
ANION GAP SERPL CALC-SCNC: 12 MMOL/L (ref 7–16)
AST SERPL-CCNC: 22 U/L (ref 12–45)
BASOPHILS # BLD AUTO: 0.9 % (ref 0–1.8)
BASOPHILS # BLD: 0.05 K/UL (ref 0–0.12)
BILIRUB SERPL-MCNC: 0.6 MG/DL (ref 0.1–1.5)
BUN SERPL-MCNC: 16 MG/DL (ref 8–22)
CALCIUM ALBUM COR SERPL-MCNC: 9.7 MG/DL (ref 8.5–10.5)
CALCIUM SERPL-MCNC: 10 MG/DL (ref 8.5–10.5)
CHLORIDE SERPL-SCNC: 103 MMOL/L (ref 96–112)
CHOLEST SERPL-MCNC: 229 MG/DL (ref 100–199)
CO2 SERPL-SCNC: 25 MMOL/L (ref 20–33)
CREAT SERPL-MCNC: 0.88 MG/DL (ref 0.5–1.4)
EOSINOPHIL # BLD AUTO: 0.07 K/UL (ref 0–0.51)
EOSINOPHIL NFR BLD: 1.2 % (ref 0–6.9)
ERYTHROCYTE [DISTWIDTH] IN BLOOD BY AUTOMATED COUNT: 43.8 FL (ref 35.9–50)
EST. AVERAGE GLUCOSE BLD GHB EST-MCNC: 128 MG/DL
GFR SERPLBLD CREATININE-BSD FMLA CKD-EPI: 71 ML/MIN/1.73 M 2
GLOBULIN SER CALC-MCNC: 3.3 G/DL (ref 1.9–3.5)
GLUCOSE SERPL-MCNC: 93 MG/DL (ref 65–99)
HBA1C MFR BLD: 6.1 % (ref 4–5.6)
HCT VFR BLD AUTO: 43.4 % (ref 37–47)
HDLC SERPL-MCNC: 64 MG/DL
HGB BLD-MCNC: 14.1 G/DL (ref 12–16)
IMM GRANULOCYTES # BLD AUTO: 0.01 K/UL (ref 0–0.11)
IMM GRANULOCYTES NFR BLD AUTO: 0.2 % (ref 0–0.9)
LDLC SERPL CALC-MCNC: 145 MG/DL
LYMPHOCYTES # BLD AUTO: 2.26 K/UL (ref 1–4.8)
LYMPHOCYTES NFR BLD: 40 % (ref 22–41)
MCH RBC QN AUTO: 30.3 PG (ref 27–33)
MCHC RBC AUTO-ENTMCNC: 32.5 G/DL (ref 32.2–35.5)
MCV RBC AUTO: 93.3 FL (ref 81.4–97.8)
MONOCYTES # BLD AUTO: 0.44 K/UL (ref 0–0.85)
MONOCYTES NFR BLD AUTO: 7.8 % (ref 0–13.4)
NEUTROPHILS # BLD AUTO: 2.82 K/UL (ref 1.82–7.42)
NEUTROPHILS NFR BLD: 49.9 % (ref 44–72)
NRBC # BLD AUTO: 0 K/UL
NRBC BLD-RTO: 0 /100 WBC (ref 0–0.2)
PLATELET # BLD AUTO: 257 K/UL (ref 164–446)
PMV BLD AUTO: 9.8 FL (ref 9–12.9)
POTASSIUM SERPL-SCNC: 4.2 MMOL/L (ref 3.6–5.5)
PROT SERPL-MCNC: 7.7 G/DL (ref 6–8.2)
RBC # BLD AUTO: 4.65 M/UL (ref 4.2–5.4)
SODIUM SERPL-SCNC: 140 MMOL/L (ref 135–145)
T4 FREE SERPL-MCNC: 1.21 NG/DL (ref 0.93–1.7)
TRIGL SERPL-MCNC: 98 MG/DL (ref 0–149)
TSH SERPL DL<=0.005 MIU/L-ACNC: 9.01 UIU/ML (ref 0.38–5.33)
WBC # BLD AUTO: 5.7 K/UL (ref 4.8–10.8)

## 2024-10-28 PROCEDURE — 82306 VITAMIN D 25 HYDROXY: CPT

## 2024-10-28 PROCEDURE — 3078F DIAST BP <80 MM HG: CPT | Performed by: FAMILY MEDICINE

## 2024-10-28 PROCEDURE — 84439 ASSAY OF FREE THYROXINE: CPT

## 2024-10-28 PROCEDURE — 83036 HEMOGLOBIN GLYCOSYLATED A1C: CPT

## 2024-10-28 PROCEDURE — 84443 ASSAY THYROID STIM HORMONE: CPT

## 2024-10-28 PROCEDURE — 80053 COMPREHEN METABOLIC PANEL: CPT

## 2024-10-28 PROCEDURE — 36415 COLL VENOUS BLD VENIPUNCTURE: CPT

## 2024-10-28 PROCEDURE — 85025 COMPLETE CBC W/AUTO DIFF WBC: CPT

## 2024-10-28 PROCEDURE — 99214 OFFICE O/P EST MOD 30 MIN: CPT | Performed by: FAMILY MEDICINE

## 2024-10-28 PROCEDURE — 80061 LIPID PANEL: CPT

## 2024-10-28 PROCEDURE — 3074F SYST BP LT 130 MM HG: CPT | Performed by: FAMILY MEDICINE

## 2024-10-28 RX ORDER — QUETIAPINE FUMARATE 25 MG/1
12.5-25 TABLET, FILM COATED ORAL NIGHTLY PRN
Qty: 90 TABLET | Refills: 0 | Status: SHIPPED | OUTPATIENT
Start: 2024-10-28

## 2024-10-28 RX ORDER — ALENDRONATE SODIUM 70 MG/1
70 TABLET ORAL
Qty: 12 TABLET | Refills: 3 | Status: SHIPPED | OUTPATIENT
Start: 2024-10-28

## 2024-10-28 ASSESSMENT — FIBROSIS 4 INDEX: FIB4 SCORE: 1.74

## 2024-10-30 DIAGNOSIS — E03.4 HYPOTHYROIDISM DUE TO ACQUIRED ATROPHY OF THYROID: ICD-10-CM

## 2024-10-30 PROBLEM — F02.C11 SEVERE LATE ONSET ALZHEIMER'S DEMENTIA WITH AGITATION (HCC): Status: ACTIVE | Noted: 2022-10-11

## 2024-10-30 RX ORDER — LEVOTHYROXINE SODIUM 100 UG/1
100 TABLET ORAL
Qty: 90 TABLET | Refills: 0 | Status: SHIPPED | OUTPATIENT
Start: 2024-10-30

## 2024-11-21 ENCOUNTER — PATIENT MESSAGE (OUTPATIENT)
Dept: MEDICAL GROUP | Facility: MEDICAL CENTER | Age: 69
End: 2024-11-21
Payer: MEDICARE

## 2025-02-03 DIAGNOSIS — E03.4 HYPOTHYROIDISM DUE TO ACQUIRED ATROPHY OF THYROID: ICD-10-CM

## 2025-02-05 DIAGNOSIS — E03.4 HYPOTHYROIDISM DUE TO ACQUIRED ATROPHY OF THYROID: ICD-10-CM

## 2025-02-05 RX ORDER — LEVOTHYROXINE SODIUM 100 UG/1
100 TABLET ORAL
Qty: 100 TABLET | Refills: 2 | Status: SHIPPED | OUTPATIENT
Start: 2025-02-05 | End: 2025-02-10

## 2025-02-05 NOTE — TELEPHONE ENCOUNTER
Received request via: Patient    Was the patient seen in the last year in this department? Yes    Does the patient have an active prescription (recently filled or refills available) for medication(s) requested? No    Does the patient have skilled nursing Plus and need 100-day supply? (This applies to ALL medications) Patient does not have SCP

## 2025-02-06 ENCOUNTER — OFFICE VISIT (OUTPATIENT)
Dept: MEDICAL GROUP | Facility: MEDICAL CENTER | Age: 70
End: 2025-02-06
Payer: MEDICARE

## 2025-02-06 ENCOUNTER — HOSPITAL ENCOUNTER (OUTPATIENT)
Dept: RADIOLOGY | Facility: MEDICAL CENTER | Age: 70
End: 2025-02-06
Attending: FAMILY MEDICINE
Payer: MEDICARE

## 2025-02-06 VITALS
SYSTOLIC BLOOD PRESSURE: 116 MMHG | BODY MASS INDEX: 21.16 KG/M2 | DIASTOLIC BLOOD PRESSURE: 70 MMHG | TEMPERATURE: 96.7 F | WEIGHT: 115 LBS | HEIGHT: 62 IN | HEART RATE: 83 BPM | OXYGEN SATURATION: 98 %

## 2025-02-06 DIAGNOSIS — M54.6 ACUTE MIDLINE THORACIC BACK PAIN: ICD-10-CM

## 2025-02-06 DIAGNOSIS — N30.00 ACUTE CYSTITIS WITHOUT HEMATURIA: ICD-10-CM

## 2025-02-06 DIAGNOSIS — Z00.00 PE (PHYSICAL EXAM), ANNUAL: Primary | ICD-10-CM

## 2025-02-06 DIAGNOSIS — N39.45 CONTINUOUS LEAKAGE OF URINE: ICD-10-CM

## 2025-02-06 DIAGNOSIS — M81.0 AGE-RELATED OSTEOPOROSIS WITHOUT CURRENT PATHOLOGICAL FRACTURE: ICD-10-CM

## 2025-02-06 DIAGNOSIS — E03.9 ACQUIRED HYPOTHYROIDISM: ICD-10-CM

## 2025-02-06 DIAGNOSIS — R10.84 GENERALIZED ABDOMINAL PAIN: ICD-10-CM

## 2025-02-06 DIAGNOSIS — G30.1 SEVERE LATE ONSET ALZHEIMER'S DEMENTIA WITH AGITATION (HCC): ICD-10-CM

## 2025-02-06 DIAGNOSIS — F02.C11 SEVERE LATE ONSET ALZHEIMER'S DEMENTIA WITH AGITATION (HCC): ICD-10-CM

## 2025-02-06 DIAGNOSIS — E78.00 PURE HYPERCHOLESTEROLEMIA: ICD-10-CM

## 2025-02-06 DIAGNOSIS — R73.03 PREDIABETES: ICD-10-CM

## 2025-02-06 DIAGNOSIS — S22.000A COMPRESSION FRACTURE OF BODY OF THORACIC VERTEBRA (HCC): ICD-10-CM

## 2025-02-06 PROBLEM — R73.01 IMPAIRED FASTING BLOOD SUGAR: Status: RESOLVED | Noted: 2020-05-01 | Resolved: 2025-02-06

## 2025-02-06 PROCEDURE — 99214 OFFICE O/P EST MOD 30 MIN: CPT | Performed by: FAMILY MEDICINE

## 2025-02-06 PROCEDURE — 3078F DIAST BP <80 MM HG: CPT | Performed by: FAMILY MEDICINE

## 2025-02-06 PROCEDURE — 72070 X-RAY EXAM THORAC SPINE 2VWS: CPT

## 2025-02-06 PROCEDURE — 3074F SYST BP LT 130 MM HG: CPT | Performed by: FAMILY MEDICINE

## 2025-02-06 RX ORDER — SULFAMETHOXAZOLE AND TRIMETHOPRIM 800; 160 MG/1; MG/1
1 TABLET ORAL 2 TIMES DAILY
Qty: 6 TABLET | Refills: 0 | Status: SHIPPED | OUTPATIENT
Start: 2025-02-06 | End: 2025-02-09

## 2025-02-06 ASSESSMENT — FIBROSIS 4 INDEX: FIB4 SCORE: 1.84

## 2025-02-06 NOTE — PROGRESS NOTES
Verbal consent was acquired by the patient to use [employer] ambient listening note generation during this visit: YES    CC: aPE, acute thoracic back pain, abdominal pain     Assessment & Plan:     1. Age-related osteoporosis without current pathological fracture_[facility]  Chronic, currently taking Fosamax 70 mg weekly along with calcium and vitamin D.  She complains of acute thoracic back pain.  X-ray was notable for compression fracture at T4 and T9.  -Discontinue Fosamax  -Start Prolia  - Vitamin D: 2000 units per day, maintain serum vitamin D level > 30   - Calcium 600 mg BID  - Weight-bearing, balance, resistance exercises   - maintain healthy active lifestyle  - avoid or stop smoking  - Limit alcohol consumption to one drink per day  - pt was counseled on fall prevention    - home fall-proofing information provided.      2. Acquired hypothyroidism  Chronic, controlled with Levothyroxine 100 mcg qd, no s/e reported, will continue.      3. Prediabetes  Last A1C was 6.1.   Declined treatment.   - Dietary/lifestyle modification =    4. Pure hypercholesterolemia  Chronic, persistent hyperlipidemia.   Due to severe dementia, declined treatment.   Will continue to monitor.     5. PE (physical exam), annual (Primary)  Labs per orders  Immunization reviewed and discussed.  Patient was counseled about  diet, supplements, exercises.   Preventive cares reviewed, discussed, and updated as appropriate.       6. Acute cystitis without hematuria  7. Continuous leakage of urine_declined treatment  Patient complains of acute mid thoracic back and and generalized abdominal and pelvis pain. She has severe Alzheimer's disease and is nonverbal. Therefore, I was not able to get accurate history. She has urinary incontinence and is not able to provide urine sample. She does have mild pelvic pain with palpation.  denies hematuria, fever, chills, behavioral changes. Will treat presumptively for UTI.   -  sulfamethoxazole-trimethoprim (BACTRIM DS) 800-160 MG tablet; Take 1 Tablet by mouth 2 times a day for 3 days.  Dispense: 6 Tablet; Refill: 0      8. Severe late onset Alzheimer's dementia with agitation (HCC)  Chronic, severe, progressing.   Takes Seroquel as needed for agitation.   Lives with  who is primary caregiver.   Does not take any medications. Does not follow up with neurology.    attends support group.     9. Generalized abdominal pain  10. Acute midline thoracic back pain  11. Compression fracture T4, T9  Patient is nonverbal due to dementia.   I was not able to get accurate history.   She points to her back and abdomen as the source of her pain.   Negative rash on exam. Epigastric, RUQ, and pelvis were tender to palpation.   Negative fever, chills, behavioral changes, hematuria, bowel problems according to her .   - Thoracic x-ray was notable for compression fracture at T9 and T4.  Recommended pain control with Tylenol 500 mg every 6 hours as needed.  Management of osteoporosis discussed as above.  - US-ABDOMEN COMPLETE SURVEY; Future  - DX-THORACIC SPINE-2 VIEWS; Future  - Will treat patient with presumptive plan for UTI with Bactrim as above         HCC Gap Form    Last edited 02/06/25 09:03 PST by Deana Chao M.D.         Subjective:       HPI:     History of Present Illness  The patient presents for multiple concerns  Patient has chronic severe Alzheimer dementia.  She is nonverbal.  She presents with her  to discuss acute onset pain.  Patient points to the mid thoracic spine as the location of pain.  She is not able to provide any other additional information.  Upon further questioning, she also points to the stomach and pelvis as the source of her pain.  Again, she is not able to provide any further information.    Her  denies fever, chills, nausea, vomiting, changes in behavior, changes in sleep and dietary pattern, changes in hydration, history of falls, rash  "hematochezia, melena, constipation, diarrhea.    Patient is taking all her medication as directed.  No side effect reported.    Her dementia is stable.  Her  provided 0.5 tablet of Seroquel as needed for acute agitation, but rarely required.    Patient has continuous urinary incontinence.  She is wearing diaper.  She is not able to provide urine sample during office visit.    IMMUNIZATIONS  - Declined influenza vaccine        Current Outpatient Medications:     sulfamethoxazole-trimethoprim (BACTRIM DS) 800-160 MG tablet, Take 1 Tablet by mouth 2 times a day for 3 days., Disp: 6 Tablet, Rfl: 0    levothyroxine (SYNTHROID) 100 MCG Tab, Take 1 Tablet by mouth every morning on an empty stomach., Disp: 100 Tablet, Rfl: 2    QUEtiapine (SEROQUEL) 25 MG Tab, Take 0.5-1 Tablets by mouth at bedtime as needed (agitaion, sun downing)., Disp: 90 Tablet, Rfl: 0    alendronate (FOSAMAX) 70 MG Tab, Take 1 Tablet by mouth every 7 days., Disp: 12 Tablet, Rfl: 3    cyanocobalamin (VITAMIN B-12) 100 MCG Tab, Take 100 mcg by mouth every day., Disp: , Rfl:     Nutritional Supplements (NUTRITIONAL SUPPLEMENT PO), Take  by mouth. Better bladder, Disp: , Rfl:     NON SPECIFIED, every day. Neuro Q brain health & Youthful brain, Disp: , Rfl:     Cholecalciferol (VITAMIN D3 PO), Take 50 mcg by mouth 3 times a week., Disp: , Rfl:     multivitamin (THERAGRAN) Tab, Take 1 Tab by mouth every day., Disp: , Rfl:     Calcium Citrate-Vitamin D (CALCIUM + D PO), Take 1 Tablet by mouth every day. 600mg/12.5 mcg, Disp: , Rfl:      Objective:     Exam:  /70 (BP Location: Left arm, Patient Position: Sitting, BP Cuff Size: Adult long)   Pulse 83   Temp 35.9 °C (96.7 °F) (Temporal)   Ht 1.575 m (5' 2\")   Wt 52.2 kg (115 lb)   SpO2 98%   BMI 21.03 kg/m²  Body mass index is 21.03 kg/m².    Constitutional: awake, alert, in no distress. Strong urine odor noted.   Skin: Warm, dry, good turgor, no rashes, bruises, ulcers in visible " areas.  Eye: conjunctiva clear, lids neg for edema or lesions.  Respiratory: Unlabored respiratory effort, lungs clear to auscultation, no wheezes, no rales.  Cardiovascular: Normal S1, S2, no murmur, no pedal edema.  Abdomen: Soft, mild to palpation at the epigastric, right upper quadrant, and pelvic area, active BS, no hernia, no hepatosplenomegaly, negative rebound or guarding.   Psych: Oriented x3, affect and mood wnl, intact judgement and insight.         Return in about 6 months (around 8/6/2025) for Multiple issues.          Please note that this dictation was created using voice recognition software. I have made every reasonable attempt to correct obvious errors, but I expect that there are errors of grammar and possibly content that I did not discover before finalizing the note.

## 2025-02-07 PROBLEM — M54.6 ACUTE MIDLINE THORACIC BACK PAIN: Status: ACTIVE | Noted: 2025-02-07

## 2025-02-07 PROBLEM — S22.000A COMPRESSION FRACTURE OF BODY OF THORACIC VERTEBRA (HCC): Status: ACTIVE | Noted: 2025-02-07

## 2025-02-07 RX ORDER — DIPHENHYDRAMINE HYDROCHLORIDE 50 MG/ML
50 INJECTION INTRAMUSCULAR; INTRAVENOUS PRN
OUTPATIENT
Start: 2025-02-07

## 2025-02-07 RX ORDER — METHYLPREDNISOLONE SODIUM SUCCINATE 125 MG/2ML
125 INJECTION, POWDER, LYOPHILIZED, FOR SOLUTION INTRAMUSCULAR; INTRAVENOUS PRN
OUTPATIENT
Start: 2025-02-07

## 2025-02-07 RX ORDER — EPINEPHRINE 1 MG/ML(1)
0.5 AMPUL (ML) INJECTION PRN
OUTPATIENT
Start: 2025-02-07

## 2025-02-10 RX ORDER — LEVOTHYROXINE SODIUM 100 UG/1
100 TABLET ORAL
Qty: 100 TABLET | Refills: 1 | Status: SHIPPED | OUTPATIENT
Start: 2025-02-10

## 2025-03-14 ENCOUNTER — HOSPITAL ENCOUNTER (OUTPATIENT)
Dept: RADIOLOGY | Facility: MEDICAL CENTER | Age: 70
End: 2025-03-14
Attending: FAMILY MEDICINE
Payer: MEDICARE

## 2025-03-14 ENCOUNTER — OUTPATIENT INFUSION SERVICES (OUTPATIENT)
Dept: ONCOLOGY | Facility: MEDICAL CENTER | Age: 70
End: 2025-03-14
Attending: FAMILY MEDICINE
Payer: MEDICARE

## 2025-03-14 VITALS
SYSTOLIC BLOOD PRESSURE: 145 MMHG | TEMPERATURE: 97 F | WEIGHT: 117.06 LBS | HEIGHT: 63 IN | RESPIRATION RATE: 18 BRPM | OXYGEN SATURATION: 96 % | BODY MASS INDEX: 20.74 KG/M2 | HEART RATE: 83 BPM | DIASTOLIC BLOOD PRESSURE: 85 MMHG

## 2025-03-14 DIAGNOSIS — R10.84 GENERALIZED ABDOMINAL PAIN: ICD-10-CM

## 2025-03-14 PROCEDURE — 76700 US EXAM ABDOM COMPLETE: CPT

## 2025-03-14 ASSESSMENT — PAIN DESCRIPTION - PAIN TYPE: TYPE: ACUTE PAIN

## 2025-03-14 ASSESSMENT — FIBROSIS 4 INDEX: FIB4 SCORE: 1.84

## 2025-03-14 NOTE — PROGRESS NOTES
Stephanie arrived to [facility] with her spouse for first prolia injection. Plan of care reviewed, assessment completed. Medications and allergies reviewed. Patient took alendronate on Monday, per pharmacist recommendation the patients appointment was rescheduled. Patient knows to stop alendronate. Patient and her spouse were discharged home in [facility], new appointment was confirmed prior to discharge.

## 2025-03-15 ENCOUNTER — RESULTS FOLLOW-UP (OUTPATIENT)
Dept: MEDICAL GROUP | Facility: MEDICAL CENTER | Age: 70
End: 2025-03-15
Payer: MEDICARE

## 2025-03-23 ENCOUNTER — OUTPATIENT INFUSION SERVICES (OUTPATIENT)
Dept: ONCOLOGY | Facility: MEDICAL CENTER | Age: 70
End: 2025-03-23
Attending: FAMILY MEDICINE
Payer: MEDICARE

## 2025-03-23 VITALS
OXYGEN SATURATION: 95 % | DIASTOLIC BLOOD PRESSURE: 81 MMHG | RESPIRATION RATE: 18 BRPM | WEIGHT: 117.95 LBS | TEMPERATURE: 97.6 F | SYSTOLIC BLOOD PRESSURE: 139 MMHG | HEIGHT: 63 IN | HEART RATE: 85 BPM | BODY MASS INDEX: 20.9 KG/M2

## 2025-03-23 DIAGNOSIS — M54.6 ACUTE MIDLINE THORACIC BACK PAIN: ICD-10-CM

## 2025-03-23 DIAGNOSIS — M81.0 AGE-RELATED OSTEOPOROSIS WITHOUT CURRENT PATHOLOGICAL FRACTURE: ICD-10-CM

## 2025-03-23 DIAGNOSIS — S22.000A COMPRESSION FRACTURE OF BODY OF THORACIC VERTEBRA (HCC): ICD-10-CM

## 2025-03-23 LAB
CA-I BLD ISE-SCNC: 1.18 MMOL/L (ref 1.1–1.3)
CREAT BLD-MCNC: 1.3 MG/DL (ref 0.5–1.4)

## 2025-03-23 PROCEDURE — 82330 ASSAY OF CALCIUM: CPT

## 2025-03-23 PROCEDURE — 36415 COLL VENOUS BLD VENIPUNCTURE: CPT

## 2025-03-23 PROCEDURE — 82565 ASSAY OF CREATININE: CPT

## 2025-03-23 PROCEDURE — 700111 HCHG RX REV CODE 636 W/ 250 OVERRIDE (IP): Mod: JZ,TB | Performed by: FAMILY MEDICINE

## 2025-03-23 PROCEDURE — 96372 THER/PROPH/DIAG INJ SC/IM: CPT

## 2025-03-23 RX ORDER — DIPHENHYDRAMINE HYDROCHLORIDE 50 MG/ML
50 INJECTION, SOLUTION INTRAMUSCULAR; INTRAVENOUS PRN
OUTPATIENT
Start: 2025-09-19

## 2025-03-23 RX ORDER — METHYLPREDNISOLONE SODIUM SUCCINATE 125 MG/2ML
125 INJECTION, POWDER, LYOPHILIZED, FOR SOLUTION INTRAMUSCULAR; INTRAVENOUS PRN
OUTPATIENT
Start: 2025-09-19

## 2025-03-23 RX ORDER — EPINEPHRINE 1 MG/ML(1)
0.5 AMPUL (ML) INJECTION PRN
OUTPATIENT
Start: 2025-09-19

## 2025-03-23 RX ADMIN — DENOSUMAB 60 MG: 60 INJECTION SUBCUTANEOUS at 16:39

## 2025-03-23 ASSESSMENT — FIBROSIS 4 INDEX: FIB4 SCORE: 1.84

## 2025-03-23 NOTE — PROGRESS NOTES
Pt arrives to [facility] for first dose of Prolia.  Pt was previously on Fosamax and has stopped this medication for over 1 week.  Discussed plan of care with pt and her spouse.  Pt does not have any s/sx of infection or recent/planned dental procedures.  ISTAT calcium and creatinine drawn via 23g butterfly needle to L-AC.  Labs reviewed and ok to give Prolia per pharmacy.  Prolia given [place] [facility]  Email sent to scheduling dept to set up next appt in 6 months.  Pt dc home with her spouse.

## 2025-04-28 ENCOUNTER — TELEMEDICINE (OUTPATIENT)
Dept: MEDICAL GROUP | Facility: MEDICAL CENTER | Age: 70
End: 2025-04-28
Payer: MEDICARE

## 2025-04-28 VITALS — WEIGHT: 115 LBS | BODY MASS INDEX: 21.16 KG/M2 | HEIGHT: 62 IN | RESPIRATION RATE: 18 BRPM

## 2025-04-28 DIAGNOSIS — E03.4 HYPOTHYROIDISM DUE TO ACQUIRED ATROPHY OF THYROID: ICD-10-CM

## 2025-04-28 DIAGNOSIS — R73.03 PREDIABETES: ICD-10-CM

## 2025-04-28 DIAGNOSIS — E78.00 PURE HYPERCHOLESTEROLEMIA: ICD-10-CM

## 2025-04-28 DIAGNOSIS — S22.000A COMPRESSION FRACTURE OF BODY OF THORACIC VERTEBRA (HCC): ICD-10-CM

## 2025-04-28 DIAGNOSIS — M81.0 AGE-RELATED OSTEOPOROSIS WITHOUT CURRENT PATHOLOGICAL FRACTURE: ICD-10-CM

## 2025-04-28 RX ORDER — LEVOTHYROXINE SODIUM 100 UG/1
100 TABLET ORAL
Qty: 100 TABLET | Refills: 2 | Status: SHIPPED | OUTPATIENT
Start: 2025-04-28

## 2025-04-28 ASSESSMENT — FIBROSIS 4 INDEX: FIB4 SCORE: 1.84

## 2025-04-28 NOTE — PROGRESS NOTES
Virtual Visit: Established Patient   This visit was conducted via Teams using secure and encrypted videoconferencing technology.   The patient was in their home in the [facility].    The patient's identity was confirmed and verbal consent was obtained for this virtual visit.    Subjective:   CC: hypothyroidism     Lidia Fitzpatrick is a 69 y.o. female with severe late onset dementia. She presents with her  who is primary care giver.  Patient is taking all medication as directed, no side effects reported.  She started Prolia injection in March 2025.  She is active and continue to walk regularly.  Denies any behavioral changes.    She was previously found to have compression fracture of the thoracic spine.  Her symptoms are much better.  She is taking ibuprofen as needed for pain.    She is taking levothyroxine 100 mcg daily.  She is due to have repeat thyroid function test for reassessment.  Last thyroid function test was abnormal.    Current medicines (including changes today)  Current Outpatient Medications   Medication Sig Dispense Refill    levothyroxine (SYNTHROID) 100 MCG Tab Take 1 Tablet by mouth every morning on an empty stomach. 100 Tablet 2    QUEtiapine (SEROQUEL) 25 MG Tab Take 0.5-1 Tablets by mouth at bedtime as needed (agitaion, sun downing). 90 Tablet 0    cyanocobalamin (VITAMIN B-12) 100 MCG Tab Take 100 mcg by mouth every day.      Nutritional Supplements (NUTRITIONAL SUPPLEMENT PO) Take  by mouth. Better bladder      NON SPECIFIED every day. Neuro Q brain health & Youthful brain      Cholecalciferol (VITAMIN D3 PO) Take 50 mcg by mouth 3 times a week.      multivitamin (THERAGRAN) Tab Take 1 Tab by mouth every day.      Calcium Citrate-Vitamin D (CALCIUM + D PO) Take 1 Tablet by mouth every day. 600mg/12.5 mcg       No current facility-administered medications for this visit.       Patient Active Problem List    Diagnosis Date Noted    Acute midline thoracic back pain 02/07/2025     "Compression fracture of body of thoracic vertebra (HCC) 02/07/2025    Pure hypercholesterolemia 10/28/2024    Severe late onset Alzheimer's dementia with agitation (HCC) 10/11/2022    Continuous leakage of urine_declined treatment 05/18/2021    Prediabetes 03/17/2017    S/P MITCH-BSO 03/17/2017    Age-related osteoporosis without current pathological fracture_[facility]     Hypothyroidism_[facility] 07/06/2010        Objective:   Resp 18 Comment: patient reported  Ht 1.575 m (5' 2\") Comment: patient reported  Wt 52.2 kg (115 lb) Comment: patient reported  BMI 21.03 kg/m²     Physical Exam:  Constitutional: Alert, no distress, well-groomed.  Skin: No rashes in visible areas.  Eye: Round. Conjunctiva clear, lids normal. No icterus.   ENMT: Lips pink without lesions, good dentition, moist mucous membranes. Phonation normal.  Neck: No masses, no thyromegaly. Moves freely without pain.  Respiratory: Unlabored respiratory effort, no cough or audible wheeze  Psych: Patient is nonverbal.    Assessment and Plan:   The following treatment plan was discussed:     1. Compression fracture of body of thoracic vertebra (HCC)  2. Age-related osteoporosis without current pathological fracture_[facility]  Chronic, currently taking Prolia, calcium, vitamin D.  She is tolerating the medication well, no side effect reported.  Negative history of fall or bone fracture since last office visit.  She has intermittent thoracic back pain.   has been providing ibuprofen as needed.  - Avoid regular NSAID  - Tylenol as needed for pain  - Vitamin D: 2000 units per day, maintain serum vitamin D level > 30   - Continue Prolia every 6 months  - Calcium 600 mg BID  - Weight-bearing, balance, resistance exercises   - maintain healthy active lifestyle  - avoid or stop smoking  - Limit alcohol consumption to one drink per day  - pt was counseled on fall prevention    - home fall-proofing information provided.      3. Hypothyroidism due to " acquired atrophy of thyroid  Chronic, currently taking levothyroxine 100 mcg daily.  Will repeat labs for reassessment.  Last thyroid function test was abnormal.  - levothyroxine (SYNTHROID) 100 MCG Tab; Take 1 Tablet by mouth every morning on an empty stomach.  Dispense: 100 Tablet; Refill: 2  - TSH WITH REFLEX TO FT4; Future    4. Prediabetes  Prior A1c was 6.1.  Patient has been working on dietary modification.  She has been walking regularly every day. Body mass index is 21.03 kg/m².  Her  has been limiting sweets and carbs.   - HEMOGLOBIN A1C; Future    5. Pure hypercholesterolemia  Chronic, persistent hyperlipidemia.  Patient currently does not take any medications for this condition.  She is working on diet and increase physical activity to improve this condition.  - Lipid Profile; Future  - Comp Metabolic Panel; Future     Follow-up: Return in about 6 months (around 10/28/2025) for Multiple issues.

## 2025-05-16 ENCOUNTER — APPOINTMENT (OUTPATIENT)
Dept: RADIOLOGY | Facility: IMAGING CENTER | Age: 70
End: 2025-05-16
Attending: PHYSICIAN ASSISTANT
Payer: MEDICARE

## 2025-05-16 ENCOUNTER — OFFICE VISIT (OUTPATIENT)
Dept: URGENT CARE | Facility: CLINIC | Age: 70
End: 2025-05-16
Payer: MEDICARE

## 2025-05-16 VITALS
RESPIRATION RATE: 18 BRPM | DIASTOLIC BLOOD PRESSURE: 62 MMHG | OXYGEN SATURATION: 95 % | HEART RATE: 72 BPM | WEIGHT: 116 LBS | SYSTOLIC BLOOD PRESSURE: 110 MMHG | BODY MASS INDEX: 21.35 KG/M2 | TEMPERATURE: 97.5 F | HEIGHT: 62 IN

## 2025-05-16 DIAGNOSIS — M54.50 ACUTE LOW BACK PAIN, UNSPECIFIED BACK PAIN LATERALITY, UNSPECIFIED WHETHER SCIATICA PRESENT: ICD-10-CM

## 2025-05-16 DIAGNOSIS — M25.559 HIP PAIN, UNSPECIFIED LATERALITY: ICD-10-CM
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
## 2025-05-16 PROCEDURE — 73522 X-RAY EXAM HIPS BI 3-4 VIEWS: CPT | Mod: TC,FY | Performed by: RADIOLOGY

## 2025-05-16 PROCEDURE — 3078F DIAST BP <80 MM HG: CPT | Performed by: PHYSICIAN ASSISTANT

## 2025-05-16 PROCEDURE — 72100 X-RAY EXAM L-S SPINE 2/3 VWS: CPT | Mod: TC,FY | Performed by: RADIOLOGY

## 2025-05-16 PROCEDURE — 99213 OFFICE O/P EST LOW 20 MIN: CPT | Performed by: PHYSICIAN ASSISTANT

## 2025-05-16 PROCEDURE — 3074F SYST BP LT 130 MM HG: CPT | Performed by: PHYSICIAN ASSISTANT

## 2025-05-16 ASSESSMENT — FIBROSIS 4 INDEX: FIB4 SCORE: 1.84

## 2025-05-16 NOTE — PROGRESS NOTES
Subjective     Stephanie Fitzpatrick is a 69 y.o. female who presents with Fall (Walking on wood jamil in socks slipped landed hard on buttock x 3 days ago)    HPI:  Lidia Fitzpatrick is a 69 y.o. female who presents today for evaluation after a fall.  She is here today with her  who provides the history as she has dementia.   states that she was walking in the house on Monday. She was wearing a pair slippers does not have good  on them and her foot slipped out from under her causing her to fall hard on her low back/buttocks.  She did not hit her head.   states that they have not noticed any significant bruising to the area but she is walking a bit more hesitantly this past week and he wanted to have her evaluated for any fractures.        ROS      PMH:  has a past medical history of Anxiety, CATARACT, Chronic thyroiditis, Depression, Dyslipidemia, Gynecological disorder, Hypothyroidism, Keratoconjunctivitis sicca, multiple thyroid nodules, Osteopenia (2005), S/P hysterectomy with oophorectomy (2016), Urinary incontinence (11/27/2017), and Urinary tract infection, site not specified.    She has no past medical history of Addisons disease (HCC), Adrenal disorder, Allergy, Anemia, Arthritis, Blood transfusion, Clotting disorder, COPD, Cushings syndrome, GERD (gastroesophageal reflux disease), Goiter, Headache(784.0), HIV (human immunodeficiency virus infection), IBD (inflammatory bowel disease), Meningitis, Migraine, Muscle disorder, Parathyroid disorder (HCC), Pituitary disease ([facility]), Substance abuse ([facility]), or Ulcer.  MEDS: Current Medications[1]  ALLERGIES: Allergies[2]  SURGHX: Past Surgical History[3]  SOCHX:  reports that she has never smoked. She has never used smokeless tobacco. She reports that she does not drink alcohol and does not use drugs.  FH: Family history was reviewed, no pertinent findings to report      Objective     /62   Pulse 72   Temp 36.4 °C (97.5 °F) (Temporal)    "Resp 18   Ht 1.575 m (5' 2\")   Wt 52.6 kg (116 lb)   SpO2 95%   BMI 21.22 kg/m²      Physical Exam  Constitutional:       General: She is not in acute distress.     Appearance: She is not diaphoretic.   HENT:      Head: Normocephalic and atraumatic.      Right Ear: External ear normal.      Left Ear: External ear normal.   Eyes:      Conjunctiva/sclera: Conjunctivae normal.      Pupils: Pupils are equal, round, and reactive to light.   Pulmonary:      Effort: Pulmonary effort is normal. No respiratory distress.   Musculoskeletal:      Cervical back: Normal range of motion.      Comments: Exam is a bit limited secondary to patient's dementia and inability to follow directions.  No obvious swelling, erythema, or ecchymosis on the low back, hips, or buttocks.  No deformity noted.    Skin:     Findings: No rash.   Neurological:      Mental Status: She is alert and oriented to person, place, and time.   Psychiatric:         Mood and Affect: Mood and affect normal.         Cognition and Memory: Memory normal.         Judgment: Judgment normal.           Assessment & Plan     1. Fall, initial encounter (Primary)  - DX-LUMBAR SPINE-2 OR 3 VIEWS; Future  - DX-HIP-BILATERAL-WITH PELVIS-3/4 VIEWS; Future    2. Acute low back pain, unspecified back pain laterality, unspecified whether sciatica present   - DX-LUMBAR SPINE-2 OR 3 VIEWS; Future    3. Hip pain, unspecified laterality  - DX-HIP-BILATERAL-WITH PELVIS-3/4 VIEWS; Future      x-rays of the lumbar spine and bilateral hip/pelvis do not reveal any obvious fractures or dislocations.  Likely she is just sore from the fall.  Recommend that he continue to monitor her symptoms and if she is still showing signs of discomfort next week they should try to follow-up with her primary care provider.        Differential Diagnosis, natural history, and supportive care discussed. Return to the Urgent Care or follow up with your PCP if symptoms fail to resolve, or for any new or " "worsening symptoms. Emergency room precautions discussed. Patient and/or family appears understanding of information.         [1]   Current Outpatient Medications:     levothyroxine (SYNTHROID) 100 MCG Tab, Take 1 Tablet by mouth every morning on an empty stomach., Disp: 100 Tablet, Rfl: 2    QUEtiapine (SEROQUEL) 25 MG Tab, Take 0.5-1 Tablets by mouth at bedtime as needed (agitaion, sun downing)., Disp: 90 Tablet, Rfl: 0    cyanocobalamin (VITAMIN B-12) 100 MCG Tab, Take 100 mcg by mouth every day., Disp: , Rfl:     Nutritional Supplements (NUTRITIONAL SUPPLEMENT PO), Take  by mouth. Better bladder, Disp: , Rfl:     NON SPECIFIED, every day. Neuro Q brain health & Youthful brain, Disp: , Rfl:     Cholecalciferol (VITAMIN D3 PO), Take 50 mcg by mouth 3 times a week., Disp: , Rfl:     multivitamin (THERAGRAN) Tab, Take 1 Tab by mouth every day., Disp: , Rfl:     Calcium Citrate-Vitamin D (CALCIUM + D PO), Take 1 Tablet by mouth every day. 600mg/12.5 mcg, Disp: , Rfl:   [2]   Allergies  Allergen Reactions    Morphine Unspecified     Hallucinations  RXN=24 years ago; patient reports \"sensitive to Morphine\" not a true allergy   [3]   Past Surgical History:  Procedure Laterality Date    CARPAL TUNNEL ENDOSCOPIC Right 11/29/2017    Procedure: CARPAL TUNNEL ENDOSCOPIC;  Surgeon: Raheem Ennis M.D.;  Location: [facility];  Service: Orthopedics    HYSTERECTOMY ROBOTIC  11/21/2016    Procedure: HYSTERECTOMY ROBOTIC SUPRA CERVICAL WITH BILATERAL SALPINGO-OOPHORECTOMY;  Surgeon: Columba Estrada M.D.;  Location: [facility];  Service:     COLPOSACROPEXY ROBOTIC  11/21/2016    Procedure: COLPOSACROPEXY ROBOTIC;  Surgeon: Columba Estrada M.D.;  Location: [facility];  Service:     CYSTOSCOPY  11/21/2016    Procedure: CYSTOSCOPY;  Surgeon: Columba Estrada M.D.;  Location: [facility];  Service:     VAGINAL PERINEAL EXAM REPAIR  11/21/2016    Procedure: VAGINAL " PERINEAL EXAM REPAIR FOR PERINEORRHAPHY, LEVATORPLASTY;  Surgeon: Columba Estrada M.D.;  Location: SUR[facility]ERY [facility];  Service:     ABDOMINAL HYSTERECTOMY TOTAL      BLADDER SUSPENSION      TUBAL COAGULATION LAPAROSCOPIC BILATERAL

## (undated) DEVICE — PAD PREP 24 X 48 CUFFED - (100/CA)

## (undated) DEVICE — CANISTER SUCTION 3000ML MECHANICAL FILTER AUTO SHUTOFF MEDI-VAC NONSTERILE LF DISP  (40EA/CA)

## (undated) DEVICE — PACK LOWER EXTREMITY - (2/CA)

## (undated) DEVICE — LACTATED RINGERS INJ 1000 ML - (14EA/CA 60CA/PF)

## (undated) DEVICE — AGEE CARPAL TUNNEL RELEASE (6EA/PK)

## (undated) DEVICE — SUCTION INSTRUMENT YANKAUER BULBOUS TIP W/O VENT (50EA/CA)

## (undated) DEVICE — BANDAGE STERILE 3 IN X 75 IN (12EA/BX 8BX/CA)

## (undated) DEVICE — MASK ANESTHESIA ADULT  - (100/CA)

## (undated) DEVICE — WATER IRRIG. STER. 1500 ML - (9/CA)

## (undated) DEVICE — ELECTRODE 850 FOAM ADHESIVE - HYDROGEL RADIOTRNSPRNT (50/PK)

## (undated) DEVICE — KIT ROOM DECONTAMINATION

## (undated) DEVICE — DETERGENT RENUZYME PLUS 10 OZ PACKET (50/BX)

## (undated) DEVICE — SET LEADWIRE 5 LEAD BEDSIDE DISPOSABLE ECG (1SET OF 5/EA)

## (undated) DEVICE — SENSOR SPO2 NEO LNCS ADHESIVE (20/BX) SEE USER NOTES

## (undated) DEVICE — SET EXTENSION WITH 2 PORTS (48EA/CA) ***PART #2C8610 IS A SUBSTITUTE*****

## (undated) DEVICE — TUBING CLEARLINK DUO-VENT - C-FLO (48EA/CA)

## (undated) DEVICE — SUTURE GENERAL

## (undated) DEVICE — GOWN WARMING STANDARD FLEX - (30/CA)

## (undated) DEVICE — DRAPE STRLE REG TOWEL 18X24 - (10/BX 4BX/CA)"

## (undated) DEVICE — ELECTRODE DUAL RETURN W/ CORD - (50/PK)

## (undated) DEVICE — HEAD HOLDER JUNIOR/ADULT

## (undated) DEVICE — PROTECTOR ULNA NERVE - (36PR/CA)

## (undated) DEVICE — GLOVE BIOGEL SZ 8 SURGICAL PF LTX - (50PR/BX 4BX/CA)

## (undated) DEVICE — NEPTUNE 4 PORT MANIFOLD - (20/PK)

## (undated) DEVICE — WATER IRRIGATION STERILE 1000ML (12EA/CA)

## (undated) DEVICE — SUTURE 4-0 ETHILON FS-2 18 (36PK/BX)"

## (undated) DEVICE — GLOVE BIOGEL ECLIPSE PF LATEX SIZE 7.5

## (undated) DEVICE — KIT CARPAL TUNNEL ENDOSCOPIC

## (undated) DEVICE — SODIUM CHL IRRIGATION 0.9% 1000ML (12EA/CA)

## (undated) DEVICE — BANDAGE STERILE 2 IN X 75 IN (12EA/BX 8BX/CA)

## (undated) DEVICE — KIT ANESTHESIA W/CIRCUIT & 3/LT BAG W/FILTER (20EA/CA)

## (undated) DEVICE — CHLORAPREP 26 ML APPLICATOR - ORANGE TINT(25/CA)

## (undated) DEVICE — ANTI-FOG SOLUTION - 60BTL/CA